# Patient Record
Sex: MALE | Race: BLACK OR AFRICAN AMERICAN | NOT HISPANIC OR LATINO | Employment: UNEMPLOYED | URBAN - METROPOLITAN AREA
[De-identification: names, ages, dates, MRNs, and addresses within clinical notes are randomized per-mention and may not be internally consistent; named-entity substitution may affect disease eponyms.]

---

## 2021-04-16 ENCOUNTER — HOSPITAL ENCOUNTER (OUTPATIENT)
Facility: HOSPITAL | Age: 56
Setting detail: OBSERVATION
Discharge: HOME | End: 2021-04-17
Attending: EMERGENCY MEDICINE | Admitting: INTERNAL MEDICINE
Payer: COMMERCIAL

## 2021-04-16 ENCOUNTER — APPOINTMENT (EMERGENCY)
Dept: RADIOLOGY | Facility: HOSPITAL | Age: 56
End: 2021-04-16
Payer: COMMERCIAL

## 2021-04-16 DIAGNOSIS — R07.9 CHEST PAIN, UNSPECIFIED TYPE: ICD-10-CM

## 2021-04-16 DIAGNOSIS — R07.9 CHEST PAIN: Primary | ICD-10-CM

## 2021-04-16 DIAGNOSIS — I10 HYPERTENSION: ICD-10-CM

## 2021-04-16 DIAGNOSIS — K21.9 GASTROESOPHAGEAL REFLUX DISEASE, UNSPECIFIED WHETHER ESOPHAGITIS PRESENT: ICD-10-CM

## 2021-04-16 PROBLEM — R03.0 ELEVATED BLOOD PRESSURE READING: Status: ACTIVE | Noted: 2021-04-16

## 2021-04-16 LAB
ALBUMIN SERPL BCP-MCNC: 4.4 G/DL (ref 3.4–4.8)
ALP SERPL-CCNC: 61.8 U/L (ref 10–129)
ALT SERPL W P-5'-P-CCNC: 23 U/L (ref 5–63)
ANION GAP SERPL CALCULATED.3IONS-SCNC: 5 MMOL/L (ref 4–13)
APTT PPP: 30 SECONDS (ref 23–31)
AST SERPL W P-5'-P-CCNC: 21 U/L (ref 15–41)
BASOPHILS # BLD AUTO: 0.02 THOUSANDS/ΜL (ref 0–0.1)
BASOPHILS NFR BLD AUTO: 0 % (ref 0–1)
BILIRUB SERPL-MCNC: 0.28 MG/DL (ref 0.3–1.2)
BUN SERPL-MCNC: 23 MG/DL (ref 6–20)
CALCIUM SERPL-MCNC: 9.3 MG/DL (ref 8.4–10.2)
CHLORIDE SERPL-SCNC: 103 MMOL/L (ref 96–108)
CHOLEST SERPL-MCNC: 202 MG/DL
CO2 SERPL-SCNC: 31 MMOL/L (ref 22–33)
CREAT SERPL-MCNC: 1.02 MG/DL (ref 0.5–1.2)
EOSINOPHIL # BLD AUTO: 0.17 THOUSAND/ΜL (ref 0–0.61)
EOSINOPHIL NFR BLD AUTO: 3 % (ref 0–6)
ERYTHROCYTE [DISTWIDTH] IN BLOOD BY AUTOMATED COUNT: 12.5 % (ref 11.6–15.1)
GFR SERPL CREATININE-BSD FRML MDRD: 95 ML/MIN/1.73SQ M
GLUCOSE SERPL-MCNC: 112 MG/DL (ref 65–140)
HCT VFR BLD AUTO: 40.4 % (ref 36.5–49.3)
HDLC SERPL-MCNC: 38 MG/DL
HGB BLD-MCNC: 13.3 G/DL (ref 12–17)
IMM GRANULOCYTES # BLD AUTO: 0.01 THOUSAND/UL (ref 0–0.2)
IMM GRANULOCYTES NFR BLD AUTO: 0 % (ref 0–2)
INR PPP: 0.94 (ref 0.9–1.1)
LDLC SERPL CALC-MCNC: 132 MG/DL (ref 0–100)
LYMPHOCYTES # BLD AUTO: 2.45 THOUSANDS/ΜL (ref 0.6–4.47)
LYMPHOCYTES NFR BLD AUTO: 36 % (ref 14–44)
MCH RBC QN AUTO: 27.3 PG (ref 26.8–34.3)
MCHC RBC AUTO-ENTMCNC: 32.9 G/DL (ref 31.4–37.4)
MCV RBC AUTO: 83 FL (ref 82–98)
MONOCYTES # BLD AUTO: 0.59 THOUSAND/ΜL (ref 0.17–1.22)
MONOCYTES NFR BLD AUTO: 9 % (ref 4–12)
NEUTROPHILS # BLD AUTO: 3.64 THOUSANDS/ΜL (ref 1.85–7.62)
NEUTS SEG NFR BLD AUTO: 52 % (ref 43–75)
PLATELET # BLD AUTO: 178 THOUSANDS/UL (ref 149–390)
PMV BLD AUTO: 9.7 FL (ref 8.9–12.7)
POTASSIUM SERPL-SCNC: 3.8 MMOL/L (ref 3.5–5)
PROT SERPL-MCNC: 7.2 G/DL (ref 6.4–8.3)
PROTHROMBIN TIME: 10.7 SECONDS (ref 9.5–12.1)
RBC # BLD AUTO: 4.87 MILLION/UL (ref 3.88–5.62)
SODIUM SERPL-SCNC: 139 MMOL/L (ref 133–145)
TRIGL SERPL-MCNC: 160.6 MG/DL
TROPONIN I SERPL-MCNC: <0.03 NG/ML (ref 0–0.07)
WBC # BLD AUTO: 6.88 THOUSAND/UL (ref 4.31–10.16)

## 2021-04-16 PROCEDURE — 99220 PR INITIAL OBSERVATION CARE/DAY 70 MINUTES: CPT | Performed by: INTERNAL MEDICINE

## 2021-04-16 PROCEDURE — 85025 COMPLETE CBC W/AUTO DIFF WBC: CPT | Performed by: EMERGENCY MEDICINE

## 2021-04-16 PROCEDURE — 85610 PROTHROMBIN TIME: CPT | Performed by: EMERGENCY MEDICINE

## 2021-04-16 PROCEDURE — 83036 HEMOGLOBIN GLYCOSYLATED A1C: CPT | Performed by: INTERNAL MEDICINE

## 2021-04-16 PROCEDURE — 80053 COMPREHEN METABOLIC PANEL: CPT | Performed by: EMERGENCY MEDICINE

## 2021-04-16 PROCEDURE — 84484 ASSAY OF TROPONIN QUANT: CPT | Performed by: INTERNAL MEDICINE

## 2021-04-16 PROCEDURE — 85730 THROMBOPLASTIN TIME PARTIAL: CPT | Performed by: EMERGENCY MEDICINE

## 2021-04-16 PROCEDURE — 93005 ELECTROCARDIOGRAM TRACING: CPT

## 2021-04-16 PROCEDURE — 99285 EMERGENCY DEPT VISIT HI MDM: CPT | Performed by: EMERGENCY MEDICINE

## 2021-04-16 PROCEDURE — 99285 EMERGENCY DEPT VISIT HI MDM: CPT

## 2021-04-16 PROCEDURE — 80061 LIPID PANEL: CPT | Performed by: INTERNAL MEDICINE

## 2021-04-16 PROCEDURE — 84484 ASSAY OF TROPONIN QUANT: CPT | Performed by: EMERGENCY MEDICINE

## 2021-04-16 PROCEDURE — 71045 X-RAY EXAM CHEST 1 VIEW: CPT

## 2021-04-16 PROCEDURE — 36415 COLL VENOUS BLD VENIPUNCTURE: CPT | Performed by: EMERGENCY MEDICINE

## 2021-04-16 RX ORDER — LOSARTAN POTASSIUM 25 MG/1
25 TABLET ORAL DAILY
Status: DISCONTINUED | OUTPATIENT
Start: 2021-04-16 | End: 2021-04-17

## 2021-04-16 RX ORDER — ASPIRIN 81 MG/1
81 TABLET ORAL ONCE
Status: COMPLETED | OUTPATIENT
Start: 2021-04-16 | End: 2021-04-16

## 2021-04-16 RX ORDER — PANTOPRAZOLE SODIUM 40 MG/1
40 TABLET, DELAYED RELEASE ORAL
Status: DISCONTINUED | OUTPATIENT
Start: 2021-04-16 | End: 2021-04-17 | Stop reason: HOSPADM

## 2021-04-16 RX ORDER — ASPIRIN 81 MG/1
81 TABLET, CHEWABLE ORAL AS NEEDED
COMMUNITY
End: 2021-07-19 | Stop reason: ALTCHOICE

## 2021-04-16 RX ORDER — ASPIRIN 81 MG/1
81 TABLET, CHEWABLE ORAL DAILY
Status: DISCONTINUED | OUTPATIENT
Start: 2021-04-17 | End: 2021-04-17 | Stop reason: HOSPADM

## 2021-04-16 RX ORDER — HYDRALAZINE HYDROCHLORIDE 20 MG/ML
5 INJECTION INTRAMUSCULAR; INTRAVENOUS EVERY 6 HOURS SCHEDULED
Status: DISCONTINUED | OUTPATIENT
Start: 2021-04-16 | End: 2021-04-16

## 2021-04-16 RX ORDER — HYDRALAZINE HYDROCHLORIDE 20 MG/ML
5 INJECTION INTRAMUSCULAR; INTRAVENOUS EVERY 6 HOURS PRN
Status: DISCONTINUED | OUTPATIENT
Start: 2021-04-16 | End: 2021-04-16

## 2021-04-16 RX ORDER — ONDANSETRON 2 MG/ML
4 INJECTION INTRAMUSCULAR; INTRAVENOUS ONCE
Status: DISCONTINUED | OUTPATIENT
Start: 2021-04-16 | End: 2021-04-17 | Stop reason: HOSPADM

## 2021-04-16 RX ADMIN — HYDRALAZINE HYDROCHLORIDE 5 MG: 20 INJECTION INTRAMUSCULAR; INTRAVENOUS at 22:56

## 2021-04-16 RX ADMIN — ASPIRIN 81 MG: 81 TABLET, COATED ORAL at 23:00

## 2021-04-16 RX ADMIN — PANTOPRAZOLE SODIUM 40 MG: 40 TABLET, DELAYED RELEASE ORAL at 23:00

## 2021-04-16 NOTE — ED PROVIDER NOTES
History  Chief Complaint   Patient presents with    Chest Pain     Pt reports on and off left midsternal CP for one week  Pt denies other symptoms  Pain does not worsen with movement  Patient is a 48-year-old male with no documented medical history who presents for chest pain  Patient says he has been having substernal chest discomfort off and on over the past week  He describes it as a pinching like pain    He says it does not radiate anywhere else  He says that is not exertional and will happen randomly, usually at night  He denies associated lightheadedness, dizziness, nausea, vomiting or shortness of breath  He denies any chest pain at this time  The patient is hypertensive here in department  He is not take anything for blood pressure as an outpatient  Rest of his vitals are within normal limits          Prior to Admission Medications   Prescriptions Last Dose Informant Patient Reported? Taking?   aspirin 81 mg chewable tablet  Self Yes Yes   Sig: Chew 81 mg as needed for mild pain      Facility-Administered Medications: None       History reviewed  No pertinent past medical history  History reviewed  No pertinent surgical history  History reviewed  No pertinent family history  I have reviewed and agree with the history as documented  E-Cigarette/Vaping    E-Cigarette Use Never User      E-Cigarette/Vaping Substances     Social History     Tobacco Use    Smoking status: Never Smoker    Smokeless tobacco: Never Used   Substance Use Topics    Alcohol use: Yes     Comment: socially    Drug use: Never       Review of Systems   Constitutional: Negative for chills, fever and unexpected weight change  HENT: Negative for congestion, sore throat and trouble swallowing  Eyes: Negative for pain, discharge and itching  Respiratory: Negative for cough, chest tightness, shortness of breath and wheezing  Cardiovascular: Positive for chest pain   Negative for palpitations and leg swelling  Gastrointestinal: Negative for abdominal pain, blood in stool, diarrhea, nausea and vomiting  Endocrine: Negative for polyuria  Genitourinary: Negative for difficulty urinating, dysuria, frequency and hematuria  Musculoskeletal: Negative for arthralgias and back pain  Skin: Negative for color change and rash  Neurological: Negative for dizziness, syncope, weakness, light-headedness and headaches  Physical Exam  Physical Exam  Vitals signs and nursing note reviewed  Constitutional:       General: He is not in acute distress  Appearance: He is well-developed  HENT:      Head: Normocephalic and atraumatic  Right Ear: External ear normal       Left Ear: External ear normal    Eyes:      Conjunctiva/sclera: Conjunctivae normal       Pupils: Pupils are equal, round, and reactive to light  Neck:      Musculoskeletal: Normal range of motion  Cardiovascular:      Rate and Rhythm: Normal rate and regular rhythm  Heart sounds: Normal heart sounds  No murmur  No friction rub  No gallop  Pulmonary:      Effort: Pulmonary effort is normal  No respiratory distress  Breath sounds: Normal breath sounds  No wheezing or rales  Abdominal:      General: Bowel sounds are normal  There is no distension  Palpations: Abdomen is soft  Tenderness: There is no abdominal tenderness  There is no guarding  Musculoskeletal: Normal range of motion  General: No tenderness or deformity  Lymphadenopathy:      Cervical: No cervical adenopathy  Skin:     General: Skin is warm and dry  Neurological:      General: No focal deficit present  Mental Status: He is alert and oriented to person, place, and time  Mental status is at baseline  Cranial Nerves: No cranial nerve deficit  Sensory: No sensory deficit  Motor: No weakness or abnormal muscle tone     Psychiatric:         Behavior: Behavior normal          Vital Signs  ED Triage Vitals [04/16/21 1929] Temperature Pulse Respirations Blood Pressure SpO2   98 3 °F (36 8 °C) 94 18 (!) 200/112 100 %      Temp Source Heart Rate Source Patient Position - Orthostatic VS BP Location FiO2 (%)   Oral Monitor Sitting Right arm --      Pain Score       5           Vitals:    04/16/21 1929 04/16/21 2033   BP: (!) 200/112 162/91   Pulse: 94 85   Patient Position - Orthostatic VS: Sitting Lying         Visual Acuity      ED Medications  Medications - No data to display    Diagnostic Studies  Results Reviewed     Procedure Component Value Units Date/Time    Troponin I [[id number]]  (Normal) Collected: 04/16/21 1935    Lab Status: Final result Specimen: Blood from Arm, Left Updated: 04/16/21 2001     Troponin I <0 03 ng/mL     Comprehensive metabolic panel [[id number]]  (Abnormal) Collected: 04/16/21 1935    Lab Status: Final result Specimen: Blood from Arm, Left Updated: 04/16/21 1959     Sodium 139 mmol/L      Potassium 3 8 mmol/L      Chloride 103 mmol/L      CO2 31 mmol/L      ANION GAP 5 mmol/L      BUN 23 mg/dL      Creatinine 1 02 mg/dL      Glucose 112 mg/dL      Calcium 9 3 mg/dL      AST 21 U/L      ALT 23 U/L      Alkaline Phosphatase 61 8 U/L      Total Protein 7 2 g/dL      Albumin 4 4 g/dL      Total Bilirubin 0 28 mg/dL      eGFR 95 ml/min/1 73sq m     Narrative:      [facility] guidelines for Chronic Kidney Disease (CKD):     Stage 1 with normal or high GFR (GFR > 90 mL/min/1 73 square meters)    Stage 2 Mild CKD (GFR = 60-89 mL/min/1 73 square meters)    Stage 3A Moderate CKD (GFR = 45-59 mL/min/1 73 square meters)    Stage 3B Moderate CKD (GFR = 30-44 mL/min/1 73 square meters)    Stage 4 Severe CKD (GFR = 15-29 mL/min/1 73 square meters)    Stage 5 End Stage CKD (GFR <15 mL/min/1 73 square meters)  Note: GFR calculation is accurate only with a steady state creatinine    APTT [[id number]]  (Normal) Collected: 04/16/21 1935    Lab Status: Final result Specimen: Blood from Arm, Left Updated: 04/16/21 1958     PTT 30 seconds     Protime-INR [[id number]]  (Normal) Collected: 04/16/21 1935    Lab Status: Final result Specimen: Blood from Arm, Left Updated: 04/16/21 1958     Protime 10 7 seconds      INR 0 94    Narrative:      INR Reference Ranges:  No Anticoagulant, Normal:           0 9-1 1  Standard Dose, Oral Anticoagulant:  2 0-3 0  High Dose, Oral Anticoagulant:      2 5-3 5    CBC and differential [[id number]]  (Normal) Collected: 04/16/21 1935    Lab Status: Final result Specimen: Blood from Arm, Left Updated: 04/16/21 1941     WBC 6 88 Thousand/uL      RBC 4 87 Million/uL      Hemoglobin 13 3 g/dL      Hematocrit 40 4 %      MCV 83 fL      MCH 27 3 pg      MCHC 32 9 g/dL      RDW 12 5 %      MPV 9 7 fL      Platelets 848 Thousands/uL      Neutrophils Relative 52 %      Immat GRANS % 0 %      Lymphocytes Relative 36 %      Monocytes Relative 9 %      Eosinophils Relative 3 %      Basophils Relative 0 %      Neutrophils Absolute 3 64 Thousands/µL      Immature Grans Absolute 0 01 Thousand/uL      Lymphocytes Absolute 2 45 Thousands/µL      Monocytes Absolute 0 59 Thousand/µL      Eosinophils Absolute 0 17 Thousand/µL      Basophils Absolute 0 02 Thousands/µL                  XR chest 1 view portable    (Results Pending)              Procedures  ECG 12 Lead Documentation Only    Date/Time: 4/16/2021 7:53 PM  Performed by: Malcom Metzger DO  Authorized by: Malcom Metzger DO     Indications / Diagnosis:  Chest pain   ECG reviewed by me, the ED Provider: yes    Patient location:  ED  Previous ECG:     Previous ECG:  Unavailable    Comparison to cardiac monitor: Yes    Interpretation:     Interpretation: abnormal    Rate:     ECG rate:  84    ECG rate assessment: normal    Rhythm:     Rhythm: sinus rhythm    Ectopy:     Ectopy: none    QRS:     QRS axis:  Normal  Conduction:     Conduction: normal    ST segments:     ST segments:  Abnormal    Depression:  V2 and V3             ED Course  ED Course as of Apr 16 2114 Fri Apr 16, 2021 1954 Spoke with Dr Julia Norris of Cards who looked at EKG  Agrees that patient is not having STEMI at this time given lack of chest pain  He recommended trending troponins at this time                HEART Risk Score      Most Recent Value   Heart Score Risk Calculator   History  1 Filed at: 04/16/2021 2004   ECG  1 Filed at: 04/16/2021 2004   Age  1 Filed at: 04/16/2021 2004   Risk Factors  1 Filed at: 04/16/2021 2004   Troponin  0 Filed at: 04/16/2021 2004   HEART Score  4 Filed at: 04/16/2021 2004                                    MDM  Number of Diagnoses or Management Options  Chest pain:   Diagnosis management comments: 79-year-old male presenting for substernal chest discomfort off and on for the past week  No associated symptoms  Describes it as a pinching like sensation  Patient initially hypertensive on arrival   Rest of vitals within normal limits  Denies chest pain at this time  Will obtain cardiac workup  EKG shows some mild ST elevation in V2 V3  Spoke with cardiology who believes it is likely secondary to benign early report  Labs within normal limits  Cardiology agreed with observation overnight  Patient admitted to Medicine  Disposition  Final diagnoses:   Chest pain     Time reflects when diagnosis was documented in both MDM as applicable and the Disposition within this note     Time User Action Codes Description Comment    4/16/2021  8:49 PM Ivette Trejo Add [R07 9] Chest pain       ED Disposition     ED Disposition Condition Date/Time Comment    Admit Stable Fri Apr 16, 2021  8:49 PM Case was discussed with GORGE and the patient's admission status was agreed to be Admission Status: observation status to the service of Dr Fina Bermudez   Follow-up Information    None         Patient's Medications   Discharge Prescriptions    No medications on file     No discharge procedures on file      PDMP Review     None          ED Provider  Electronically Signed by           Escobar Leggett DO  04/16/21 9870

## 2021-04-17 VITALS
TEMPERATURE: 97.9 F | HEART RATE: 78 BPM | DIASTOLIC BLOOD PRESSURE: 85 MMHG | WEIGHT: 188.27 LBS | BODY MASS INDEX: 29.55 KG/M2 | SYSTOLIC BLOOD PRESSURE: 143 MMHG | OXYGEN SATURATION: 99 % | HEIGHT: 67 IN | RESPIRATION RATE: 18 BRPM

## 2021-04-17 PROBLEM — I10 HYPERTENSION: Status: ACTIVE | Noted: 2021-04-17

## 2021-04-17 LAB
ATRIAL RATE: 85 BPM
EST. AVERAGE GLUCOSE BLD GHB EST-MCNC: 140 MG/DL
HBA1C MFR BLD: 6.5 %
HCV AB SER QL: NORMAL
P AXIS: 47 DEGREES
PR INTERVAL: 175 MS
QRS AXIS: 32 DEGREES
QRSD INTERVAL: 78 MS
QT INTERVAL: 286 MS
QTC INTERVAL: 338 MS
T WAVE AXIS: 3 DEGREES
TROPONIN I SERPL-MCNC: <0.03 NG/ML (ref 0–0.07)
TROPONIN I SERPL-MCNC: <0.03 NG/ML (ref 0–0.07)
VENTRICULAR RATE: 84 BPM

## 2021-04-17 PROCEDURE — 86803 HEPATITIS C AB TEST: CPT | Performed by: INTERNAL MEDICINE

## 2021-04-17 PROCEDURE — 84484 ASSAY OF TROPONIN QUANT: CPT | Performed by: INTERNAL MEDICINE

## 2021-04-17 PROCEDURE — 93010 ELECTROCARDIOGRAM REPORT: CPT | Performed by: INTERNAL MEDICINE

## 2021-04-17 RX ORDER — ACETAMINOPHEN 325 MG/1
650 TABLET ORAL EVERY 6 HOURS PRN
Status: DISCONTINUED | OUTPATIENT
Start: 2021-04-17 | End: 2021-04-17 | Stop reason: HOSPADM

## 2021-04-17 RX ORDER — LOSARTAN POTASSIUM 50 MG/1
50 TABLET ORAL DAILY
Qty: 30 TABLET | Refills: 0 | Status: SHIPPED | OUTPATIENT
Start: 2021-04-18 | End: 2021-05-27 | Stop reason: SDUPTHER

## 2021-04-17 RX ORDER — ATORVASTATIN CALCIUM 40 MG/1
40 TABLET, FILM COATED ORAL
Qty: 30 TABLET | Refills: 0 | Status: SHIPPED | OUTPATIENT
Start: 2021-04-17 | End: 2021-05-27 | Stop reason: SDUPTHER

## 2021-04-17 RX ORDER — PANTOPRAZOLE SODIUM 40 MG/1
40 TABLET, DELAYED RELEASE ORAL
Qty: 30 TABLET | Refills: 0 | Status: SHIPPED | OUTPATIENT
Start: 2021-04-18 | End: 2021-09-27

## 2021-04-17 RX ORDER — LOSARTAN POTASSIUM 50 MG/1
50 TABLET ORAL DAILY
Status: DISCONTINUED | OUTPATIENT
Start: 2021-04-18 | End: 2021-04-17 | Stop reason: HOSPADM

## 2021-04-17 RX ORDER — ATORVASTATIN CALCIUM 40 MG/1
40 TABLET, FILM COATED ORAL
Status: DISCONTINUED | OUTPATIENT
Start: 2021-04-17 | End: 2021-04-17 | Stop reason: HOSPADM

## 2021-04-17 RX ADMIN — PANTOPRAZOLE SODIUM 40 MG: 40 TABLET, DELAYED RELEASE ORAL at 08:47

## 2021-04-17 RX ADMIN — ACETAMINOPHEN 650 MG: 325 TABLET, FILM COATED ORAL at 04:56

## 2021-04-17 RX ADMIN — ASPIRIN 81 MG: 81 TABLET, CHEWABLE ORAL at 08:47

## 2021-04-17 RX ADMIN — ACETAMINOPHEN 650 MG: 325 TABLET, FILM COATED ORAL at 12:36

## 2021-04-17 RX ADMIN — LOSARTAN POTASSIUM 25 MG: 25 TABLET, FILM COATED ORAL at 04:24

## 2021-04-17 RX ADMIN — ENOXAPARIN SODIUM 40 MG: 40 INJECTION SUBCUTANEOUS at 08:47

## 2021-04-17 NOTE — ASSESSMENT & PLAN NOTE
Chest pain for 8-10 days  Sharp in character, substernal, 6/10, nonradiating, comes and goes, no associated diaphoresis, nausea vomiting, shortness of breath  Not associated with exertion  Patient has history of high blood pressure on occasions, he is not taking any antihypertensive, he does not follow cardiologist or primary care doctor  EKG showed normal sinus rhythm LV hypertrophy  MICHELLE score 1   Troponin 3x negative  Lipid panel showed cholesterol 202, triglyceride 160, HDL 38,   Plan:  Check hemoglobin A1c   Follow results  Follow-up with cardiology outpatient for nuclear stress test and echocardiogram  Continue aspirin 81 mg and atorvastatin 40 mg daily  Continue pantoprazole p o  40 mg daily as chest pain could be related to his GERD as it usually worsens with meals  Patient was instructed to follow cardiac diet  Please schedule stress test within the next 2-3 days  Patient was previously counseled follow-up cardiologist and primary care physician within 2-3 days

## 2021-04-17 NOTE — H&P
Mild[address]conor 30 1965, 63 y o  male MRN: [id number]  Unit/Bed#: -01 Encounter: [id number]  Primary Care Provider: No primary care provider on file  Date and time admitted to hospital: 4/16/2021  7:25 PM    * Chest pain-rule out ACS  Assessment & Plan  70-year-old male, nonsmoker with no significant past medical history presented to emergency department complaining of chest pain  Chest pain for 8-10 days  Sharp in character, substernal, 6/10, nonradiating, comes and goes, no associated diaphoresis, nausea vomiting, shortness of breath  Not associated with exertion  Patient has history of high blood pressure on occasions, he is not taking any antihypertensive, he does not follow cardiologist or primary care doctor  EKG showed normal sinus rhythm , normal axis, normal [place] and QTC, borderline ST depression V2 and V3  MICHELLE score 1   First set of Troponin negative  Trend troponin  Check hemoglobin A1c, lipid panel  Continue aspirin daily  Consult cardiology  Monitor on tele    GERD (gastroesophageal reflux disease)  Assessment & Plan  Patient has symptoms of heartburn, he drinks alcohol on regular basis but has not touched alcohol since 10 days because of symptoms of heartburn  Patient takes baby aspirin daily as he heard from people that aspirin is help full in preventing heart disease and stroke  Patient mentioned that he eats a lot of spicy food and tomatoes  For the symptoms of heartburn ,I will start patient on pantoprazole and see if it can help  Elevated blood pressure reading  Assessment & Plan  In emergency department patient had elevated blood pressure to 200/112, later improved to 162/91  Continue to monitor blood pressure  If blood pressure remains consistently elevated, consider starting antihypertensives      VTE Prophylaxis: Enoxaparin (Lovenox)  / sequential compression device   Code Status: FC    Anticipated Length of Stay:  Patient will be admitted on an Observation basis with an anticipated length of stay of  1 midnights  Justification for Hospital Stay:  Chest pain-rule out ACS    Total Time for Visit, including Counseling / Coordination of Care: 1 hour  Greater than 50% of this total time spent on direct patient counseling and coordination of care  Chief Complaint:   Chest pain    History of Present Illness:    Noah Taylor is a 63 y o  male nonsmoker with no significant past medical history presented to emergency department complaining of chest pain  Patient is experiencing chest pain for past 8-10 days  Pain is sharp in character, substernal, 6/10, comes and goes, no associated diaphoresis, nausea or vomiting, not associated with exertion, nonradiating  Patient denied headache, dizziness, vision changes, palpitations, shortness of breath, nausea, vomiting, abdominal pain, diarrhea, urinary complaints  On arrival to emergency department patient is afebrile, blood pressure 200/112, later improved to 162/91  Heart rate 85, breathing on room air and saturating at 97%  EKG show normal sinus rhythm with a rate of 84, , normal axis, normal [place] and QTC, borderlineST depression in V2 and V3  Lab stable, troponin negative  Review of Systems:    Review of Systems    Past Medical and Surgical History:     History reviewed  No pertinent past medical history  History reviewed  No pertinent surgical history  Meds/Allergies:    Prior to Admission medications    Medication Sig Start Date End Date Taking? Authorizing Provider   aspirin 81 mg chewable tablet Chew 81 mg as needed for mild pain   Yes Historical Provider, MD     I have reviewed home medications with patient personally      Allergies: No Known Allergies    Social History:     Marital Status: /Civil Union     Substance Use History:   Social History     Substance and Sexual Activity   Alcohol Use Yes    Comment: socially     Social History     Tobacco Use   Smoking Status Never Smoker   Smokeless Tobacco Never Used     Social History     Substance and Sexual Activity   Drug Use Never       Family History:    non-contributory    Physical Exam:     Vitals:   Blood Pressure: 162/91 (04/16/21 2033)  Pulse: 85 (04/16/21 2033)  Temperature: 98 3 °F (36 8 °C) (04/16/21 1929)  Temp Source: Oral (04/16/21 1929)  Respirations: 18 (04/16/21 2033)  SpO2: 97 % (04/16/21 2033)    Physical Exam  Constitutional:       Appearance: Normal appearance  HENT:      Head: Normocephalic and atraumatic  Nose: Nose normal       Mouth/Throat:      Mouth: Mucous membranes are moist    Neck:      Musculoskeletal: Normal range of motion and neck supple  Cardiovascular:      Rate and Rhythm: Normal rate and regular rhythm  Pulmonary:      Effort: Pulmonary effort is normal       Breath sounds: Normal breath sounds  Abdominal:      General: Bowel sounds are normal       Palpations: Abdomen is soft  Tenderness: There is no abdominal tenderness  Musculoskeletal: Normal range of motion  Right lower leg: No edema  Left lower leg: No edema  Skin:     General: Skin is warm  Capillary Refill: Capillary refill takes less than 2 seconds  Neurological:      General: No focal deficit present  Mental Status: He is alert and oriented to person, place, and time  Mental status is at baseline  Psychiatric:         Mood and Affect: Mood normal          Behavior: Behavior normal            Additional Data:     Lab Results: I have personally reviewed pertinent reports        Results from last 7 days   Lab Units 04/16/21 1935   WBC Thousand/uL 6 88   HEMOGLOBIN g/dL 13 3   HEMATOCRIT % 40 4   PLATELETS Thousands/uL 178   NEUTROS PCT % 52   LYMPHS PCT % 36   MONOS PCT % 9   EOS PCT % 3     Results from last 7 days   Lab Units 04/16/21 1935   SODIUM mmol/L 139   POTASSIUM mmol/L 3 8   CHLORIDE mmol/L 103   CO2 mmol/L 31   BUN mg/dL 23*   CREATININE mg/dL 1 02   ANION GAP mmol/L 5   CALCIUM mg/dL 9  3   ALBUMIN g/dL 4 4   TOTAL BILIRUBIN mg/dL 0 28*   ALK PHOS U/L 61 8   ALT U/L 23   AST U/L 21   GLUCOSE RANDOM mg/dL 112     Results from last 7 days   Lab Units 04/16/21  1935   INR  0 94                   Imaging: I have personally reviewed pertinent reports  XR chest 1 view portable    (Results Pending)       EKG, Pathology, and Other Studies Reviewed on Admission:   · EKG: Normal sinus rhythm with a rate of 84, , normal axis, normal [place] and QTC, borderlineST depression in V2 and V3    Allscripts / Epic Records Reviewed: Yes     ** Please Note: This note has been constructed using a voice recognition system   **

## 2021-04-17 NOTE — UTILIZATION REVIEW
Initial Clinical Review    Admission: Date/Time/Statement:   Admission Orders (From admission, onward)     Ordered        04/16/21 2049  Place in Observation  Once                   Orders Placed This Encounter   Procedures    Place in Observation     Standing Status:   Standing     Number of Occurrences:   1     Order Specific Question:   Level of Care     Answer:   Med Surg [16]     ED Arrival Information     Expected Arrival Acuity Means of Arrival Escorted By Service Admission Type    - 4/16/2021 19:13 Emergent Walk-In Self General Medicine Emergency    Arrival Complaint    Medical Problem        Chief Complaint   Patient presents with    Chest Pain     Pt reports on and off left midsternal CP for one week  Pt denies other symptoms  Pain does not worsen with movement  Initial Presentation: 63year old male, presented to the ED @ [address] from home via walk in  Admitted as Observation due to Chest pain  (What happened-time started)   (Brief-abnormal physical findings & tests)   (Brief-what was done)   Date:   04/16/2021   Reports pain for the past 8-10 days  Intermittent chest pain  MICHELLE 1  Initial trop WNL, tr3end trops  ECG: NSR,  Monitor on telemetry  Continue ASA  Consult Cardio as outpatient          ED Triage Vitals [04/16/21 1929]   Temperature Pulse Respirations Blood Pressure SpO2   98 3 °F (36 8 °C) 94 18 (!) 200/112 100 %      Temp Source Heart Rate Source Patient Position - Orthostatic VS BP Location FiO2 (%)   Oral Monitor Sitting Right arm --      Pain Score       5          Wt Readings from Last 1 Encounters:   04/17/21 85 4 kg (188 lb 4 4 oz)     Additional Vital Signs:   Date/Time  Temp  Pulse  Resp  BP  MAP (mmHg)  SpO2  O2 Device  Patient Position - Orthostatic VS   04/17/21 0845  --  89  18  156/85  --  --  --  Lying   04/17/21 0713  97 9 °F (36 6 °C)  75  20  163/90  115  97 %  None (Room air)  Lying   04/17/21 0300  --  81  --  167/94  --  98 %  None (Room air)  -- 21  --  --  --  164/82   --  --  --  --   BP: pt refusing losartan at 21  --  98  --  142/119Abnormal   98  --  --  --   21  --  110Abnormal   --  169/104Abnormal   --  --  --  --   21  --  85  --  175/102Abnormal   --  --  --  --   21  98 2 °F (36 8 °C)  70  20  190/109Abnormal    --  99 %  --  --   BP: dr Aria Valdez aware at 21  --  85  18  162/91  --  97 %  None (Room air)  Lying     2021 @ 2304  Chest X:  No acute cardiopulmonary disease  2021 @ 1932  EC, Sinus rhythm    Pertinent Labs/Diagnostic Test Results:     Results from last 7 days   Lab Units 21   WBC Thousand/uL 6 88   HEMOGLOBIN g/dL 13 3   HEMATOCRIT % 40 4   PLATELETS Thousands/uL 178   NEUTROS ABS Thousands/µL 3 64     Results from last 7 days   Lab Units 21   SODIUM mmol/L 139   POTASSIUM mmol/L 3 8   CHLORIDE mmol/L 103   CO2 mmol/L 31   ANION GAP mmol/L 5   BUN mg/dL 23*   CREATININE mg/dL 1 02   EGFR ml/min/1 73sq m 95   CALCIUM mg/dL 9 3     Results from last 7 days   Lab Units 21   AST U/L 21   ALT U/L 23   ALK PHOS U/L 61 8   TOTAL PROTEIN g/dL 7 2   ALBUMIN g/dL 4 4   TOTAL BILIRUBIN mg/dL 0 28*     Results from last 7 days   Lab Units 21   GLUCOSE RANDOM mg/dL 112       Results from last 7 days   Lab Units 21  0419 216 21   TROPONIN I ng/mL <0 03 <0 03 <0 03     Results from last 7 days   Lab Units 21   PROTIME seconds 10 7   INR  0 94   PTT seconds 30     ED Treatment:   Medication Administration from 2021 1913 to 2021     None        History reviewed  No pertinent past medical history      Admitting Diagnosis: Chest pain [R07 9]  Age/Sex: 63 y o  male  Admission Orders:  Scheduled Medications:  aspirin, 81 mg, Oral, Daily  enoxaparin, 40 mg, Subcutaneous, Daily  losartan, 25 mg, Oral, Daily  ondansetron, 4 mg, Intravenous, Once  pantoprazole, 40 mg, Oral, Early Morning      Continuous IV Infusions:     PRN Meds:  acetaminophen, 650 mg, Oral, Q6H PRN      Consult PT  Telemetry  [facility]  IP CONSULT TO CARDIOLOGY    Network Utilization Review Department  ATTENTION: Please call with any questions or concerns to [phone] and carefully listen to the prompts so that you are directed to the right person  All voicemails are confidential   Shahida Tolbert all requests for admission clinical reviews, approved or denied determinations and any other requests to dedicated fax number below belonging to the campus where the patient is receiving treatment   List of dedicated fax numbers for the Facilities:  [address] DENIALS (Administrative/Medical Necessity) [phone]   [address] (Maternity/NICU/Pediatrics) [phone]   [address] [address] [place] [address] [address] [place] [address] [phone]

## 2021-04-17 NOTE — NURSING NOTE
At 2256 pt given hydralazine  After 5 minutes, Pt stated he felt like a wave of dizziness come over him  He thought he was going to become sick  Pt does not like to take medications and is weary of taking more of them  Pt offered zofran  Pt declined  BP still elevated, pt decline losartan at this time  Will recheck BP manually

## 2021-04-17 NOTE — NURSING NOTE
AVS reviewed with pt  Pt verbalizes understanding of all instructions  All belongings accounted for  Pt has no further questions at this time

## 2021-04-17 NOTE — DISCHARGE INSTRUCTIONS
DISCHARGE INSTRUCTIONS  Follow-up with her primary care physician  Follow up with cardiologist  He will need outpatient nuclear stress test and echocardiogram  Adhere to cardiac diet instructions are provided  Please schedule cardiologist appointment and stress test within 2-3 days as this is urgent  Start taking losartan 50 mg oral tablet daily  Continue taking aspirin 81 mg oral daily  Start taking atorvastatin 40 mg daily with dinner  Start taking oral pantoprazole 40 mg daily  Chest Pain, Ambulatory Care   GENERAL INFORMATION:   Chest pain  can be caused by a range of conditions, from not serious to life-threatening  It may be caused by a heart attack or a blood clot in your lungs  Sometimes chest pain or pressure is caused by poor blood flow to your heart (angina)  Infection, inflammation, or a fracture in the bones or cartilage in your chest can cause pain or discomfort  Chest pain can also be a symptom of a digestive problem, such as acid reflux or a stomach ulcer     Common symptoms include the following:   · Fever or sweating     · Nausea or vomiting     · Shortness of breath     · Discomfort or pressure that spreads from your chest to your back, jaw, or arm     · A racing or slow heartbeat     · Feeling weak, tired, or faint  Seek immediate care for the following symptoms:   · Any of the following signs of a heart attack:      ¨ Squeezing, pressure, or pain in your chest that lasts longer than 5 minutes or returns    ¨ Discomfort or pain in your back, neck, jaw, stomach, or arm     ¨ Trouble breathing     ¨ Nausea or vomiting    ¨ Lightheadedness or a sudden cold sweat, especially with trouble breathing         · Chest discomfort that gets worse, even with medicine    · Coughing or vomiting blood    · Black or bloody bowel movements     · Vomiting that does not stop, or pain when you swallow  Treatment for chest pain  may include medicine to treat your symptoms while he determines the cause of your chest pain  You may also need any of the following:  · Antiplatelets , such as aspirin, help prevent blood clots  Take your antiplatelet medicine exactly as directed  These medicines make it more likely for you to bleed or bruise  If you are told to take aspirin, do not take acetaminophen or ibuprofen instead  · Prescription pain medicine  may be given  Ask how to take this medicine safely  Do not smoke: If you smoke, it is never too late to quit  Smoking increases your risk for a heart attack and other heart and lung conditions  Ask your healthcare provider for information about how to stop smoking if you need help  Follow up with your healthcare provider as directed: You may need more tests  You may be referred to a specialist, such as a cardiologist or gastroenterologist  Write down your questions so you remember to ask them during your visits  CARE AGREEMENT:   You have the right to help plan your care  Learn about your health condition and how it may be treated  Discuss treatment options with your caregivers to decide what care you want to receive  You always have the right to refuse treatment  The above information is an  only  It is not intended as medical advice for individual conditions or treatments  Talk to your doctor, nurse or pharmacist before following any medical regimen to see if it is safe and effective for you  © 2014 [address] is for End User's use only and may not be sold, redistributed or otherwise used for commercial purposes  All illustrations and images included in CareNotes® are the copyrighted property of [employer]  or [employer]  Low-Sodium Diet   WHAT YOU NEED TO KNOW:   A low-sodium diet limits foods that are high in sodium (salt)  You will need to follow a low-sodium diet if you have high blood pressure, kidney disease, or heart failure   You may also need to follow this diet if you have a condition that is causing your body to retain (hold) extra fluid  You may need to limit the amount of sodium you eat in a day to 1,500 to 2,000 mg  Ask your healthcare provider how much sodium you can have each day  DISCHARGE INSTRUCTIONS:   How to use food labels to choose foods that are low in sodium: Read food labels to find the amount of sodium they contain  The amount of sodium is listed in milligrams (mg)  The % Daily Value (DV) column tells you how much of your daily needs are met by 1 serving of the food for each nutrient listed  Choose foods that have less than 5% of the DV of sodium  These foods are considered low in sodium  Foods that have 20% or more of the DV of sodium are considered high in sodium  Some food labels may also list any of the following terms that tell you about the sodium content in the food:  · Sodium-free: Less than 5 mg in each serving     · Very low sodium: 35 mg of sodium or less in each serving     · Low sodium: 140 mg of sodium or less in each serving     · Reduced sodium: At least 25% less sodium in each serving than the regular type     · Light in sodium: 50% less sodium in each serving     · Unsalted or no added salt: No extra salt is added during processing (the food may still contain sodium)     Foods to avoid: Salty foods are high in sodium  You should avoid the following:  · Processed foods:      ? Mixes for cornbread, biscuits, cake, and pudding      ?  Instant foods, such as potatoes, cereals, noodles, and rice      ? Packaged foods, such as bread stuffing, rice and pasta mixes, snack dip mixes, and macaroni and cheese      ? Canned foods, such as canned vegetables, soups, broths, sauces, and vegetable or tomato juice     ? Snack foods, such as salted chips, popcorn, pretzels, pork rinds, salted crackers, and salted nuts     ? Frozen foods, such as dinners, entrees, vegetables with sauces, and breaded meats     ? Sauerkraut, pickled vegetables, and other foods prepared in brine     · Meats and cheeses:      ? Smoked or cured meat, such as corned beef, luong, ham, hot dogs, and sausage     ? Canned meats or spreads, such as potted meats, sardines, anchovies, and imitation seafood     ? Deli or lunch meats, such as bologna, ham, turkey, and roast beef     ? Processed cheese, such as American cheese and cheese spreads     · Condiments, sauces, and seasonings:      ? Salt (¼ teaspoon of salt contains 575 mg of sodium)     ? Seasonings made with salt, such as garlic salt, celery salt, onion salt, and seasoned salt     ? Regular soy sauce, barbecue sauce, teriyaki sauce, steak sauce, Worcestershire sauce, and most flavored vinegars     ? Canned gravy and mixes      ? Regular condiments, such as mustard, ketchup, and salad dressings     ? Pickles and olives     ? Meat tenderizers and monosodium glutamate (MSG)     Foods to include: Read the food label to find the amount of sodium in each serving  · Bread and cereal: Try to choose breads with less than 80 mg of sodium per serving       ? Bread, roll, madi, tortilla, or unsalted crackers      ? Ready-to-eat cereals with less than 5% DV of sodium (examples include shredded wheat and puffed rice)     ? Pasta     · Vegetables and fruits:      ? Unsalted fresh, frozen, or canned vegetables     ? Fresh, frozen, or canned fruits     ? Fruit juice     · Dairy: One serving has about 150 mg of sodium      ? Milk, all types     ? Yogurt     ? Hard cheese, such as cheddar, Swiss, [employer], or mozzarella     · Meat and other protein foods: Some raw meats may have added sodium       ? Plain meats, fish, and poultry      ? Egg     · Other foods:      ? Homemade pudding     ? Unsalted nuts, popcorn, or pretzels     ? Unsalted butter or margarine     Ways to decrease sodium:   · Add spices and herbs to foods instead of salt during cooking  Use salt-free seasonings to add flavor to foods   Examples include onion powder, garlic powder, basil, bender powder, paprika, and parsley  Try lemon or lime juice or vinegar to give foods a tart flavor  Use hot peppers, pepper, or cayenne pepper to add a spicy flavor      · Do not keep a salt shaker at your kitchen table  This may help keep you from adding salt to food at the table  A teaspoon of salt has 2,300 mg of sodium  It may take time to get used to enjoying the natural flavor of food instead of adding salt  Talk to your healthcare provider before you use salt substitutes  Some salt substitutes have a high amount of potassium and need to be avoided if you have kidney disease      · Choose low-sodium foods at restaurants  Meals from restaurants are often high in sodium  Some restaurants have nutrition information on the menu that tells you the amount of sodium in their foods  If possible, ask for your food to be prepared with less, or no salt      · Shop for unsalted or low-sodium foods and snacks at the grocery store  Examples include unsalted or low-sodium broths, soups, and canned vegetables  Choose fresh or frozen vegetables instead  Choose unsalted nuts or seeds or fresh fruits or vegetables as snacks  Read food labels and choose salt-free, very low-sodium, or low-sodium foods      © Copyright [employer] 2020 Information is for End User's use only and may not be sold, redistributed or otherwise used for commercial purposes  All illustrations and images included in CareNotes® are the copyrighted property of [employer]  or Katie Davis   The above information is an  only  It is not intended as medical advice for individual conditions or treatments   Talk to your doctor, nurse or pharmacist before following any medical regimen to see if it is safe and effective for you

## 2021-04-17 NOTE — ASSESSMENT & PLAN NOTE
23-year-old male, nonsmoker with no significant past medical history presented to emergency department complaining of chest pain  Chest pain for 8-10 days  Sharp in character, substernal, 6/10, nonradiating, comes and goes, no associated diaphoresis, nausea vomiting, shortness of breath  Not associated with exertion  Patient has history of high blood pressure on occasions, he is not taking any antihypertensive, he does not follow cardiologist or primary care doctor    EKG showed normal sinus rhythm , normal axis, normal DC and QTC, borderline ST depression V2 and V3  MICHELLE score 1   First set of Troponin negative  Trend troponin  Check hemoglobin A1c, lipid panel  Continue aspirin daily  Consult cardiology  Monitor on tele

## 2021-04-17 NOTE — ASSESSMENT & PLAN NOTE
Patient has symptoms of heartburn  Drinks alcohol on regular basis but has not touched alcohol for 1 month because of symptoms of heartburn  Patient takes baby aspirin daily as he heard from people that aspirin is help full in preventing heart disease and stroke  Patient mentioned that he eats a lot of spicy food and tomatoes  Continue pantoprazole 40 mg p o   Daily and follow-up with primary care physician

## 2021-04-17 NOTE — DISCHARGE SUMMARY
Barbra U  65   Discharge- Flory Mace 1965, 63 y o  male MRN: [id number]  Unit/Bed#: -01 Encounter: [id number]  Primary Care Provider: No primary care provider on file  Date and time admitted to hospital: 4/16/2021  7:25 PM    * Chest pain-rule out ACS  Assessment & Plan  Chest pain for 8-10 days  Sharp in character, substernal, 6/10, nonradiating, comes and goes, no associated diaphoresis, nausea vomiting, shortness of breath  Not associated with exertion  Patient has history of high blood pressure on occasions, he is not taking any antihypertensive, he does not follow cardiologist or primary care doctor  EKG showed normal sinus rhythm LV hypertrophy  MICHELLE score 1   Troponin 3x negative  Lipid panel showed cholesterol 202, triglyceride 160, HDL 38,   Plan:  Check hemoglobin A1c  Follow results  Follow-up with cardiology outpatient for nuclear stress test and echocardiogram  Continue aspirin 81 mg and atorvastatin 40 mg daily  Continue pantoprazole p o  40 mg daily as chest pain could be related to his GERD as it usually worsens with meals  Patient was instructed to follow cardiac diet  Please schedule stress test within the next 2-3 days  Patient was previously counseled follow-up cardiologist and primary care physician within 2-3 days    Hypertension  Assessment & Plan  Patient has been noncompliant with medications in the past  Started on losartan 25 mg, but blood pressure was still uncontrolled  Patient is reluctant initially to take medications for his hypertension but after counseling and explaining the risks and benefits he concedes  Continue losartan 50 mg daily upon discharge  Continue cardiac diet  Follow-up with cardiology      GERD (gastroesophageal reflux disease)  Assessment & Plan  Patient has symptoms of heartburn  Drinks alcohol on regular basis but has not touched alcohol for 1 month because of symptoms of heartburn      Patient takes baby aspirin daily as he heard from people that aspirin is help full in preventing heart disease and stroke  Patient mentioned that he eats a lot of spicy food and tomatoes  Continue pantoprazole 40 mg p o  Daily and follow-up with primary care physician          Discharging Physician / Practitioner: Doc Wilkins MD  PCP: No primary care provider on file  Admission Date:   Admission Orders (From admission, onward)     Ordered        04/16/21 2049  Place in Observation  Once                   Discharge Date: 04/17/21    Resolved Problems  Date Reviewed: 4/17/2021    None          Consultations During Hospital Stay:  · Cardiology     Procedures Performed:   · None    Significant Findings / Test Results:   · None    Incidental Findings:   · None     Test Results Pending at Discharge (will require follow up): · [id number]     Outpatient Tests Requested:  · Nuclear stress test and echocardiogram    Complications:  None     Reason for Admission:  Chest pain rule out ACS    Hospital Course:     Noelle Jasmine is a 63 y o  male patient who originally presented to the hospital on 4/16/2021 due to chest pain  Patient was experiencing chest pain for past 1 week  Pain is sharp in character, substernal, crushing, 6/10, comes and goes, no associated diaphoresis, nausea or vomiting, not associated with exertion, nonradiating  Patient states that is worse after meals and usually occurs in the evening after dinner  On arrival to the ED patient's blood pressure is 200/112, later improved to systolic blood pressure in the 160s  He was saturating well on room air  EKG shows normal sinus rhythm with left ventricular hypertrophy  Troponin was negative 3 times  Patient denied headache, dizziness, vision changes, palpitations, shortness of breath, nausea, vomiting, abdominal pain, diarrhea, urinary complaints    Cardiology was consulted and they recommended outpatient follow-up with nuclear stress test and echocardiogram   Blood pressure was still elevated and patient was started on losartan 50 mg daily  He was started on atorvastatin 40 mg daily  Aspirin was continued  P o  Pantoprazole 40 mg was started  Patient was told to follow-up with his cardiologist within the next 2-3 days and follow-up with his primary care physician  Hemoglobin A1c lab is still pending upon discharge and should be followed by his primary care physician  Please see above list of diagnoses and related plan for additional information  Condition at Discharge: stable     Discharge Day Visit / Exam:     Subjective:  Patient states that his chest pain has resolved  He was having some abdominal pain earlier in the morning but that has since resolved  Patient states he is feeling much better  Patient was initially reluctant to take antihypertensives and refuse medications the previous night  After being counseled patient agrees to take blood pressure medications in the morning  Vitals: Blood Pressure: 143/85 (04/17/21 1146)  Pulse: 78 (04/17/21 1146)  Temperature: 97 9 °F (36 6 °C) (04/17/21 1146)  Temp Source: Tympanic (04/17/21 1146)  Respirations: 18 (04/17/21 1146)  Height: 5' 7" (170 2 cm) (04/16/21 2200)  Weight - Scale: 85 4 kg (188 lb 4 4 oz) (04/17/21 0536)  SpO2: 99 % (04/17/21 1146)  Exam:   Physical Exam  Vitals signs reviewed  HENT:      Head: Normocephalic and atraumatic  Mouth/Throat:      Mouth: Mucous membranes are moist    Eyes:      Extraocular Movements: Extraocular movements intact  Pupils: Pupils are equal, round, and reactive to light  Neck:      Musculoskeletal: Normal range of motion  Cardiovascular:      Rate and Rhythm: Normal rate and regular rhythm  Pulses: Normal pulses  Heart sounds: Normal heart sounds  Pulmonary:      Effort: Pulmonary effort is normal  No respiratory distress  Breath sounds: Normal breath sounds  No stridor  No wheezing, rhonchi or rales  Chest:      Chest wall: No tenderness  Abdominal:      General: Abdomen is flat  Bowel sounds are normal    Musculoskeletal: Normal range of motion  Skin:     General: Skin is warm and dry  Neurological:      Mental Status: He is alert and oriented to person, place, and time  Mental status is at baseline  Psychiatric:         Mood and Affect: Mood normal              Discharge instructions/Information to patient and family:   See after visit summary for information provided to patient and family  Provisions for Follow-Up Care:  See after visit summary for information related to follow-up care and any pertinent home health orders  Disposition:     Home    For Discharges to [facility]:   · Not Applicable to this Patient - Not Applicable to this Patient    Planned Readmission: None     Discharge Statement:  I spent 60 minutes discharging the patient  This time was spent on the day of discharge  I had direct contact with the patient on the day of discharge  Greater than 50% of the total time was spent examining patient, answering all patient questions, arranging and discussing plan of care with patient as well as directly providing post-discharge instructions  Additional time then spent on discharge activities  Discharge Medications:  See after visit summary for reconciled discharge medications provided to patient and family        ** Please Note: This note has been constructed using a voice recognition system **

## 2021-04-17 NOTE — ASSESSMENT & PLAN NOTE
In emergency department patient had elevated blood pressure to 200/112, later improved to 162/91  Continue to monitor blood pressure  If blood pressure remains consistently elevated, consider starting antihypertensives

## 2021-04-17 NOTE — ASSESSMENT & PLAN NOTE
Patient has been noncompliant with medications in the past  Started on losartan 25 mg, but blood pressure was still uncontrolled  Patient is reluctant initially to take medications for his hypertension but after counseling and explaining the risks and benefits he concedes  Continue losartan 50 mg daily upon discharge  Continue cardiac diet  Follow-up with cardiology

## 2021-04-17 NOTE — ASSESSMENT & PLAN NOTE
Patient has symptoms of heartburn, he drinks alcohol on regular basis but has not touched alcohol since 10 days because of symptoms of heartburn  Patient takes baby aspirin daily as he heard from people that aspirin is help full in preventing heart disease and stroke  Patient mentioned that he eats a lot of spicy food and tomatoes  For the symptoms of heartburn ,I will start patient on pantoprazole and see if it can help

## 2021-04-17 NOTE — QUICK NOTE
I was notified by the nurse that after giving hydralazine for elevated blood pressure patient felt dizzy and nauseous  Event in patient's room to evaluate the patient  Patient was alert awake oriented x3, denied pain in his chest, feeling dizzy and nauseous  Zofran ordered  Will continue to follow-up

## 2021-05-14 ENCOUNTER — CONSULT (OUTPATIENT)
Dept: CARDIOLOGY CLINIC | Facility: CLINIC | Age: 56
End: 2021-05-14
Payer: COMMERCIAL

## 2021-05-14 VITALS
SYSTOLIC BLOOD PRESSURE: 168 MMHG | WEIGHT: 189 LBS | OXYGEN SATURATION: 98 % | HEART RATE: 101 BPM | HEIGHT: 67 IN | DIASTOLIC BLOOD PRESSURE: 102 MMHG | BODY MASS INDEX: 29.66 KG/M2

## 2021-05-14 DIAGNOSIS — R07.9 CHEST PAIN, UNSPECIFIED TYPE: ICD-10-CM

## 2021-05-14 DIAGNOSIS — E78.00 HYPERCHOLESTEROLEMIA: ICD-10-CM

## 2021-05-14 DIAGNOSIS — I10 ESSENTIAL HYPERTENSION: Primary | ICD-10-CM

## 2021-05-14 PROCEDURE — 99204 OFFICE O/P NEW MOD 45 MIN: CPT | Performed by: INTERNAL MEDICINE

## 2021-05-14 PROCEDURE — 93000 ELECTROCARDIOGRAM COMPLETE: CPT | Performed by: INTERNAL MEDICINE

## 2021-05-14 RX ORDER — HYDROCHLOROTHIAZIDE 25 MG/1
25 TABLET ORAL DAILY
Qty: 90 TABLET | Refills: 3 | Status: SHIPPED | OUTPATIENT
Start: 2021-05-14 | End: 2021-07-19 | Stop reason: ALTCHOICE

## 2021-05-14 NOTE — PROGRESS NOTES
[facility] Cardiology  Office Consultation  Catarino Guerra 61 y o  male MRN: [id number]        Problems    1  Essential hypertension     2   Chest pain, unspecified type  Ambulatory referral to Cardiology    POCT ECG       Impression:       Chest pain  o Atypical  o EKG more suggestive of LVH with repolarization change  o Troponin normal during hospital visit in April  o Suspect this might be due to his hypertension but he has intermediate cardiovascular risk and therefore ischemic testing is indicated   Hypercholesterolemia  The 10-year ASCVD risk score (Manjinder Lees et al , 2013) is: 15 7%    Values used to calculate the score:      Age: 61 years      Sex: Male      Is Non- : Yes      Diabetic: No      Tobacco smoker: No      Systolic Blood Pressure: 687 mmHg      Is BP treated: Yes      HDL Cholesterol: 38 mg/dL  o     Total Cholesterol: 202 mg/dL  o He has been placed on atorvastatin, he will need eventual lipid reassessment   Hypertension  o Severe range hypertension, 164/102 today  o On losartan 50 mg daily and compliant  o Diet likely high in sodium  o EKG notable for LVH  o Exam notable for JVD suggesting some element of volume overload    Plan     Importance of sodium restriction, exercise, weight loss was discussed and recommendations provided   Add HCTZ 25 mg daily   BMP in 2 weeks   Continue losartan   Continue atorvastatin   Continue aspirin 81 mg daily   Echocardiogram as soon as possible   Stress Myoview recommended, should not be scheduled with the echo if this delays the echo, it would be more beneficial to have S stress test delayed to optimize his blood pressure control prior to going for it   3 week follow-up with nurse practitioner for blood pressure control assessment   If blood pressure still greater than 140/90, add amlodipine   Blood pressure not much changed on losartan, if additive drugs are more effective, may consider discontinuation of losartan in the future      Reason for Consult / Principal Problem:  Chest pain and high blood    HPI: Catarino Guerra is a 61y o  year old male from [place], , 2 sons, 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, has probably had blood pressure longstanding, but recently developed chest pressure in the setting of severe blood pressure elevation for which he was hospitalized overnight for 24 hours in April, sent home on losartan 50 mg daily, atorvastatin 40 mg daily, aspirin 81 mg daily  Chest pain has improved, EKG does not show any severe ischemic changes, but rather LVH with repolarization abnormality  He has tried to make some dietary adjustments, but he is overweight, and has had some modest alcohol intake which she has not really changed  He also does not get any significant physical exercise  He has intermediate cardiovascular risk by ASCVD score, LDL was about 130, but he does not have any family history of CAD or premature CAD  Review of Systems   Constitutional: Negative  HENT: Negative  Eyes: Negative  Respiratory: Negative  Cardiovascular: Negative  Gastrointestinal: Negative  Endocrine: Negative  Genitourinary: Negative  Musculoskeletal: Negative  Skin: Negative  Neurological: Negative  Hematological: Negative  Psychiatric/Behavioral: Negative  Past Medical History:   Diagnosis Date    Chest pain     Diverticulitis     GERD (gastroesophageal reflux disease)     HTN (hypertension)     Pain in left wrist      No past surgical history on file  Social History     Substance and Sexual Activity   Alcohol Use Yes    Comment: socially     Social History     Substance and Sexual Activity   Drug Use Never     Social History     Tobacco Use   Smoking Status Never Smoker   Smokeless Tobacco Never Used     No family history on file      Allergies:  No Known Allergies    Medications:     Current Outpatient Medications:     aspirin 81 mg chewable tablet, Chew 81 mg as needed for mild pain, Disp: , Rfl:     atorvastatin (LIPITOR) 40 mg tablet, Take 1 tablet (40 mg total) by mouth daily with dinner, Disp: 30 tablet, Rfl: 0    losartan (COZAAR) 50 mg tablet, Take 1 tablet (50 mg total) by mouth daily, Disp: 30 tablet, Rfl: 0    pantoprazole (PROTONIX) 40 mg tablet, Take 1 tablet (40 mg total) by mouth daily in the early morning, Disp: 30 tablet, Rfl: 0      There were no vitals filed for this visit  Weight (last 2 days)     None        Physical Exam  Constitutional:       General: He is not in acute distress  Appearance: Normal appearance  He is well-developed  He is not diaphoretic  HENT:      Head: Normocephalic and atraumatic  Eyes:      General: No scleral icterus  Conjunctiva/sclera: Conjunctivae normal       Pupils: Pupils are equal, round, and reactive to light  Neck:      Musculoskeletal: Normal range of motion and neck supple  Thyroid: No thyromegaly  Vascular: JVD present  Cardiovascular:      Rate and Rhythm: Normal rate and regular rhythm  Heart sounds: Normal heart sounds  No murmur  No friction rub  No gallop  Pulmonary:      Effort: Pulmonary effort is normal  No respiratory distress  Breath sounds: Normal breath sounds  No wheezing or rales  Abdominal:      General: Bowel sounds are normal  There is no distension  Palpations: Abdomen is soft  Tenderness: There is no abdominal tenderness  Musculoskeletal: Normal range of motion  General: No deformity  Right lower leg: No edema  Left lower leg: No edema  Skin:     General: Skin is warm and dry  Findings: No erythema or rash  Neurological:      General: No focal deficit present  Mental Status: He is alert and oriented to person, place, and time  Cranial Nerves: No cranial nerve deficit  Motor: No weakness             Laboratory Studies:    Laboratory studies personally reviewed    Cardiac testing:     EKG reviewed personally:   Results for orders placed or performed in visit on 05/14/21   POCT ECG    Impression    Sinus rhythm  LVH with repolarization abnormality   '      Echocardiogram:      Stress test:      Holter:      Cardiac catheterization:        Saurabh Ramirez MD    Portions of the record may have been created with voice recognition software  Occasional wrong word or "sound a like" substitutions may have occurred due to the inherent limitations of voice recognition software  Read the chart carefully and recognize, using context, where substitutions have occurred

## 2021-05-14 NOTE — PATIENT INSTRUCTIONS
Hypertension recommendations    Limit alcohol to less than 1 per day on average  Limit sodium to less than 2000 mg per day, or focus on less than 150 mg per serving on the nutrition label  Aerobic exercise 30 min 3-5 days a week  Weight loss of 5-10% is recommended  Check blood pressure once to twice a week if able  Goal BP over next few weeks is 140-150/80-90  Eventual longterm BP goal is <135/85

## 2021-05-22 NOTE — PROGRESS NOTES
Cardiology  Hospital Follow Up   Office Visit Note  Dayami Gutierrez   61 y o    male   MRN: [id number]  [address]  [address] [place] [id number] [phone]    PCP: No primary care provider on file  Cardiologist: Dr Jakob Carter            Summary of recommendations  Heart healthy diet  Educational information provided  BMP, troponin x 1 now  Await the echocardiogram and stress Myoview findings  Follow up will be scheduled with me after testing ( after 7/9),Dr Jakob Caretr  In a few months      Assessment/plan  Chest pain, atypical  Check BMP, stat troponin  Hypertension, essential   BP  156/78 on losartan 50 mg daily, HCTZ 25 mg daily  HCTZ recently added  Addition of amlodipine was recommended if blood pressure was still uncontrolled,ie> 140/90  Hyperlipidemia, on atorvastatin 40 mg daily  4/16/21: , total cholesterol 202, triglyceride 160, HDL 38  Cardiac testing   TTE  : Pending            HPI  Dayami Gutierrez I s a 60 yo male with hypertension  Recently evaluated for atypical chest pain, by Dr Jakob Carter, following hospital admission for chest pain where he had negative troponins and was treated for accelerated hypertension and HL  [place] His EKG was felt to be suggestive of LVH with repolarization change  Suspect this may be related to hypertension  However, given his intermediate cardiovascular risk factors, a stress test and echocardiogram were ordered to further evaluate  He was recently placed on atorvastatin and recommended an eventual reassessment  HCTZ was added to optimize his blood pressure  He was encouraged to adhere to a heart healthy, low-sodium diet  Recommend follow-up in a couple of weeks primarily for reassessment of his blood pressure  5/27/21  Close follow-up, primarily for hypertension  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, was very hot ;he does laborous work    His EKG shows sinus tachycardia 105 beats per minute, LVH with repolarization changes, ST segment coving, in leads 1, aVL, nonspecific changes inferiorly and laterally  He had some pinching type chest pain earlier today, none at time of the visit  He does have some difficulty communicating, as he is from [place]  His blood pressure is elevated 156/78  He tells me he had plenty of medicine that Dr Anuj Lang gave him at the last visit however the pills that were [facility] to him at [facility] he ran out of, about 10 days ago  These included losartan 5o mg and atorvastatin 40 mg  He had no intolerance has felt good, and would like a refill  Today, he is amenable to blood work  Will get a BMP, stat troponin as his EKG showed coving of the ST segments in lead 1 aVL         I have spent 25 minutes with Patient  today in which greater than 50% of this time was spent in counseling/coordination of care regarding Intructions for management, Patient and family education, Importance of tx compliance and Risk factor reductions  Assessment  Diagnoses and all orders for this visit:    Essential hypertension  -     POCT ECG  -     Basic metabolic panel; Future    Hypercholesterolemia    Hypertension  -     losartan (COZAAR) 50 mg tablet; Take 1 tablet (50 mg total) by mouth daily    Chest pain, unspecified type  -     atorvastatin (LIPITOR) 40 mg tablet; Take 1 tablet (40 mg total) by mouth daily with dinner  -     Troponin I          Past Medical History:   Diagnosis Date    Chest pain     Diverticulitis     GERD (gastroesophageal reflux disease)     HTN (hypertension)     Pain in left wrist        Review of Systems   Constitution: Negative for chills  Cardiovascular: Negative for chest pain, claudication, cyanosis, dyspnea on exertion, irregular heartbeat, leg swelling, near-syncope, orthopnea, palpitations, paroxysmal nocturnal dyspnea and syncope  Pinching type chest pain   Respiratory: Negative for cough and shortness of breath      Gastrointestinal: Negative for heartburn and nausea  Neurological: Negative for dizziness, focal weakness, headaches, light-headedness and weakness  All other systems reviewed and are negative  No Known Allergies        Current Outpatient Medications:     aspirin 81 mg chewable tablet, Chew 81 mg as needed for mild pain, Disp: , Rfl:     hydrochlorothiazide (HYDRODIURIL) 25 mg tablet, Take 1 tablet (25 mg total) by mouth daily, Disp: 90 tablet, Rfl: 3    pantoprazole (PROTONIX) 40 mg tablet, Take 1 tablet (40 mg total) by mouth daily in the early morning, Disp: 30 tablet, Rfl: 0    atorvastatin (LIPITOR) 40 mg tablet, Take 1 tablet (40 mg total) by mouth daily with dinner, Disp: 30 tablet, Rfl: 3    losartan (COZAAR) 50 mg tablet, Take 1 tablet (50 mg total) by mouth daily, Disp: 30 tablet, Rfl: 3        Social History     Socioeconomic History    Marital status: /Civil Union     Spouse name: Not on file    Number of children: Not on file    Years of education: Not on file    Highest education level: Not on file   Occupational History    Not on file   Social Needs    Financial resource strain: Not on file    Food insecurity     Worry: Not on file     Inability: Not on file   [employer] needs     Medical: Not on file     Non-medical: Not on file   Tobacco Use    Smoking status: Never Smoker    Smokeless tobacco: Never Used   Substance and Sexual Activity    Alcohol use: Yes     Comment: socially    Drug use: Never    Sexual activity: Yes   Lifestyle    Physical activity     Days per week: Not on file     Minutes per session: Not on file    Stress: Not on file   Relationships    Social connections     Talks on phone: Not on file     Gets together: Not on file     Attends [employer] service: Not on file     Active member of club or organization: Not on file     Attends meetings of clubs or organizations: Not on file     Relationship status: Not on file    Intimate partner violence     Fear of current or ex partner: Not on file     Emotionally abused: Not on file     Physically abused: Not on file     Forced sexual activity: Not on file   Other Topics Concern    Not on file   Social History Narrative    Not on file       Family History   Problem Relation Age of Onset    No Known Problems Mother     No Known Problems Father     No Known Problems Brother        Physical Exam   Constitutional: He is oriented to person, place, and time  No distress  HENT:   Head: Normocephalic and atraumatic  Eyes: Conjunctivae and EOM are normal    Neck: Normal range of motion  Neck supple  Cardiovascular: Normal rate, regular rhythm, normal heart sounds and intact distal pulses  Pulmonary/Chest: Effort normal and breath sounds normal    Abdominal: Soft  Bowel sounds are normal    Musculoskeletal: Normal range of motion  Neurological: He is alert and oriented to person, place, and time  Skin: Skin is warm and dry  Psychiatric: He has a normal mood and affect  Nursing note and vitals reviewed  Vitals: Blood pressure 156/78, pulse 105, height 5' 7" (1 702 m), weight 84 4 kg (186 lb), SpO2 97 %  Wt Readings from Last 3 Encounters:   05/27/21 84 4 kg (186 lb)   05/14/21 85 7 kg (189 lb)   04/17/21 85 4 kg (188 lb 4 4 oz)         Labs & Results:  Lab Results   Component Value Date    WBC 6 88 04/16/2021    HGB 13 3 04/16/2021    HCT 40 4 04/16/2021    MCV 83 04/16/2021     04/16/2021     No results found for: BNP  No components found for: CHEM  Troponin I   Date Value Ref Range Status   04/17/2021 <0 03 0 00 - 0 07 ng/mL Final     Comment:     [employer]'s Chemistry analyzer 99% cutoff is > 0 03ng/mL    o cTnl 99% cutoff is useful only when applied to patients in the clinical setting of myocardial ischemia  o cTnl 99% cutoff should be interpreted in the context of clinical history, ECG findings and possibly cardiac imaging to establish correct diagnosis    o cTnl 99% cutoff may be suggestive but clearly not indicative of a coronary event without the clinical setting of myocardial ischemia    04/16/2021 <0 03 0 00 - 0 07 ng/mL Final     Comment:     [employer] Chemistry analyzer 99% cutoff is > 0 03ng/mL    o cTnl 99% cutoff is useful only when applied to patients in the clinical setting of myocardial ischemia  o cTnl 99% cutoff should be interpreted in the context of clinical history, ECG findings and possibly cardiac imaging to establish correct diagnosis  o cTnl 99% cutoff may be suggestive but clearly not indicative of a coronary event without the clinical setting of myocardial ischemia    04/16/2021 <0 03 0 00 - 0 07 ng/mL Final     Comment:     [employer] Chemistry analyzer 99% cutoff is > 0 03ng/mL    o cTnl 99% cutoff is useful only when applied to patients in the clinical setting of myocardial ischemia  o cTnl 99% cutoff should be interpreted in the context of clinical history, ECG findings and possibly cardiac imaging to establish correct diagnosis  o cTnl 99% cutoff may be suggestive but clearly not indicative of a coronary event without the clinical setting of myocardial ischemia  No results found for this or any previous visit  No results found for this or any previous visit  This note was completed in part utilizing [employer] direct voice recognition software  Grammatical errors, random word insertion, spelling mistakes, and incomplete sentences may be an occasional consequence of the system secondary to software limitations, ambient noise and hardware issues  At the time of dictation, efforts were made to edit, clarify and /or correct errors  Please read the chart carefully and recognize, using context, where substitutions have occurred    If you have any questions or concerns about the context, text or information contained within the body of this dictation, please contact myself, the provider, for further clarification

## 2021-05-27 ENCOUNTER — OFFICE VISIT (OUTPATIENT)
Dept: CARDIOLOGY CLINIC | Facility: CLINIC | Age: 56
End: 2021-05-27
Payer: COMMERCIAL

## 2021-05-27 ENCOUNTER — APPOINTMENT (OUTPATIENT)
Dept: LAB | Facility: CLINIC | Age: 56
End: 2021-05-27
Payer: COMMERCIAL

## 2021-05-27 VITALS
OXYGEN SATURATION: 97 % | HEIGHT: 67 IN | HEART RATE: 105 BPM | WEIGHT: 186 LBS | DIASTOLIC BLOOD PRESSURE: 78 MMHG | BODY MASS INDEX: 29.19 KG/M2 | SYSTOLIC BLOOD PRESSURE: 156 MMHG

## 2021-05-27 DIAGNOSIS — I10 ESSENTIAL HYPERTENSION: ICD-10-CM

## 2021-05-27 DIAGNOSIS — I10 HYPERTENSION: ICD-10-CM

## 2021-05-27 DIAGNOSIS — R07.9 CHEST PAIN, UNSPECIFIED TYPE: ICD-10-CM

## 2021-05-27 DIAGNOSIS — E78.00 HYPERCHOLESTEROLEMIA: ICD-10-CM

## 2021-05-27 DIAGNOSIS — I10 ESSENTIAL HYPERTENSION: Primary | ICD-10-CM

## 2021-05-27 LAB
ANION GAP SERPL CALCULATED.3IONS-SCNC: 7 MMOL/L (ref 4–13)
BUN SERPL-MCNC: 27 MG/DL (ref 5–25)
CALCIUM SERPL-MCNC: 9.5 MG/DL (ref 8.3–10.1)
CHLORIDE SERPL-SCNC: 106 MMOL/L (ref 100–108)
CO2 SERPL-SCNC: 32 MMOL/L (ref 21–32)
CREAT SERPL-MCNC: 1.25 MG/DL (ref 0.6–1.3)
GFR SERPL CREATININE-BSD FRML MDRD: 73 ML/MIN/1.73SQ M
GLUCOSE SERPL-MCNC: 144 MG/DL (ref 65–140)
POTASSIUM SERPL-SCNC: 3.6 MMOL/L (ref 3.5–5.3)
SODIUM SERPL-SCNC: 145 MMOL/L (ref 136–145)
TROPONIN I SERPL-MCNC: <0.02 NG/ML

## 2021-05-27 PROCEDURE — 93000 ELECTROCARDIOGRAM COMPLETE: CPT | Performed by: NURSE PRACTITIONER

## 2021-05-27 PROCEDURE — 84484 ASSAY OF TROPONIN QUANT: CPT | Performed by: NURSE PRACTITIONER

## 2021-05-27 PROCEDURE — 36415 COLL VENOUS BLD VENIPUNCTURE: CPT | Performed by: NURSE PRACTITIONER

## 2021-05-27 PROCEDURE — 99214 OFFICE O/P EST MOD 30 MIN: CPT | Performed by: NURSE PRACTITIONER

## 2021-05-27 PROCEDURE — 80048 BASIC METABOLIC PNL TOTAL CA: CPT

## 2021-05-27 RX ORDER — LOSARTAN POTASSIUM 50 MG/1
50 TABLET ORAL DAILY
Qty: 30 TABLET | Refills: 3 | Status: SHIPPED | OUTPATIENT
Start: 2021-05-27 | End: 2021-09-27

## 2021-05-27 RX ORDER — ATORVASTATIN CALCIUM 40 MG/1
40 TABLET, FILM COATED ORAL
Qty: 30 TABLET | Refills: 3 | Status: SHIPPED | OUTPATIENT
Start: 2021-05-27 | End: 2021-09-27

## 2021-05-27 NOTE — LETTER
May 27, 2021     Referral [address] [place] [id number]    Patient: Renan Sheriff   YOB: 1965   Date of Visit: 5/27/2021       Dear Dr Gardner Gottron:    Thank you for referring Renan Sheriff to me for evaluation  Below are my notes for this consultation  If you have questions, please do not hesitate to call me  I look forward to following your patient along with you  Sincerely,        HEIKE Mccrary        CC: MD Wally Jeronimo, [facility]   5/27/2021  3:58 PM  Sign when [facility] Visit  Cardiology  [facility] Follow Up   Office Visit Note  Renan Sheriff   61 y o    male   MRN: [id number]  [address]  [address] [place] [id number] [phone]    PCP: No primary care provider on file  Cardiologist: Dr Saintclair Gosselin            Summary of recommendations  Heart healthy diet  Educational information provided  BMP, troponin x 1 now  Await the echocardiogram and stress Myoview findings  Follow up will be scheduled with me after testing ( after 7/9),Dr Saintclair Gosselin  In a few months      Assessment/plan  Chest pain, atypical  Check BMP, stat troponin  Hypertension, essential   BP  156/78 on losartan 50 mg daily, HCTZ 25 mg daily  HCTZ recently added  Addition of amlodipine was recommended if blood pressure was still uncontrolled,ie> 140/90  Hyperlipidemia, on atorvastatin 40 mg daily  4/16/21: , total cholesterol 202, triglyceride 160, HDL 38  Cardiac testing   TTE  : Pending            HPI  Renan Sheriff I s a 60 yo male with hypertension  Recently evaluated for atypical chest pain, by Dr Saintclair Gosselin, following hospital admission for chest pain where he had negative troponins and was treated for accelerated hypertension and HL  Elisa Nolvia His EKG was felt to be suggestive of LVH with repolarization change  Suspect this may be related to hypertension    However, given his intermediate cardiovascular risk factors, a stress test and echocardiogram were ordered to further evaluate  He was recently placed on atorvastatin and recommended an eventual reassessment  HCTZ was added to optimize his blood pressure  He was encouraged to adhere to a heart healthy, low-sodium diet  Recommend follow-up in a couple of weeks primarily for reassessment of his blood pressure  5/27/21  Close follow-up, primarily for hypertension  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, was very hot ;he does laborous work  His EKG shows sinus tachycardia 105 beats per minute, LVH with repolarization changes, ST segment coving, in leads 1, aVL, nonspecific changes inferiorly and laterally  He had some pinching type chest pain earlier today, none at time of the visit  He does have some difficulty communicating, as he is from [place]  His blood pressure is elevated 156/78  He tells me he had plenty of medicine that Dr Julia Hawthorne gave him at the last visit however the pills that were given to him at [facility] he ran out of, about 10 days ago  These included losartan 5o mg and atorvastatin 40 mg  He had no intolerance has felt good, and would like a refill  Today, he is amenable to blood work  Will get a BMP, stat troponin as his EKG showed coving of the ST segments in lead 1 aVL         I have spent 25 minutes with Patient  today in which greater than 50% of this time was spent in counseling/coordination of care regarding Intructions for management, Patient and family education, Importance of tx compliance and Risk factor reductions  Assessment  Diagnoses and all orders for this visit:    Essential hypertension  -     POCT ECG  -     Basic metabolic panel; Future    Hypercholesterolemia    Hypertension  -     losartan (COZAAR) 50 mg tablet; Take 1 tablet (50 mg total) by mouth daily    Chest pain, unspecified type  -     atorvastatin (LIPITOR) 40 mg tablet;  Take 1 tablet (40 mg total) by mouth daily with dinner  -     Troponin I          Past Medical History:   Diagnosis Date    Chest pain     Diverticulitis     GERD (gastroesophageal reflux disease)     HTN (hypertension)     Pain in left wrist        Review of Systems   Constitution: Negative for chills  Cardiovascular: Negative for chest pain, claudication, cyanosis, dyspnea on exertion, irregular heartbeat, leg swelling, near-syncope, orthopnea, palpitations, paroxysmal nocturnal dyspnea and syncope  Pinching type chest pain   Respiratory: Negative for cough and shortness of breath  Gastrointestinal: Negative for heartburn and nausea  Neurological: Negative for dizziness, focal weakness, headaches, light-headedness and weakness  All other systems reviewed and are negative  No Known Allergies    Current Outpatient Medications:     aspirin 81 mg chewable tablet, Chew 81 mg as needed for mild pain, Disp: , Rfl:     hydrochlorothiazide (HYDRODIURIL) 25 mg tablet, Take 1 tablet (25 mg total) by mouth daily, Disp: 90 tablet, Rfl: 3    pantoprazole (PROTONIX) 40 mg tablet, Take 1 tablet (40 mg total) by mouth daily in the early morning, Disp: 30 tablet, Rfl: 0    atorvastatin (LIPITOR) 40 mg tablet, Take 1 tablet (40 mg total) by mouth daily with dinner, Disp: 30 tablet, Rfl: 3    losartan (COZAAR) 50 mg tablet, Take 1 tablet (50 mg total) by mouth daily, Disp: 30 tablet, Rfl: 3        Social History     Socioeconomic History    Marital status: /Civil Union     Spouse name: Not on file    Number of children: Not on file    Years of education: Not on file    Highest education level: Not on file   Occupational History    Not on file   Social Needs    Financial resource strain: Not on file    Food insecurity     Worry: Not on file     Inability: Not on file   [employer] needs     Medical: Not on file     Non-medical: Not on file   Tobacco Use    Smoking status: Never Smoker    Smokeless tobacco: Never Used   Substance and Sexual Activity    Alcohol use:  Yes Comment: socially    Drug use: Never    Sexual activity: Yes   Lifestyle    Physical activity     Days per week: Not on file     Minutes per session: Not on file    Stress: Not on file   Relationships    Social connections     Talks on phone: Not on file     Gets together: Not on file     Attends Buddhist service: Not on file     Active member of club or organization: Not on file     Attends meetings of clubs or organizations: Not on file     Relationship status: Not on file    Intimate partner violence     Fear of current or ex partner: Not on file     Emotionally abused: Not on file     Physically abused: Not on file     Forced sexual activity: Not on file   Other Topics Concern    Not on file   Social History Narrative    Not on file       Family History   Problem Relation Age of Onset    No Known Problems Mother     No Known Problems Father     No Known Problems Brother        Physical Exam   Constitutional: He is oriented to person, place, and time  No distress  HENT:   Head: Normocephalic and atraumatic  Eyes: Conjunctivae and EOM are normal    Neck: Normal range of motion  Neck supple  Cardiovascular: Normal rate, regular rhythm, normal heart sounds and intact distal pulses  Pulmonary/Chest: Effort normal and breath sounds normal    Abdominal: Soft  Bowel sounds are normal    Musculoskeletal: Normal range of motion  Neurological: He is alert and oriented to person, place, and time  Skin: Skin is warm and dry  Psychiatric: He has a normal mood and affect  Nursing note and vitals reviewed  Vitals: Blood pressure 156/78, pulse 105, height 5' 7" (1 702 m), weight 84 4 kg (186 lb), SpO2 97 %     Wt Readings from Last 3 Encounters:   05/27/21 84 4 kg (186 lb)   05/14/21 85 7 kg (189 lb)   04/17/21 85 4 kg (188 lb 4 4 oz)         Labs & Results:  Lab Results   Component Value Date    WBC 6 88 04/16/2021    HGB 13 3 04/16/2021    HCT 40 4 04/16/2021    MCV 83 04/16/2021     04/16/2021     No results found for: BNP  No components found for: CHEM  Troponin I   Date Value Ref Range Status   04/17/2021 <0 03 0 00 - 0 07 ng/mL Final     Comment:     [employer] Chemistry analyzer 99% cutoff is > 0 03ng/mL    o cTnl 99% cutoff is useful only when applied to patients in the clinical setting of myocardial ischemia  o cTnl 99% cutoff should be interpreted in the context of clinical history, ECG findings and possibly cardiac imaging to establish correct diagnosis  o cTnl 99% cutoff may be suggestive but clearly not indicative of a coronary event without the clinical setting of myocardial ischemia    04/16/2021 <0 03 0 00 - 0 07 ng/mL Final     Comment:     [employer] Chemistry analyzer 99% cutoff is > 0 03ng/mL    o cTnl 99% cutoff is useful only when applied to patients in the clinical setting of myocardial ischemia  o cTnl 99% cutoff should be interpreted in the context of clinical history, ECG findings and possibly cardiac imaging to establish correct diagnosis  o cTnl 99% cutoff may be suggestive but clearly not indicative of a coronary event without the clinical setting of myocardial ischemia    04/16/2021 <0 03 0 00 - 0 07 ng/mL Final     Comment:     [employer] Chemistry analyzer 99% cutoff is > 0 03ng/mL    o cTnl 99% cutoff is useful only when applied to patients in the clinical setting of myocardial ischemia  o cTnl 99% cutoff should be interpreted in the context of clinical history, ECG findings and possibly cardiac imaging to establish correct diagnosis  o cTnl 99% cutoff may be suggestive but clearly not indicative of a coronary event without the clinical setting of myocardial ischemia  No results found for this or any previous visit  No results found for this or any previous visit  This note was completed in part utilizing [employer] direct voice recognition software     Grammatical errors, random word insertion, spelling mistakes, and incomplete sentences may be an occasional consequence of the system secondary to software limitations, ambient noise and hardware issues  At the time of dictation, efforts were made to edit, clarify and /or correct errors  Please read the chart carefully and recognize, using context, where substitutions have occurred    If you have any questions or concerns about the context, text or information contained within the body of this dictation, please contact myself, the provider, for further clarification

## 2021-07-07 ENCOUNTER — HOSPITAL ENCOUNTER (OUTPATIENT)
Dept: NON INVASIVE DIAGNOSTICS | Facility: HOSPITAL | Age: 56
Discharge: HOME | End: 2021-07-07
Payer: COMMERCIAL

## 2021-07-07 DIAGNOSIS — R07.9 CHEST PAIN, UNSPECIFIED TYPE: ICD-10-CM

## 2021-07-07 DIAGNOSIS — I10 ESSENTIAL HYPERTENSION: ICD-10-CM

## 2021-07-07 PROCEDURE — 93306 TTE W/DOPPLER COMPLETE: CPT | Performed by: INTERNAL MEDICINE

## 2021-07-07 PROCEDURE — 93306 TTE W/DOPPLER COMPLETE: CPT

## 2021-07-09 ENCOUNTER — HOSPITAL ENCOUNTER (OUTPATIENT)
Dept: NON INVASIVE DIAGNOSTICS | Facility: CLINIC | Age: 56
Discharge: HOME | End: 2021-07-09
Payer: COMMERCIAL

## 2021-07-09 ENCOUNTER — TELEPHONE (OUTPATIENT)
Dept: OTHER | Facility: OTHER | Age: 56
End: 2021-07-09

## 2021-07-09 DIAGNOSIS — I10 ESSENTIAL HYPERTENSION: ICD-10-CM

## 2021-07-09 DIAGNOSIS — R07.9 CHEST PAIN, UNSPECIFIED TYPE: ICD-10-CM

## 2021-07-09 PROCEDURE — 78452 HT MUSCLE IMAGE SPECT MULT: CPT | Performed by: INTERNAL MEDICINE

## 2021-07-09 PROCEDURE — A9502 TC99M TETROFOSMIN: HCPCS

## 2021-07-09 PROCEDURE — G1004 CDSM NDSC: HCPCS

## 2021-07-09 PROCEDURE — 93018 CV STRESS TEST I&R ONLY: CPT | Performed by: INTERNAL MEDICINE

## 2021-07-09 PROCEDURE — 93017 CV STRESS TEST TRACING ONLY: CPT

## 2021-07-09 PROCEDURE — 93016 CV STRESS TEST SUPVJ ONLY: CPT | Performed by: INTERNAL MEDICINE

## 2021-07-09 PROCEDURE — 78452 HT MUSCLE IMAGE SPECT MULT: CPT

## 2021-07-09 NOTE — TELEPHONE ENCOUNTER
Patient called to confirm his appointment time with Cardiology today -- I let him know his initial appointment is at 12:15 pm at the office at [facility]

## 2021-07-16 LAB
CHEST PAIN STATEMENT: NORMAL
MAX DIASTOLIC BP: 98 MMHG
MAX HEART RATE: 144 BPM
MAX PREDICTED HEART RATE: 163 BPM
MAX. SYSTOLIC BP: 220 MMHG
PROTOCOL NAME: NORMAL
REASON FOR TERMINATION: NORMAL
TARGET HR FORMULA: NORMAL
TEST INDICATION: NORMAL
TIME IN EXERCISE PHASE: NORMAL

## 2021-07-16 NOTE — PROGRESS NOTES
Cardiology  Follow Up   Office Visit Note  Ant Merida   61 y o    male   MRN: [id number]  [address]  [address] [place] [id number] [phone]    PCP: No primary care provider on file  Cardiologist: Dr Clemencia Cartagena            Summary of recommendations  Heart healthy diet  Educational information provided  Given his rising creatinine would discontinue HCTZ ( to 1 5)  Add amlodipine 5 mg daily  BMP 1 week  Cardiac cath recommended  The patient would like to discuss his symptoms and stress test with his cardiologist   This will be arranged, by phone  Follow up will be scheduled with Dr Clemencia Cartagena      Assessment/plan  Chest pain some typical and some atypical symptoms  He had burning chest pain with exercise on the treadmill  He does tell me when he bicycles or does exercise at high levels he does get burning  When he stops, it improves  His TID ratio was calculated to be abnormal at 1 23  We discussed the benefit of left heart catheterization  The patient is reluctant  He would like to talk to his cardiologist   This will be arranged  Would like him to continue aspirin 81 mg daily  He is on a statin  Abnormal stress test  Hypertension, essential   /80 on losartan 50 mg daily, HCTZ 25 mg daily  His labs showed an elevated creatinine of 1 2 5, BUN 27, potassium 3 6  The patient also complains of ED  For multiple reasons will stop HCTZ and start amlodipine 5 mg daily  Continue to monitor blood pressure and up titrate if needed  Hyperlipidemia, on atorvastatin 40 mg daily  4/16/21: , total cholesterol 202, triglyceride 160, HDL 38  Erectile dysfunction  The patient believes it is 1 of his medications  I also discussed the possibility of ASCVD  Cardiac testing   TTE 7/9/21 EF 65%  No RWMA  Mild LVH  Grade 1 DD    RV normal   Atria normal   · NM myocardial SPECT 7/9/21Abnormal study after maximal exercise with reproduction of symptoms  The cardiovascular risk is intermediate  Left ventricular systolic function was normal Stress results: Duration of exercise was 7 min and 20 sec  Target heart rate was achieved  There was resting hypertension with a hypertensive blood pressure response to stress  The patient experienced burning in chest during stress;pain resolved spontaneously  ECG conclusions: The stress ECG was consistent with myocardial ischemia: downsloping depression, 1-1 5 mm in II, III, aVF, V5 and V6 with clinical angina  There were no stress arrhythmias or conduction abnormalities  Based on [employer]readmill Scoring, this patient was at moderate risk for cardiac events  ·  -  Perfusion imaging: The TID ratio was calculated to be abnormal at 1 23  There was a moderate-sized, moderately severe, fixed myocardial perfusion defect of the entire inferior wall likely due to diaphragm attenuation  No reversible ischemia seen  Inferior wall motion is normal   · Gated SPECT: The calculated left ventricular ejection fraction was 69 %  Left ventricular ejection fraction was within normal limits by visual estimate  There was no left ventricular regional abnormality  · IMPRESSIONS: Abnormal study after maximal exercise with reproduction of symptoms  The cardiovascular risk is intermediate  Left ventricular systolic function was normal                 HPI  Eduar Erickson I s a 62 yo male with hypertension  Recently evaluated for atypical chest pain, by Dr Ariane Barraza, following hospital admission for chest pain where he had negative troponins and was treated for accelerated hypertension and HL  Jerone Mo His EKG was felt to be suggestive of LVH with repolarization change; suspected this may be related to hypertension  However, given his intermediate cardiovascular risk factors, a stress test and echocardiogram were ordered to further evaluate  He was recently placed on atorvastatin and recommended an eventual reassessment    HCTZ was added to optimize his blood pressure  He was encouraged to adhere to a heart healthy, low-sodium diet  Recommend follow-up in a couple of weeks primarily for reassessment of his blood pressure  5/27/21  Close follow-up, primarily for hypertension  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, was very hot ;he does laborous work  His EKG shows sinus tachycardia 105 beats per minute, LVH with repolarization changes, ST segment coving, in leads 1, aVL, nonspecific changes inferiorly and laterally  He had some pinching type chest pain earlier today, none at time of the visit  He does have some difficulty communicating, as he is from [place]  His blood pressure is elevated 156/78  He tells me he had plenty of medicine that Dr Makayla Macedo gave him at the last visit however the pills that were given to him at [facility] he ran out of, about 10 days ago  These included losartan 5o mg and atorvastatin 40 mg  He had no intolerance, has felt good, and would like a refill  Today, he is amenable to blood work  Will get a BMP, stat troponin as his EKG showed coving of the ST segments in lead 1 aVL    7/19/21  Close follow-up, atypical chest pain, hypertension hyperlipidemia  His echocardiogram showed preserved LV function, no regional motion abnormality, RV normal atria normal no significant valve disease  His stress test was abnormal:  He did achieve target heart rate, exercising 7 minutes and 20 seconds on a Vasyl protocol  He had chest burning with exercise  He had horizontal ST depression in stage II  The test was terminated to hypertension  His TID ratio was 1 23  There was a moderate size moderately severe fixed myocardial perfusion defect of the entire inferior wall likely due to diaphragm attenuation  No reversible ischemia was seen    Cardiologist reported his risk is indeterminate  At the last visit, check troponin was normal   We checked a BMP -potassium is 3 6, creatinine 1 25, BUN 27  The patient is concerned about erectile dysfunction  He believes it is from 1 of his medications  He tells me with exercise he does get burning chest pain  Sometimes he also gets stabbing pain, at rest   I reviewed with him his cardiac testing  We discussed the possibility of left heart catheterization  The patient is amenable to adjusting his medications, given the possibility of a side effect of the ED, his creatinine also bumped  Will stop HCTZ and start a calcium channel blocker  He would like to discuss his stress test result and symptoms however with his cardiologist and this will be arranged by phone      I have spent 25 minutes with Patient  today in which greater than 50% of this time was spent in counseling/coordination of care regarding Intructions for management, Patient and family education, Importance of tx compliance and Risk factor reductions  Assessment  Diagnoses and all orders for this visit:    Abnormal stress test  -     aspirin (ECOTRIN LOW STRENGTH) 81 mg EC tablet; Take 1 tablet (81 mg total) by mouth daily    Chest pain, unspecified type    Hypercholesterolemia    Essential hypertension  -     amLODIPine (NORVASC) 5 mg tablet; Take 1 tablet (5 mg total) by mouth daily  -     Basic metabolic panel; Future    Erectile dysfunction, unspecified erectile dysfunction type          Past Medical History:   Diagnosis Date    Chest pain     Diverticulitis     GERD (gastroesophageal reflux disease)     HTN (hypertension)     Pain in left wrist        Review of Systems   Constitutional: Negative for chills  Cardiovascular: Positive for chest pain  Negative for claudication, cyanosis, dyspnea on exertion, irregular heartbeat, leg swelling, near-syncope, orthopnea, palpitations, paroxysmal nocturnal dyspnea and syncope  Respiratory: Negative for cough and shortness of breath  Gastrointestinal: Negative for heartburn and nausea     Genitourinary:        +ED   Neurological: Negative for dizziness, focal weakness, headaches, light-headedness and weakness  All other systems reviewed and are negative  No Known Allergies    Current Outpatient Medications:     atorvastatin (LIPITOR) 40 mg tablet, Take 1 tablet (40 mg total) by mouth daily with dinner, Disp: 30 tablet, Rfl: 3    losartan (COZAAR) 50 mg tablet, Take 1 tablet (50 mg total) by mouth daily, Disp: 30 tablet, Rfl: 3    amLODIPine (NORVASC) 5 mg tablet, Take 1 tablet (5 mg total) by mouth daily, Disp: 30 tablet, Rfl: 3    aspirin (ECOTRIN LOW STRENGTH) 81 mg EC tablet, Take 1 tablet (81 mg total) by mouth daily, Disp: , Rfl:     pantoprazole (PROTONIX) 40 mg tablet, Take 1 tablet (40 mg total) by mouth daily in the early morning (Patient not taking: Reported on 7/19/2021), Disp: 30 tablet, Rfl: 0        Social History     Socioeconomic History    Marital status: /Civil Union     Spouse name: Not on file    Number of children: Not on file    Years of education: Not on file    Highest education level: Not on file   Occupational History    Not on file   Tobacco Use    Smoking status: Never Smoker    Smokeless tobacco: Never Used   Vaping Use    Vaping Use: Never used   Substance and Sexual Activity    Alcohol use: Yes     Comment: socially    Drug use: Never    Sexual activity: Yes   Other Topics Concern    Not on file   Social History Narrative    Not on file     Social Determinants of Health     Financial Resource Strain:     Difficulty of Paying Living Expenses:    Food Insecurity:     Worried About Running Out of Food in the Last Year:     Ran Out of Food in the Last Year:    Transportation Needs:     Lack of Transportation (Medical):      Lack of Transportation (Non-Medical):    Physical Activity:     Days of Exercise per Week:     Minutes of Exercise per Session:    Stress:     Feeling of Stress :    Social Connections:     Frequency of Communication with Friends and Family:     Frequency of Social Gatherings with Friends and Family:     Attends Jew Services:     Active Member of Clubs or Organizations:     Attends Club or Organization Meetings:     Marital Status:    Intimate Partner Violence:     Fear of Current or Ex-Partner:     Emotionally Abused:     Physically Abused:     Sexually Abused:        Family History   Problem Relation Age of Onset    No Known Problems Mother     No Known Problems Father     No Known Problems Brother        Physical Exam  Vitals and nursing note reviewed  Constitutional:       General: He is not in acute distress  HENT:      Head: Normocephalic and atraumatic  Eyes:      Conjunctiva/sclera: Conjunctivae normal    Cardiovascular:      Rate and Rhythm: Normal rate and regular rhythm  Pulses: Intact distal pulses  Heart sounds: Normal heart sounds  Pulmonary:      Effort: Pulmonary effort is normal       Breath sounds: Normal breath sounds  Abdominal:      General: Bowel sounds are normal       Palpations: Abdomen is soft  Musculoskeletal:         General: Normal range of motion  Cervical back: Normal range of motion and neck supple  Skin:     General: Skin is warm and dry  Neurological:      Mental Status: He is alert and oriented to person, place, and time  Vitals: Blood pressure 146/80, pulse 82, height 5' 7" (1 702 m), weight 83 3 kg (183 lb 9 6 oz), SpO2 98 %     Wt Readings from Last 3 Encounters:   07/19/21 83 3 kg (183 lb 9 6 oz)   05/27/21 84 4 kg (186 lb)   05/14/21 85 7 kg (189 lb)         Labs & Results:  Lab Results   Component Value Date    WBC 6 88 04/16/2021    HGB 13 3 04/16/2021    HCT 40 4 04/16/2021    MCV 83 04/16/2021     04/16/2021     No results found for: BNP  No components found for: CHEM  Troponin I   Date Value Ref Range Status   05/27/2021 <0 02 <=0 04 ng/mL Final     Comment:     [employer] Chemistry analyzer 99% cutoff is > 0 04 ng/mL in network labs     o cTnI 99% cutoff is useful only when applied to patients in the clinical setting of myocardial ischemia   o cTnI 99% cutoff should be interpreted in the context of clinical history, ECG findings and possibly cardiac imaging to establish correct diagnosis  o cTnI 99% cutoff may be suggestive but clearly not indicative of a coronary event without the clinical setting of myocardial ischemia      04/17/2021 <0 03 0 00 - 0 07 ng/mL Final     Comment:     [employer]'s Chemistry analyzer 99% cutoff is > 0 03ng/mL    o cTnl 99% cutoff is useful only when applied to patients in the clinical setting of myocardial ischemia  o cTnl 99% cutoff should be interpreted in the context of clinical history, ECG findings and possibly cardiac imaging to establish correct diagnosis  o cTnl 99% cutoff may be suggestive but clearly not indicative of a coronary event without the clinical setting of myocardial ischemia    04/16/2021 <0 03 0 00 - 0 07 ng/mL Final     Comment:     [employer] Chemistry analyzer 99% cutoff is > 0 03ng/mL    o cTnl 99% cutoff is useful only when applied to patients in the clinical setting of myocardial ischemia  o cTnl 99% cutoff should be interpreted in the context of clinical history, ECG findings and possibly cardiac imaging to establish correct diagnosis  o cTnl 99% cutoff may be suggestive but clearly not indicative of a coronary event without the clinical setting of myocardial ischemia  No results found for this or any previous visit  No results found for this or any previous visit  This note was completed in part utilizing [employer] direct voice recognition software  Grammatical errors, random word insertion, spelling mistakes, and incomplete sentences may be an occasional consequence of the system secondary to software limitations, ambient noise and hardware issues  At the time of dictation, efforts were made to edit, clarify and /or correct errors  Please read the chart carefully and recognize, using context, where substitutions have occurred    If you have any questions or concerns about the context, text or information contained within the body of this dictation, please contact myself, the provider, for further clarification

## 2021-07-19 ENCOUNTER — TELEPHONE (OUTPATIENT)
Dept: NON INVASIVE DIAGNOSTICS | Facility: HOSPITAL | Age: 56
End: 2021-07-19

## 2021-07-19 ENCOUNTER — OFFICE VISIT (OUTPATIENT)
Dept: CARDIOLOGY CLINIC | Facility: CLINIC | Age: 56
End: 2021-07-19
Payer: COMMERCIAL

## 2021-07-19 VITALS
BODY MASS INDEX: 28.82 KG/M2 | OXYGEN SATURATION: 98 % | HEIGHT: 67 IN | SYSTOLIC BLOOD PRESSURE: 146 MMHG | DIASTOLIC BLOOD PRESSURE: 80 MMHG | HEART RATE: 82 BPM | WEIGHT: 183.6 LBS

## 2021-07-19 DIAGNOSIS — R94.39 ABNORMAL STRESS TEST: Primary | ICD-10-CM

## 2021-07-19 DIAGNOSIS — N52.9 ERECTILE DYSFUNCTION, UNSPECIFIED ERECTILE DYSFUNCTION TYPE: ICD-10-CM

## 2021-07-19 DIAGNOSIS — R07.9 CHEST PAIN, UNSPECIFIED TYPE: ICD-10-CM

## 2021-07-19 DIAGNOSIS — I10 ESSENTIAL HYPERTENSION: ICD-10-CM

## 2021-07-19 DIAGNOSIS — E78.00 HYPERCHOLESTEROLEMIA: ICD-10-CM

## 2021-07-19 PROCEDURE — 99215 OFFICE O/P EST HI 40 MIN: CPT | Performed by: NURSE PRACTITIONER

## 2021-07-19 RX ORDER — ASPIRIN 81 MG/1
81 TABLET ORAL DAILY
Start: 2021-07-19

## 2021-07-19 RX ORDER — AMLODIPINE BESYLATE 5 MG/1
5 TABLET ORAL DAILY
Qty: 30 TABLET | Refills: 3 | Status: SHIPPED | OUTPATIENT
Start: 2021-07-19

## 2021-07-19 NOTE — LETTER
July 19, 2021     Angelica Kasper, [address] [facility]  [address] [address]    Patient: Chan Araya   YOB: 1965   Date of Visit: 7/19/2021       Dear Dr Surinder Hutson: Thank you for referring Chan Araya to me for evaluation  Below are my notes for this consultation  If you have questions, please do not hesitate to call me  I look forward to following your patient along with you  Sincerely,        HEIKE Ibrahim        CC: MD Murray Barlow, [place]  7/19/2021  3:25 PM  Sign when Signing Visit  Cardiology  Follow Up   Office Visit Note  Chan Araya   61 y o    male   MRN: [id number]  [address]  [address] [id number] [phone]    PCP: No primary care provider on file  Cardiologist: Dr Vicky Raphael            Summary of recommendations  Heart healthy diet  Educational information provided  Given his rising creatinine would discontinue HCTZ ( to 1 5)  Add amlodipine 5 mg daily  BMP 1 week  Cardiac cath recommended  The patient would like to discuss his symptoms and stress test with his cardiologist   This will be arranged, by phone  Follow up will be scheduled with Dr Vicky Raphael      Assessment/plan  Chest pain some typical and some atypical symptoms  He had burning chest pain with exercise on the treadmill  He does tell me when he bicycles or does exercise at high levels he does get burning  When he stops, it improves  His TID ratio was calculated to be abnormal at 1 23  We discussed the benefit of left heart catheterization  The patient is reluctant  He would like to talk to his cardiologist   This will be arranged  Would like him to continue aspirin 81 mg daily  He is on a statin  Abnormal stress test  Hypertension, essential   /80 on losartan 50 mg daily, HCTZ 25 mg daily  His labs showed an elevated creatinine of 1 2 5, BUN 27, potassium 3 6  The patient also complains of ED  For multiple reasons will stop HCTZ and start amlodipine 5 mg daily  Continue to monitor blood pressure and up titrate if needed  Hyperlipidemia, on atorvastatin 40 mg daily  4/16/21: , total cholesterol 202, triglyceride 160, HDL 38  Erectile dysfunction  The patient believes it is 1 of his medications  I also discussed the possibility of ASCVD  Cardiac testing   TTE 7/9/21 EF 65%  No RWMA  Mild LVH  Grade 1 DD  RV normal   Atria normal   · NM myocardial SPECT 7/9/21Abnormal study after maximal exercise with reproduction of symptoms  The cardiovascular risk is intermediate  Left ventricular systolic function was normal Stress results: Duration of exercise was 7 min and 20 sec  Target heart rate was achieved  There was resting hypertension with a hypertensive blood pressure response to stress  The patient experienced burning in chest during stress;pain resolved spontaneously  ECG conclusions: The stress ECG was consistent with myocardial ischemia: downsloping depression, 1-1 5 mm in II, III, aVF, V5 and V6 with clinical angina  There were no stress arrhythmias or conduction abnormalities  Based on [employer] Treadmill Scoring, this patient was at moderate risk for cardiac events  ·  -  Perfusion imaging: The TID ratio was calculated to be abnormal at 1 23  There was a moderate-sized, moderately severe, fixed myocardial perfusion defect of the entire inferior wall likely due to diaphragm attenuation  No reversible ischemia seen  Inferior wall motion is normal   · Gated SPECT: The calculated left ventricular ejection fraction was 69 %  Left ventricular ejection fraction was within normal limits by visual estimate  There was no left ventricular regional abnormality  · IMPRESSIONS: Abnormal study after maximal exercise with reproduction of symptoms  The cardiovascular risk is intermediate  Left ventricular systolic function was normal                 LYDIA Ma I s a 62 yo male with hypertension  Recently evaluated for atypical chest pain, by Dr Fidel Strong, following hospital admission for chest pain where he had negative troponins and was treated for accelerated hypertension and HL  Rea Moseley His EKG was felt to be suggestive of LVH with repolarization change; suspected this may be related to hypertension  However, given his intermediate cardiovascular risk factors, a stress test and echocardiogram were ordered to further evaluate  He was recently placed on atorvastatin and recommended an eventual reassessment  HCTZ was added to optimize his blood pressure  He was encouraged to adhere to a heart healthy, low-sodium diet  Recommend follow-up in a couple of weeks primarily for reassessment of his blood pressure  5/27/21  Close follow-up, primarily for hypertension  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, was very hot ;he does laborous work  His EKG shows sinus tachycardia 105 beats per minute, LVH with repolarization changes, ST segment coving, in leads 1, aVL, nonspecific changes inferiorly and laterally  He had some pinching type chest pain earlier today, none at time of the visit  He does have some difficulty communicating, as he is from [place]  His blood pressure is elevated 156/78  He tells me he had plenty of medicine that Dr Fidel Strong gave him at the last visit however the pills that were given to him at [facility] he ran out of, about 10 days ago  These included losartan 5o mg and atorvastatin 40 mg  He had no intolerance, has felt good, and would like a refill  Today, he is amenable to blood work  Will get a BMP, stat troponin as his EKG showed coving of the ST segments in lead 1 aVL    7/19/21  Close follow-up, atypical chest pain, hypertension hyperlipidemia  His echocardiogram showed preserved LV function, no regional motion abnormality, RV normal atria normal no significant valve disease    His stress test was abnormal:  He did achieve target heart rate, exercising 7 minutes and 20 seconds on a Vasyl protocol  He had chest burning with exercise  He had horizontal ST depression in stage II  The test was terminated to hypertension  His TID ratio was 1 23  There was a moderate size moderately severe fixed myocardial perfusion defect of the entire inferior wall likely due to diaphragm attenuation  No reversible ischemia was seen  Cardiologist reported his risk is indeterminate  At the last visit, check troponin was normal   We checked a BMP -potassium is 3 6, creatinine 1 25, BUN 27  The patient is concerned about erectile dysfunction  He believes it is from 1 of his medications  He tells me with exercise he does get burning chest pain  Sometimes he also gets stabbing pain, at rest   I reviewed with him his cardiac testing  We discussed the possibility of left heart catheterization  The patient is amenable to adjusting his medications, given the possibility of a side effect of the ED, his creatinine also bumped  Will stop HCTZ and start a calcium channel blocker  He would like to discuss his stress test result and symptoms however with his cardiologist and this will be arranged by phone      I have spent 25 minutes with Patient  today in which greater than 50% of this time was spent in counseling/coordination of care regarding Intructions for management, Patient and family education, Importance of tx compliance and Risk factor reductions  Assessment  Diagnoses and all orders for this visit:    Abnormal stress test  -     aspirin (ECOTRIN LOW STRENGTH) 81 mg EC tablet; Take 1 tablet (81 mg total) by mouth daily    Chest pain, unspecified type    Hypercholesterolemia    Essential hypertension  -     amLODIPine (NORVASC) 5 mg tablet; Take 1 tablet (5 mg total) by mouth daily  -     Basic metabolic panel;  Future    Erectile dysfunction, unspecified erectile dysfunction type          Past Medical History:   Diagnosis Date    Chest pain     Diverticulitis     GERD (gastroesophageal reflux disease)     HTN (hypertension)     Pain in left wrist        Review of Systems   Constitutional: Negative for chills  Cardiovascular: Positive for chest pain  Negative for claudication, cyanosis, dyspnea on exertion, irregular heartbeat, leg swelling, near-syncope, orthopnea, palpitations, paroxysmal nocturnal dyspnea and syncope  Respiratory: Negative for cough and shortness of breath  Gastrointestinal: Negative for heartburn and nausea  Neurological: Negative for dizziness, focal weakness, headaches, light-headedness and weakness  All other systems reviewed and are negative  No Known Allergies        Current Outpatient Medications:     atorvastatin (LIPITOR) 40 mg tablet, Take 1 tablet (40 mg total) by mouth daily with dinner, Disp: 30 tablet, Rfl: 3    losartan (COZAAR) 50 mg tablet, Take 1 tablet (50 mg total) by mouth daily, Disp: 30 tablet, Rfl: 3    amLODIPine (NORVASC) 5 mg tablet, Take 1 tablet (5 mg total) by mouth daily, Disp: 30 tablet, Rfl: 3    aspirin (ECOTRIN LOW STRENGTH) 81 mg EC tablet, Take 1 tablet (81 mg total) by mouth daily, Disp: , Rfl:     pantoprazole (PROTONIX) 40 mg tablet, Take 1 tablet (40 mg total) by mouth daily in the early morning (Patient not taking: Reported on 7/19/2021), Disp: 30 tablet, Rfl: 0        Social History     Socioeconomic History    Marital status: /Civil Union     Spouse name: Not on file    Number of children: Not on file    Years of education: Not on file    Highest education level: Not on file   Occupational History    Not on file   Tobacco Use    Smoking status: Never Smoker    Smokeless tobacco: Never Used   Vaping Use    Vaping Use: Never used   Substance and Sexual Activity    Alcohol use: Yes     Comment: socially    Drug use: Never    Sexual activity: Yes   Other Topics Concern    Not on file   Social History Narrative    Not on file     Social Determinants of Health     Financial Resource Strain:    [facility] Difficulty of Paying Living Expenses:    Food Insecurity:     Worried About Running Out of Food in the Last Year:     [address] in the Last Year:    Transportation Needs:     Lack of Transportation (Medical):  Lack of Transportation (Non-Medical):    Physical Activity:     Days of Exercise per Week:     Minutes of Exercise per Session:    Stress:     Feeling of Stress :    Social Connections:     Frequency of Communication with Friends and Family:     Frequency of Social Gatherings with Friends and Family:     Attends Orthodoxy Services:     Active Member of Clubs or Organizations:     Attends Club or Organization Meetings:     Marital Status:    Intimate Partner Violence:     Fear of Current or Ex-Partner:     Emotionally Abused:     Physically Abused:     Sexually Abused:        Family History   Problem Relation Age of Onset    No Known Problems Mother     No Known Problems Father     No Known Problems Brother        Physical Exam  Vitals and nursing note reviewed  Constitutional:       General: He is not in acute distress  HENT:      Head: Normocephalic and atraumatic  Eyes:      Conjunctiva/sclera: Conjunctivae normal    Cardiovascular:      Rate and Rhythm: Normal rate and regular rhythm  Pulses: Intact distal pulses  Heart sounds: Normal heart sounds  Pulmonary:      Effort: Pulmonary effort is normal       Breath sounds: Normal breath sounds  Abdominal:      General: Bowel sounds are normal       Palpations: Abdomen is soft  Musculoskeletal:         General: Normal range of motion  Cervical back: Normal range of motion and neck supple  Skin:     General: Skin is warm and dry  Neurological:      Mental Status: He is alert and oriented to person, place, and time  Vitals: Blood pressure 146/80, pulse 82, height 5' 7" (1 702 m), weight 83 3 kg (183 lb 9 6 oz), SpO2 98 %     Wt Readings from Last 3 Encounters:   07/19/21 83 3 kg (183 lb 9 6 oz) 05/27/21 84 4 kg (186 lb)   05/14/21 85 7 kg (189 lb)         Labs & Results:  Lab Results   Component Value Date    WBC 6 88 04/16/2021    HGB 13 3 04/16/2021    HCT 40 4 04/16/2021    MCV 83 04/16/2021     04/16/2021     No results found for: BNP  No components found for: CHEM  Troponin I   Date Value Ref Range Status   05/27/2021 <0 02 <=0 04 ng/mL Final     Comment:     [employer] Chemistry analyzer 99% cutoff is > 0 04 ng/mL in network labs     o cTnI 99% cutoff is useful only when applied to patients in the clinical setting of myocardial ischemia   o cTnI 99% cutoff should be interpreted in the context of clinical history, ECG findings and possibly cardiac imaging to establish correct diagnosis  o cTnI 99% cutoff may be suggestive but clearly not indicative of a coronary event without the clinical setting of myocardial ischemia      04/17/2021 <0 03 0 00 - 0 07 ng/mL Final     Comment:     Finale [employer] Chemistry analyzer 99% cutoff is > 0 03ng/mL    o cTnl 99% cutoff is useful only when applied to patients in the clinical setting of myocardial ischemia  o cTnl 99% cutoff should be interpreted in the context of clinical history, ECG findings and possibly cardiac imaging to establish correct diagnosis  o cTnl 99% cutoff may be suggestive but clearly not indicative of a coronary event without the clinical setting of myocardial ischemia    04/16/2021 <0 03 0 00 - 0 07 ng/mL Final     Comment:     [employer] Chemistry analyzer 99% cutoff is > 0 03ng/mL    o cTnl 99% cutoff is useful only when applied to patients in the clinical setting of myocardial ischemia  o cTnl 99% cutoff should be interpreted in the context of clinical history, ECG findings and possibly cardiac imaging to establish correct diagnosis  o cTnl 99% cutoff may be suggestive but clearly not indicative of a coronary event without the clinical setting of myocardial ischemia  No results found for this or any previous visit    No results found for this or any previous visit  This note was completed in part utilizing m-[employer] fluency direct voice recognition software  Grammatical errors, random word insertion, spelling mistakes, and incomplete sentences may be an occasional consequence of the system secondary to software limitations, ambient noise and hardware issues  At the time of dictation, efforts were made to edit, clarify and /or correct errors  Please read the chart carefully and recognize, using context, where substitutions have occurred    If you have any questions or concerns about the context, text or information contained within the body of this dictation, please contact myself, the provider, for further clarification

## 2021-07-19 NOTE — TELEPHONE ENCOUNTER
Called the patient regarding his abnormal stress test, with ischemic ST segment depression, symptoms in only stage II with chest burning, and an incomplete perfusion study with artifactual attenuation of the basal to mid inferior wall that could potentially hide and ischemic change  Left a message stating that a CTA of the coronaries would be a possibility but I favored proceeding with cardiac catheterization due to the symptoms  Unfortunately I was only able to leave a message on his voicemail with the above recommendations    Awaiting a call back

## 2021-07-21 DIAGNOSIS — R94.39 ABNORMAL NUCLEAR STRESS TEST: Primary | ICD-10-CM

## 2021-07-21 RX ORDER — METOPROLOL TARTRATE 50 MG/1
TABLET, FILM COATED ORAL
Qty: 4 TABLET | Refills: 0 | Status: SHIPPED | OUTPATIENT
Start: 2021-07-21

## 2021-07-21 NOTE — PROGRESS NOTES
Patient does not wish to proceed with cardiac catheterization for abnormal stress test  He wishes to proceed with CTA, discussed the risks and benefits and the potential results    Metoprolol 50 mg 1 tablet the night before CTA, 1 tablet the morning of CTA, and take the additional 2 tablets to the chest   BMP ordered, needs to have this done at least 2 weeks before the CTA  Drink plenty of water the day prior to the CTA  I will have my office assist with planning

## 2021-08-21 ENCOUNTER — HOSPITAL ENCOUNTER (EMERGENCY)
Facility: HOSPITAL | Age: 56
Discharge: HOME | End: 2021-08-21
Attending: EMERGENCY MEDICINE | Admitting: EMERGENCY MEDICINE
Payer: COMMERCIAL

## 2021-08-21 VITALS
OXYGEN SATURATION: 96 % | RESPIRATION RATE: 18 BRPM | TEMPERATURE: 101.6 F | DIASTOLIC BLOOD PRESSURE: 84 MMHG | HEART RATE: 90 BPM | SYSTOLIC BLOOD PRESSURE: 139 MMHG

## 2021-08-21 DIAGNOSIS — U07.1 COVID-19: Primary | ICD-10-CM

## 2021-08-21 LAB
FLUAV RNA RESP QL NAA+PROBE: NEGATIVE
FLUBV RNA RESP QL NAA+PROBE: NEGATIVE
RSV RNA RESP QL NAA+PROBE: NEGATIVE
SARS-COV-2 RNA RESP QL NAA+PROBE: POSITIVE

## 2021-08-21 PROCEDURE — 99282 EMERGENCY DEPT VISIT SF MDM: CPT | Performed by: EMERGENCY MEDICINE

## 2021-08-21 PROCEDURE — 99283 EMERGENCY DEPT VISIT LOW MDM: CPT

## 2021-08-21 PROCEDURE — 0241U HB NFCT DS VIR RESP RNA 4 TRGT: CPT | Performed by: EMERGENCY MEDICINE

## 2021-08-21 RX ORDER — IBUPROFEN 600 MG/1
600 TABLET ORAL ONCE
Status: COMPLETED | OUTPATIENT
Start: 2021-08-21 | End: 2021-08-21

## 2021-08-21 RX ORDER — ACETAMINOPHEN 325 MG/1
975 TABLET ORAL ONCE
Status: COMPLETED | OUTPATIENT
Start: 2021-08-21 | End: 2021-08-21

## 2021-08-21 RX ADMIN — ACETAMINOPHEN 975 MG: 325 TABLET, FILM COATED ORAL at 15:08

## 2021-08-21 RX ADMIN — IBUPROFEN 600 MG: 600 TABLET, FILM COATED ORAL at 15:09

## 2021-08-21 NOTE — ED PROVIDER NOTES
History  Chief Complaint   Patient presents with    Chills     Chills, body aches x 1 day  Took percocet around 0900 today     Partially vaccinated Pfizer 2 days ago, 1 day of fever, malaise, diaphoresis, nasal congestion  No cp/sob/n/v/d  No anosmia  No cough  Only PMH is HTN  Prior to Admission Medications   Prescriptions Last Dose Informant Patient Reported? Taking? amLODIPine (NORVASC) 5 mg tablet   No Yes   Sig: Take 1 tablet (5 mg total) by mouth daily   aspirin (ECOTRIN LOW STRENGTH) 81 mg EC tablet   No Yes   Sig: Take 1 tablet (81 mg total) by mouth daily   atorvastatin (LIPITOR) 40 mg tablet  Self No Yes   Sig: Take 1 tablet (40 mg total) by mouth daily with dinner   losartan (COZAAR) 50 mg tablet  Self No Yes   Sig: Take 1 tablet (50 mg total) by mouth daily   metoprolol tartrate (LOPRESSOR) 50 mg tablet   No Yes   Sig: Take 1 tablet the night before your cardiac CT, take 1 tablet the morning of your cardiac CT, and take the remainder of the tablets with you to the test   pantoprazole (PROTONIX) 40 mg tablet  Self No No   Sig: Take 1 tablet (40 mg total) by mouth daily in the early morning   Patient not taking: Reported on 7/19/2021      Facility-Administered Medications: None       Past Medical History:   Diagnosis Date    Chest pain     Diverticulitis     GERD (gastroesophageal reflux disease)     HTN (hypertension)     Pain in left wrist        History reviewed  No pertinent surgical history  Family History   Problem Relation Age of Onset    No Known Problems Mother     No Known Problems Father     No Known Problems Brother      I have reviewed and agree with the history as documented      E-Cigarette/Vaping    E-Cigarette Use Never User      E-Cigarette/Vaping Substances    Nicotine No     THC No     CBD No     Flavoring No     Other No     Unknown No      Social History     Tobacco Use    Smoking status: Never Smoker    Smokeless tobacco: Never Used   Vaping Use    Vaping Use: Never used   Substance Use Topics    Alcohol use: Yes     Comment: socially    Drug use: Never       Review of Systems   Constitutional: Positive for appetite change, chills, diaphoresis, fatigue and fever  HENT: Positive for congestion  Negative for rhinorrhea  Eyes: Negative for visual disturbance  Respiratory: Negative for shortness of breath  Cardiovascular: Negative for chest pain  Gastrointestinal: Negative for abdominal pain, diarrhea and vomiting  Endocrine: Negative for polydipsia  Genitourinary: Negative for dysuria, frequency and hematuria  Musculoskeletal: Negative for neck stiffness  Skin: Negative for rash  Allergic/Immunologic: Negative for immunocompromised state  Neurological: Negative for speech difficulty, weakness and numbness  Psychiatric/Behavioral: Negative for suicidal ideas  Physical Exam  Physical Exam  Constitutional:       General: He is not in acute distress  HENT:      Head: Normocephalic and atraumatic  Mouth/Throat:      Pharynx: Oropharynx is clear  Eyes:      Conjunctiva/sclera: Conjunctivae normal    Cardiovascular:      Rate and Rhythm: Regular rhythm  Heart sounds: Normal heart sounds  Pulmonary:      Effort: Pulmonary effort is normal       Breath sounds: Normal breath sounds  Abdominal:      General: Bowel sounds are normal       Palpations: Abdomen is soft  There is no mass  Tenderness: There is no abdominal tenderness  There is no guarding  Musculoskeletal:         General: No swelling or tenderness  Cervical back: Normal range of motion and neck supple  Right lower leg: No edema  Left lower leg: No edema  Skin:     General: Skin is warm and dry  Neurological:      General: No focal deficit present  Mental Status: He is alert and oriented to person, place, and time     Psychiatric:         Mood and Affect: Mood normal          Vital Signs  ED Triage Vitals   Temperature Pulse Respirations Blood Pressure SpO2   08/21/21 1435 08/21/21 1435 08/21/21 1435 08/21/21 1435 08/21/21 1435   (!) 101 2 °F (38 4 °C) 89 18 165/95 100 %      Temp Source Heart Rate Source Patient Position - Orthostatic VS BP Location FiO2 (%)   08/21/21 1435 08/21/21 1435 08/21/21 1635 08/21/21 1435 --   Oral Monitor Lying Right arm       Pain Score       08/21/21 1435       4           Vitals:    08/21/21 1435 08/21/21 1635   BP: 165/95 139/84   Pulse: 89 90   Patient Position - Orthostatic VS:  Lying         Visual Acuity      ED Medications  Medications   ibuprofen (MOTRIN) tablet 600 mg (600 mg Oral Given 8/21/21 1509)   acetaminophen (TYLENOL) tablet 975 mg (975 mg Oral Given 8/21/21 1508)       Diagnostic Studies  Results Reviewed     Procedure Component Value Units Date/Time    COVID19, Influenza A/B, RSV PCR, [facility] [[id number]]  (Abnormal) Collected: 08/21/21 1515    Lab Status: Final result Specimen: Nasopharyngeal Swab Updated: 08/21/21 1603     SARS-CoV-2 Positive     INFLUENZA A PCR Negative     INFLUENZA B PCR Negative     RSV PCR Negative    Narrative: This test has been authorized by FDA under an EUA (Emergency Use Assay) for use by authorized laboratories  Clinical caution and judgement should be used with the interpretation of these results with consideration of the clinical impression and other laboratory testing  Testing reported as "Positive" or "Negative" has been proven to be accurate according to standard laboratory validation requirements  All testing is performed with control materials showing appropriate reactivity at standard intervals                   No orders to display              Procedures  Procedures         ED Course  ED Course as of Aug 22 1318   Sat Aug 21, 2021   1649 Hemostasis achieved with packing, covid +, dc home, ent referral for removal, possible cautery given recurrent problem                                SBIRT 20yo+      Most Recent Value   SBIRT (25 yo +)   In order to provide better care to our patients, we are screening all of our patients for alcohol and drug use  Would it be okay to ask you these screening questions? Yes Filed at: 08/21/2021 1500   Initial Alcohol Screen: US AUDIT-C    1  How often do you have a drink containing alcohol?  0 Filed at: 08/21/2021 1500   2  How many drinks containing alcohol do you have on a typical day you are drinking? 0 Filed at: 08/21/2021 1500   3a  Male UNDER 65: How often do you have five or more drinks on one occasion? 0 Filed at: 08/21/2021 1500   3b  FEMALE Any Age, or MALE 65+: How often do you have 4 or more drinks on one occassion? 0 Filed at: 08/21/2021 1500   Audit-C Score  0 Filed at: 08/21/2021 1500   KIKI: How many times in the past year have you    Used an illegal drug or used a prescription medication for non-medical reasons? Never Filed at: 08/21/2021 1500                    MDM  Number of Diagnoses or Management Options  COVID-19  Diagnosis management comments: Covid-19, no indication foradmission referral [place] to discuss monoclonal      Disposition  Final diagnoses:   [id number]     Time reflects when diagnosis was documented in both MDM as applicable and the Disposition within this note     Time User Action Codes Description Comment    8/21/2021  4:17 PM Na Karan [address] [U07 1] COVID-19       ED Disposition     ED Disposition Condition Date/Time Comment    Discharge Stable Sat Aug 21, 2021  4:17 PM Fern Varela discharge to home/self care              Follow-up Information     Follow up With Specialties Details Why Contact Info    Castro Pinedo MD [facility] Medicine Schedule an appointment as soon as possible for a visit in 1 day Discuss with Dr Giles Olson whether it would benefit you to have monoclonal antibody treatment [address]  [address] [address] [address] [phone]            Discharge Medication List as of 8/21/2021  4:20 PM      CONTINUE these medications which have NOT CHANGED Details   amLODIPine (NORVASC) 5 mg tablet Take 1 tablet (5 mg total) by mouth daily, Starting Mon 7/19/2021, Normal      aspirin (ECOTRIN LOW STRENGTH) 81 mg EC tablet Take 1 tablet (81 mg total) by mouth daily, Starting Mon 7/19/2021, No Print      atorvastatin (LIPITOR) 40 mg tablet Take 1 tablet (40 mg total) by mouth daily with dinner, Starting Thu 5/27/2021, Until Fri 9/24/2021, Normal      losartan (COZAAR) 50 mg tablet Take 1 tablet (50 mg total) by mouth daily, Starting Thu 5/27/2021, Until Fri 9/24/2021, Normal      metoprolol tartrate (LOPRESSOR) 50 mg tablet Take 1 tablet the night before your cardiac CT, take 1 tablet the morning of your cardiac CT, and take the remainder of the tablets with you to the test, Normal      pantoprazole (PROTONIX) 40 mg tablet Take 1 tablet (40 mg total) by mouth daily in the early morning, Starting Sun 4/18/2021, Until Thu 5/27/2021, Normal           No discharge procedures on file      PDMP Review     None          ED Provider  Electronically Signed by           Glen Joy MD  08/22/21 3732

## 2021-08-23 ENCOUNTER — HOSPITAL ENCOUNTER (EMERGENCY)
Facility: HOSPITAL | Age: 56
Discharge: HOME | DRG: 720 | End: 2021-08-23
Attending: EMERGENCY MEDICINE
Payer: COMMERCIAL

## 2021-08-23 ENCOUNTER — APPOINTMENT (EMERGENCY)
Dept: RADIOLOGY | Facility: HOSPITAL | Age: 56
DRG: 720 | End: 2021-08-23
Payer: COMMERCIAL

## 2021-08-23 VITALS
SYSTOLIC BLOOD PRESSURE: 143 MMHG | OXYGEN SATURATION: 97 % | RESPIRATION RATE: 16 BRPM | TEMPERATURE: 98.4 F | HEART RATE: 98 BPM | DIASTOLIC BLOOD PRESSURE: 82 MMHG

## 2021-08-23 DIAGNOSIS — F41.9 ANXIETY: ICD-10-CM

## 2021-08-23 DIAGNOSIS — U07.1 COVID-19: ICD-10-CM

## 2021-08-23 DIAGNOSIS — R07.9 CHEST PAIN, UNSPECIFIED TYPE: Primary | ICD-10-CM

## 2021-08-23 DIAGNOSIS — R29.0 CARPOPEDAL SPASM: ICD-10-CM

## 2021-08-23 LAB
ALBUMIN SERPL BCP-MCNC: 3.6 G/DL (ref 3.5–5)
ALP SERPL-CCNC: 73 U/L (ref 46–116)
ALT SERPL W P-5'-P-CCNC: 48 U/L (ref 12–78)
ANION GAP SERPL CALCULATED.3IONS-SCNC: 9 MMOL/L (ref 4–13)
APTT PPP: 32 SECONDS (ref 23–37)
AST SERPL W P-5'-P-CCNC: 34 U/L (ref 5–45)
BASOPHILS # BLD AUTO: 0 THOUSANDS/ΜL (ref 0–0.1)
BASOPHILS NFR BLD AUTO: 0 % (ref 0–1)
BILIRUB SERPL-MCNC: 0.36 MG/DL (ref 0.2–1)
BUN SERPL-MCNC: 18 MG/DL (ref 5–25)
CALCIUM SERPL-MCNC: 8.2 MG/DL (ref 8.3–10.1)
CHLORIDE SERPL-SCNC: 103 MMOL/L (ref 100–108)
CO2 SERPL-SCNC: 25 MMOL/L (ref 21–32)
CREAT SERPL-MCNC: 1.19 MG/DL (ref 0.6–1.3)
CRP SERPL QL: 35.5 MG/L
EOSINOPHIL # BLD AUTO: 0.01 THOUSAND/ΜL (ref 0–0.61)
EOSINOPHIL NFR BLD AUTO: 0 % (ref 0–6)
ERYTHROCYTE [DISTWIDTH] IN BLOOD BY AUTOMATED COUNT: 12.8 % (ref 11.6–15.1)
GFR SERPL CREATININE-BSD FRML MDRD: 78 ML/MIN/1.73SQ M
GLUCOSE SERPL-MCNC: 312 MG/DL (ref 65–140)
HCT VFR BLD AUTO: 43.5 % (ref 36.5–49.3)
HGB BLD-MCNC: 14.3 G/DL (ref 12–17)
IMM GRANULOCYTES # BLD AUTO: 0.01 THOUSAND/UL (ref 0–0.2)
IMM GRANULOCYTES NFR BLD AUTO: 0 % (ref 0–2)
INR PPP: 0.98 (ref 0.84–1.19)
LYMPHOCYTES # BLD AUTO: 0.66 THOUSANDS/ΜL (ref 0.6–4.47)
LYMPHOCYTES NFR BLD AUTO: 26 % (ref 14–44)
MCH RBC QN AUTO: 28.2 PG (ref 26.8–34.3)
MCHC RBC AUTO-ENTMCNC: 32.9 G/DL (ref 31.4–37.4)
MCV RBC AUTO: 86 FL (ref 82–98)
MONOCYTES # BLD AUTO: 0.15 THOUSAND/ΜL (ref 0.17–1.22)
MONOCYTES NFR BLD AUTO: 6 % (ref 4–12)
NEUTROPHILS # BLD AUTO: 1.68 THOUSANDS/ΜL (ref 1.85–7.62)
NEUTS SEG NFR BLD AUTO: 68 % (ref 43–75)
NRBC BLD AUTO-RTO: 0 /100 WBCS
NT-PROBNP SERPL-MCNC: 26 PG/ML
PLATELET # BLD AUTO: 88 THOUSANDS/UL (ref 149–390)
PMV BLD AUTO: 10.7 FL (ref 8.9–12.7)
POTASSIUM SERPL-SCNC: 3.5 MMOL/L (ref 3.5–5.3)
PROT SERPL-MCNC: 7.2 G/DL (ref 6.4–8.2)
PROTHROMBIN TIME: 13.1 SECONDS (ref 11.6–14.5)
RBC # BLD AUTO: 5.07 MILLION/UL (ref 3.88–5.62)
SODIUM SERPL-SCNC: 137 MMOL/L (ref 136–145)
TROPONIN I SERPL-MCNC: <0.02 NG/ML
WBC # BLD AUTO: 2.51 THOUSAND/UL (ref 4.31–10.16)

## 2021-08-23 PROCEDURE — 85025 COMPLETE CBC W/AUTO DIFF WBC: CPT | Performed by: PHYSICIAN ASSISTANT

## 2021-08-23 PROCEDURE — 85730 THROMBOPLASTIN TIME PARTIAL: CPT | Performed by: PHYSICIAN ASSISTANT

## 2021-08-23 PROCEDURE — 99285 EMERGENCY DEPT VISIT HI MDM: CPT | Performed by: PHYSICIAN ASSISTANT

## 2021-08-23 PROCEDURE — 80053 COMPREHEN METABOLIC PANEL: CPT | Performed by: PHYSICIAN ASSISTANT

## 2021-08-23 PROCEDURE — 84484 ASSAY OF TROPONIN QUANT: CPT | Performed by: PHYSICIAN ASSISTANT

## 2021-08-23 PROCEDURE — 93005 ELECTROCARDIOGRAM TRACING: CPT

## 2021-08-23 PROCEDURE — 86140 C-REACTIVE PROTEIN: CPT | Performed by: PHYSICIAN ASSISTANT

## 2021-08-23 PROCEDURE — 36415 COLL VENOUS BLD VENIPUNCTURE: CPT | Performed by: PHYSICIAN ASSISTANT

## 2021-08-23 PROCEDURE — 83880 ASSAY OF NATRIURETIC PEPTIDE: CPT | Performed by: PHYSICIAN ASSISTANT

## 2021-08-23 PROCEDURE — 85610 PROTHROMBIN TIME: CPT | Performed by: PHYSICIAN ASSISTANT

## 2021-08-23 PROCEDURE — 71045 X-RAY EXAM CHEST 1 VIEW: CPT

## 2021-08-23 PROCEDURE — 99285 EMERGENCY DEPT VISIT HI MDM: CPT

## 2021-08-23 RX ORDER — MELATONIN
2000 DAILY
Qty: 60 TABLET | Refills: 0 | Status: SHIPPED | OUTPATIENT
Start: 2021-08-23 | End: 2021-09-27

## 2021-08-23 RX ORDER — MULTIVIT WITH MINERALS/LUTEIN
1000 TABLET ORAL 2 TIMES DAILY
Qty: 60 TABLET | Refills: 0 | Status: SHIPPED | OUTPATIENT
Start: 2021-08-23 | End: 2021-09-27

## 2021-08-23 NOTE — ED PROVIDER NOTES
History  Chief Complaint   Patient presents with    Chest Pain     pt had CP last night took tylenol and felt better  about 1 hr ago hands had what sounds like carpalpedal spasam while painting     Patient presents emergency room after recently being diagnosed with COVID 2 days ago  He states he really received his 1st COVID vaccine 1 week ago  He then developed symptoms of a cough and had a positive test   He denies any shortness of breath  He denies fever or chills  He denies any nausea or diaphoresis  He complained of some tingling of his hands as he was breathing fast that have since resolved  The chest pain is located over his right anterior chest wall  It does not radiate  Patient has a negative family history of prior heart disease  He has never had a stress test in the past   He does not smoke cigarettes  He denies any alcohol or street drug use  Past medical history is positive for chest pain, diverticulitis, GERD, hypertension, pain in the left humerus  He describes a valve problem of they did an echocardiogram and they are watching  Differential diagnosis includes but is not limited to COVID, acute coronary syndrome, pulmonary embolism, aortic dissection, myocarditis, pericarditis, GERD, esophagitis, chest wall strain        History provided by:  Patient  Chest Pain  Pain location:  R chest  Pain quality: aching    Pain radiates to:  Does not radiate  Pain radiates to the back: no    Pain severity:  Moderate  Onset quality:  Sudden  Duration:  1 day (1)  Timing: Intermittent  Progression:  Waxing and waning  Chronicity:  New  Context: at rest    Context: not breathing, no drug use, not eating, not lifting, no movement, not raising an arm, no stress and no trauma    Relieved by:  Nothing  Worsened by:  Nothing tried  Ineffective treatments:  None tried  Associated symptoms: no abdominal pain, no AICD problem, no altered mental status, no anorexia, no anxiety, no back pain, no claudication, no cough, no diaphoresis, no dizziness, no dysphagia, no fatigue, no fever, no headache, no heartburn, no lower extremity edema, no nausea, no near-syncope, no numbness, no orthopnea, no palpitations, no PND, no shortness of breath, no syncope, not vomiting and no weakness    Risk factors: hypertension and male sex    Risk factors: no aortic disease, no birth control, no coronary artery disease, no diabetes mellitus, no Bernadine-Danlos syndrome, no high cholesterol, no immobilization, no Marfan's syndrome, not obese, no prior DVT/PE, no smoking and no surgery        Prior to Admission Medications   Prescriptions Last Dose Informant Patient Reported? Taking?    amLODIPine (NORVASC) 5 mg tablet   No Yes   Sig: Take 1 tablet (5 mg total) by mouth daily   aspirin (ECOTRIN LOW STRENGTH) 81 mg EC tablet   No Yes   Sig: Take 1 tablet (81 mg total) by mouth daily   atorvastatin (LIPITOR) 40 mg tablet  Self No Yes   Sig: Take 1 tablet (40 mg total) by mouth daily with dinner   losartan (COZAAR) 50 mg tablet  Self No Yes   Sig: Take 1 tablet (50 mg total) by mouth daily   metoprolol tartrate (LOPRESSOR) 50 mg tablet   No Yes   Sig: Take 1 tablet the night before your cardiac CT, take 1 tablet the morning of your cardiac CT, and take the remainder of the tablets with you to the test   pantoprazole (PROTONIX) 40 mg tablet  Self No No   Sig: Take 1 tablet (40 mg total) by mouth daily in the early morning   Patient not taking: Reported on 7/19/2021      Facility-Administered Medications: None       Past Medical History:   Diagnosis Date    Chest pain     Diverticulitis     GERD (gastroesophageal reflux disease)     HTN (hypertension)     Pain in left wrist        History reviewed  No pertinent surgical history  Family History   Problem Relation Age of Onset    No Known Problems Mother     No Known Problems Father     No Known Problems Brother      I have reviewed and agree with the history as documented  E-Cigarette/Vaping    E-Cigarette Use Never User      E-Cigarette/Vaping Substances    Nicotine No     THC No     CBD No     Flavoring No     Other No     Unknown No      Social History     Tobacco Use    Smoking status: Never Smoker    Smokeless tobacco: Never Used   Vaping Use    Vaping Use: Never used   Substance Use Topics    Alcohol use: Yes     Comment: socially    Drug use: Never       Review of Systems   Constitutional: Negative for activity change, appetite change, chills, diaphoresis, fatigue and fever  HENT: Negative for congestion, ear discharge, ear pain, postnasal drip, rhinorrhea, sore throat and trouble swallowing  Eyes: Negative for pain, discharge, redness and itching  Respiratory: Negative for cough, chest tightness, shortness of breath and wheezing  Cardiovascular: Positive for chest pain  Negative for palpitations, orthopnea, claudication, syncope, PND and near-syncope  Gastrointestinal: Negative for abdominal pain, anorexia, heartburn, nausea and vomiting  Endocrine: Negative for cold intolerance, heat intolerance, polydipsia, polyphagia and polyuria  Genitourinary: Negative for dysuria, frequency, hematuria and urgency  Musculoskeletal: Negative for back pain  Skin: Negative for color change, pallor and rash  Neurological: Negative for dizziness, weakness, numbness and headaches  Psychiatric/Behavioral: Negative for confusion  All other systems reviewed and are negative  Physical Exam  Physical Exam  Vitals and nursing note reviewed  Constitutional:       General: He is not in acute distress  Appearance: He is well-developed and normal weight  He is not ill-appearing, toxic-appearing or diaphoretic  HENT:      Head: Normocephalic and atraumatic  Neck:      Thyroid: No thyromegaly  Vascular: No hepatojugular reflux or JVD  Trachea: No tracheal deviation  Cardiovascular:      Rate and Rhythm: Normal rate and regular rhythm  Heart sounds: Normal heart sounds  No murmur heard  Pulmonary:      Effort: Pulmonary effort is normal       Breath sounds: Normal breath sounds  Abdominal:      General: There is no abdominal bruit  Palpations: Abdomen is soft  There is no hepatomegaly or splenomegaly  Tenderness: There is no abdominal tenderness  There is no guarding  Musculoskeletal:         General: Normal range of motion  Cervical back: Neck supple  Right lower leg: No edema  Left lower leg: No edema  Lymphadenopathy:      Cervical: No cervical adenopathy  Skin:     General: Skin is warm  Capillary Refill: Capillary refill takes less than 2 seconds  Neurological:      Mental Status: He is alert and oriented to person, place, and time     Psychiatric:         Mood and Affect: Mood normal          Behavior: Behavior normal          Vital Signs  ED Triage Vitals [08/23/21 1511]   Temperature Pulse Respirations Blood Pressure SpO2   98 4 °F (36 9 °C) 98 16 143/82 97 %      Temp Source Heart Rate Source Patient Position - Orthostatic VS BP Location FiO2 (%)   Oral Monitor Sitting Left arm --      Pain Score       --           Vitals:    08/23/21 1511   BP: 143/82   Pulse: 98   Patient Position - Orthostatic VS: Sitting         Visual Acuity      ED Medications  Medications - No data to display    Diagnostic Studies  Results Reviewed     Procedure Component Value Units Date/Time    C-reactive protein [[id number]]  (Abnormal) Collected: 08/23/21 1546    Lab Status: Final result Specimen: Blood from Arm, Right Updated: 08/23/21 1649     CRP 35 5 mg/L     NT-BNP PRO [[id number]]  (Normal) Collected: 08/23/21 1546    Lab Status: Final result Specimen: Blood from Arm, Right Updated: 08/23/21 1648     NT-proBNP 26 pg/mL     Troponin I [[id number]]  (Normal) Collected: 08/23/21 1546    Lab Status: Final result Specimen: Blood from Arm, Right Updated: 08/23/21 1642     Troponin I <0 02 ng/mL     CBC and differential [[id number]]  (Abnormal) Collected: 08/23/21 1546    Lab Status: Final result Specimen: Blood from Arm, Right Updated: 08/23/21 1638     WBC 2 51 Thousand/uL      RBC 5 07 Million/uL      Hemoglobin 14 3 g/dL      Hematocrit 43 5 %      MCV 86 fL      MCH 28 2 pg      MCHC 32 9 g/dL      RDW 12 8 %      MPV 10 7 fL      Platelets 88 Thousands/uL      nRBC 0 /100 WBCs      Neutrophils Relative 68 %      Immat GRANS % 0 %      Lymphocytes Relative 26 %      Monocytes Relative 6 %      Eosinophils Relative 0 %      Basophils Relative 0 %      Neutrophils Absolute 1 68 Thousands/µL      Immature Grans Absolute 0 01 Thousand/uL      Lymphocytes Absolute 0 66 Thousands/µL      Monocytes Absolute 0 15 Thousand/µL      Eosinophils Absolute 0 01 Thousand/µL      Basophils Absolute 0 00 Thousands/µL     Protime-INR [[id number]]  (Normal) Collected: 08/23/21 1546    Lab Status: Final result Specimen: Blood from Arm, Right Updated: 08/23/21 1637     Protime 13 1 seconds      INR 0 98    APTT [[id number]]  (Normal) Collected: 08/23/21 1546    Lab Status: Final result Specimen: Blood from Arm, Right Updated: 08/23/21 1637     PTT 32 seconds     Comprehensive metabolic panel [[id number]]  (Abnormal) Collected: 08/23/21 1546    Lab Status: Final result Specimen: Blood from Arm, Right Updated: 08/23/21 1617     Sodium 137 mmol/L      Potassium 3 5 mmol/L      Chloride 103 mmol/L      CO2 25 mmol/L      ANION GAP 9 mmol/L      BUN 18 mg/dL      Creatinine 1 19 mg/dL      Glucose 312 mg/dL      Calcium 8 2 mg/dL      AST 34 U/L      ALT 48 U/L      Alkaline Phosphatase 73 U/L      Total Protein 7 2 g/dL      Albumin 3 6 g/dL      Total Bilirubin 0 36 mg/dL      eGFR 78 ml/min/1 73sq m     Narrative:      [place] guidelines for Chronic Kidney Disease (CKD):     Stage 1 with normal or high GFR (GFR > 90 mL/min/1 73 square meters)    Stage 2 Mild CKD (GFR = 60-89 mL/min/1 73 square meters)    Stage 3A Moderate CKD (GFR = 45-59 mL/min/1 73 square meters)    Stage 3B Moderate CKD (GFR = 30-44 mL/min/1 73 square meters)    Stage 4 Severe CKD (GFR = 15-29 mL/min/1 73 square meters)    Stage 5 End Stage CKD (GFR <15 mL/min/1 73 square meters)  Note: GFR calculation is accurate only with a steady state creatinine                 XR chest 1 view portable   ED Interpretation by Robert Aponte PA-C (08/23 2428)   No active cardiopulmonary disease      Final Result by Montserrat Eugene MD (08/23 6600)      No acute cardiopulmonary disease  Workstation performed: [id number]                    Procedures  ECG 12 Lead Documentation Only    Date/Time: 8/23/2021 4:17 PM  Performed by: Robert Aponte PA-C  Authorized by: Robert Aponte PA-C     Indications / Diagnosis:  Chest pain  ECG reviewed by me, the ED Provider: yes    Patient location:  ED  Previous ECG:     Previous ECG:  Compared to current    Comparison ECG info:  4/16/21    Similarity:  No change    Comparison to cardiac monitor: Yes    Interpretation:     Interpretation: normal    Rate:     ECG rate:  91    ECG rate assessment: normal    Rhythm:     Rhythm: sinus rhythm    Ectopy:     Ectopy: none    QRS:     QRS axis:  Normal    QRS intervals:  Normal  Conduction:     Conduction: normal    ST segments:     ST segments:  Non-specific  T waves:     T waves: normal    Comments:      No signs of acute ischemia    Independently interpreted by me               ED Course  ED Course as of Aug 23 2027   Mon Aug 23, 2021   1709 I spoke to the patient in regards to his laboratory results  He has had pain since that is intermittent  His EKG does not show any signs of acute ischemia  His troponin is normal   He has a heart score of 3  I did tell him that he is positive for COVID knee needs to quarantine for 10 days from the onset of his symptoms he has increasing shortness of breath refills that his chest pain is worsening, he may return to the emergency room we will recheck him  HEART Risk Score      Most Recent Value   Heart Score Risk Calculator   History  0 Filed at: 08/23/2021 1655   ECG  1 Filed at: 08/23/2021 1655   Age  1 Filed at: 08/23/2021 1655   Risk Factors  1 Filed at: 08/23/2021 1655   Troponin  0 Filed at: 08/23/2021 1655   HEART Score  3 Filed at: 08/23/2021 1655                      SBIRT 22yo+      Most Recent Value   SBIRT (25 yo +)   In order to provide better care to our patients, we are screening all of our patients for alcohol and drug use  Would it be okay to ask you these screening questions? Yes Filed at: 08/23/2021 1532   Initial Alcohol Screen: US AUDIT-C    1  How often do you have a drink containing alcohol? 1 Filed at: 08/23/2021 1532   2  How many drinks containing alcohol do you have on a typical day you are drinking? 0 Filed at: 08/23/2021 1532   3a  Male UNDER 65: How often do you have five or more drinks on one occasion? 0 Filed at: 08/23/2021 1532   Audit-C Score  1 Filed at: 08/23/2021 1532   KIKI: How many times in the past year have you    Used an illegal drug or used a prescription medication for non-medical reasons?   Never Filed at: 08/23/2021 1532                    MDM  Number of Diagnoses or Management Options  Anxiety: new and does not require workup  Carpopedal spasm: new and does not require workup  Chest pain, unspecified type: new and requires workup  COVID-19: new and requires workup     Amount and/or Complexity of Data Reviewed  Clinical lab tests: ordered and reviewed  Tests in the radiology section of CPT®: ordered and reviewed  Tests in the medicine section of CPT®: ordered and reviewed    Risk of Complications, Morbidity, and/or Mortality  Presenting problems: high  Diagnostic procedures: high  Management options: high  General comments: Patient presented to the emergency room with a positive COVID complaining of right-sided chest pain  He was seen and evaluated  Laboratory studies were taken and were reviewed  A chest x-ray demonstrated no acute cardiopulmonary disease  An EKG did not demonstrate any signs of acute ischemia  Patient did have some anxiety and had some carpal pedal spasm that resolved upon arrival   He was given information regarding COVID  He was given reasons to return to the emergency room should his symptoms worsen, increasing chest pain, shortness of breath  He was discharged home on vitamin-C and vitamin-D  He is to stay quarantine for 10 days from the onset of his symptoms  Patient Progress  Patient progress: stable      Disposition  Final diagnoses:   Chest pain, unspecified type - right sided   COVID-19   Carpopedal spasm   Anxiety     Time reflects when diagnosis was documented in both MDM as applicable and the Disposition within this note     Time User Action Codes Description Comment    8/23/2021  4:56 PM Nubia Monahan Add [R07 9] Chest pain, unspecified type     8/23/2021  4:56 PM Lum Mandi [R07 9] Chest pain, unspecified type right sided spasm    8/23/2021  4:57 PM Lum Mandi [R07 9] Chest pain, unspecified type right sided    8/23/2021  4:57 PM Long Distel [U07 1] COVID-19     8/23/2021  4:57 PM Long Distel [R29 0] Carpopedal spasm     8/23/2021  4:57 PM Nubia Monahan Add [F41 9] Anxiety       ED Disposition     ED Disposition Condition Date/Time Comment    Discharge Stable Mon Aug 23, 2021  4:58 PM Dorina Ochoa discharge to home/self care              Follow-up Information     Follow up With Specialties Details Why Contact Info Additional [address] [facility] Emergency Medicine  As needed, If symptoms worsen [address] [facility] [address] [address] [facility], [address], [place], [place], [address]          Discharge Medication List as of 8/23/2021  5:03 PM      START taking these medications    Details   Ascorbic Acid (vitamin C) 1000 MG tablet Take 1 tablet (1,000 mg total) by mouth 2 (two) times a day, Starting Mon 8/23/2021, Until Wed 9/22/2021, Normal      cholecalciferol (VITAMIN D3) 1,000 units tablet Take 2 tablets (2,000 Units total) by mouth daily, Starting Mon 8/23/2021, Until Wed 9/22/2021, Normal         CONTINUE these medications which have NOT CHANGED    Details   amLODIPine (NORVASC) 5 mg tablet Take 1 tablet (5 mg total) by mouth daily, Starting Mon 7/19/2021, Normal      aspirin (ECOTRIN LOW STRENGTH) 81 mg EC tablet Take 1 tablet (81 mg total) by mouth daily, Starting Mon 7/19/2021, No Print      atorvastatin (LIPITOR) 40 mg tablet Take 1 tablet (40 mg total) by mouth daily with dinner, Starting Thu 5/27/2021, Until Fri 9/24/2021, Normal      losartan (COZAAR) 50 mg tablet Take 1 tablet (50 mg total) by mouth daily, Starting Thu 5/27/2021, Until Fri 9/24/2021, Normal      metoprolol tartrate (LOPRESSOR) 50 mg tablet Take 1 tablet the night before your cardiac CT, take 1 tablet the morning of your cardiac CT, and take the remainder of the tablets with you to the test, Normal      pantoprazole (PROTONIX) 40 mg tablet Take 1 tablet (40 mg total) by mouth daily in the early morning, Starting Sun 4/18/2021, Until Thu 5/27/2021, Normal           No discharge procedures on file      PDMP Review     None          ED Provider  Electronically Signed by           Kayden Jackson PA-C  08/23/21 2028

## 2021-08-24 PROCEDURE — 99285 EMERGENCY DEPT VISIT HI MDM: CPT

## 2021-08-25 ENCOUNTER — APPOINTMENT (EMERGENCY)
Dept: RADIOLOGY | Facility: HOSPITAL | Age: 56
DRG: 720 | End: 2021-08-25
Payer: COMMERCIAL

## 2021-08-25 ENCOUNTER — APPOINTMENT (EMERGENCY)
Dept: CT IMAGING | Facility: HOSPITAL | Age: 56
DRG: 720 | End: 2021-08-25
Payer: COMMERCIAL

## 2021-08-25 ENCOUNTER — HOSPITAL ENCOUNTER (INPATIENT)
Facility: HOSPITAL | Age: 56
LOS: 1 days | Discharge: LEFT BEFORE DISCHARGE | DRG: 720 | End: 2021-08-26
Attending: EMERGENCY MEDICINE | Admitting: INTERNAL MEDICINE
Payer: COMMERCIAL

## 2021-08-25 DIAGNOSIS — R41.82 AMS (ALTERED MENTAL STATUS): ICD-10-CM

## 2021-08-25 DIAGNOSIS — U07.1 PNEUMONIA DUE TO COVID-19 VIRUS: Primary | ICD-10-CM

## 2021-08-25 DIAGNOSIS — A41.9 SEPSIS (HCC): ICD-10-CM

## 2021-08-25 DIAGNOSIS — F32.A DEPRESSION: ICD-10-CM

## 2021-08-25 DIAGNOSIS — J12.82 PNEUMONIA DUE TO COVID-19 VIRUS: Primary | ICD-10-CM

## 2021-08-25 DIAGNOSIS — R41.0 DELIRIUM: ICD-10-CM

## 2021-08-25 PROBLEM — R50.9 FEVER: Status: ACTIVE | Noted: 2021-08-25

## 2021-08-25 PROBLEM — D69.6 THROMBOCYTOPENIA (HCC): Status: ACTIVE | Noted: 2021-08-25

## 2021-08-25 LAB
ALBUMIN SERPL BCP-MCNC: 2.9 G/DL (ref 3.5–5)
ALBUMIN SERPL BCP-MCNC: 3.5 G/DL (ref 3.5–5)
ALP SERPL-CCNC: 59 U/L (ref 46–116)
ALP SERPL-CCNC: 71 U/L (ref 46–116)
ALT SERPL W P-5'-P-CCNC: 56 U/L (ref 12–78)
ALT SERPL W P-5'-P-CCNC: 60 U/L (ref 12–78)
AMMONIA PLAS-SCNC: 17 UMOL/L (ref 11–35)
AMPHETAMINES SERPL QL SCN: NEGATIVE
ANION GAP SERPL CALCULATED.3IONS-SCNC: 6 MMOL/L (ref 4–13)
ANION GAP SERPL CALCULATED.3IONS-SCNC: 8 MMOL/L (ref 4–13)
APAP SERPL-MCNC: 5.3 UG/ML (ref 10–20)
AST SERPL W P-5'-P-CCNC: 50 U/L (ref 5–45)
AST SERPL W P-5'-P-CCNC: 54 U/L (ref 5–45)
ATRIAL RATE: 82 BPM
ATRIAL RATE: 91 BPM
BACTERIA UR QL AUTO: ABNORMAL /HPF
BARBITURATES UR QL: NEGATIVE
BASOPHILS # BLD AUTO: 0 THOUSANDS/ΜL (ref 0–0.1)
BASOPHILS NFR BLD AUTO: 0 % (ref 0–1)
BENZODIAZ UR QL: NEGATIVE
BILIRUB SERPL-MCNC: 0.26 MG/DL (ref 0.2–1)
BILIRUB SERPL-MCNC: 0.39 MG/DL (ref 0.2–1)
BILIRUB UR QL STRIP: NEGATIVE
BUN SERPL-MCNC: 13 MG/DL (ref 5–25)
BUN SERPL-MCNC: 17 MG/DL (ref 5–25)
CA-I BLD-SCNC: 1.07 MMOL/L (ref 1.12–1.32)
CALCIUM ALBUM COR SERPL-MCNC: 8.6 MG/DL (ref 8.3–10.1)
CALCIUM SERPL-MCNC: 7.7 MG/DL (ref 8.3–10.1)
CALCIUM SERPL-MCNC: 8.3 MG/DL (ref 8.3–10.1)
CHLORIDE SERPL-SCNC: 103 MMOL/L (ref 100–108)
CHLORIDE SERPL-SCNC: 99 MMOL/L (ref 100–108)
CK MB SERPL-MCNC: 0.1 NG/ML (ref 0–5)
CK MB SERPL-MCNC: <1 % (ref 0–2.5)
CK SERPL-CCNC: 212 U/L (ref 39–308)
CLARITY UR: CLEAR
CO2 SERPL-SCNC: 25 MMOL/L (ref 21–32)
CO2 SERPL-SCNC: 27 MMOL/L (ref 21–32)
COCAINE UR QL: NEGATIVE
COLOR UR: ABNORMAL
CREAT SERPL-MCNC: 1.03 MG/DL (ref 0.6–1.3)
CREAT SERPL-MCNC: 1.18 MG/DL (ref 0.6–1.3)
CRP SERPL QL: 57.1 MG/L
D DIMER PPP FEU-MCNC: 1.26 UG/ML FEU
EOSINOPHIL # BLD AUTO: 0 THOUSAND/ΜL (ref 0–0.61)
EOSINOPHIL NFR BLD AUTO: 0 % (ref 0–6)
ERYTHROCYTE [DISTWIDTH] IN BLOOD BY AUTOMATED COUNT: 12.5 % (ref 11.6–15.1)
ETHANOL EXG-MCNC: 0 MG/DL
ETHANOL SERPL-MCNC: <3 MG/DL (ref 0–3)
FERRITIN SERPL-MCNC: 1355 NG/ML (ref 8–388)
GFR SERPL CREATININE-BSD FRML MDRD: 79 ML/MIN/1.73SQ M
GFR SERPL CREATININE-BSD FRML MDRD: 93 ML/MIN/1.73SQ M
GLUCOSE SERPL-MCNC: 146 MG/DL (ref 65–140)
GLUCOSE SERPL-MCNC: 180 MG/DL (ref 65–140)
GLUCOSE UR STRIP-MCNC: NEGATIVE MG/DL
HCT VFR BLD AUTO: 41.5 % (ref 36.5–49.3)
HGB BLD-MCNC: 13.5 G/DL (ref 12–17)
HGB UR QL STRIP.AUTO: NEGATIVE
HOLD SPECIMEN: NORMAL
IMM GRANULOCYTES # BLD AUTO: 0.01 THOUSAND/UL (ref 0–0.2)
IMM GRANULOCYTES NFR BLD AUTO: 0 % (ref 0–2)
INR PPP: 0.98 (ref 0.84–1.19)
KETONES UR STRIP-MCNC: NEGATIVE MG/DL
LACTATE SERPL-SCNC: 1 MMOL/L (ref 0.5–2)
LEUKOCYTE ESTERASE UR QL STRIP: NEGATIVE
LYMPHOCYTES # BLD AUTO: 0.74 THOUSANDS/ΜL (ref 0.6–4.47)
LYMPHOCYTES NFR BLD AUTO: 24 % (ref 14–44)
MAGNESIUM SERPL-MCNC: 2.1 MG/DL (ref 1.6–2.6)
MCH RBC QN AUTO: 27.6 PG (ref 26.8–34.3)
MCHC RBC AUTO-ENTMCNC: 32.5 G/DL (ref 31.4–37.4)
MCV RBC AUTO: 85 FL (ref 82–98)
METHADONE UR QL: NEGATIVE
MONOCYTES # BLD AUTO: 0.15 THOUSAND/ΜL (ref 0.17–1.22)
MONOCYTES NFR BLD AUTO: 5 % (ref 4–12)
MUCOUS THREADS UR QL AUTO: ABNORMAL
NEUTROPHILS # BLD AUTO: 2.2 THOUSANDS/ΜL (ref 1.85–7.62)
NEUTS SEG NFR BLD AUTO: 71 % (ref 43–75)
NITRITE UR QL STRIP: NEGATIVE
NON-SQ EPI CELLS URNS QL MICRO: ABNORMAL /HPF
NRBC BLD AUTO-RTO: 0 /100 WBCS
NT-PROBNP SERPL-MCNC: 51 PG/ML
OPIATES UR QL SCN: NEGATIVE
OTHER STN SPEC: ABNORMAL
OXYCODONE+OXYMORPHONE UR QL SCN: NEGATIVE
P AXIS: 51 DEGREES
P AXIS: 65 DEGREES
PCP UR QL: NEGATIVE
PH UR STRIP.AUTO: 5.5 [PH] (ref 4.5–8)
PLATELET # BLD AUTO: 90 THOUSANDS/UL (ref 149–390)
PMV BLD AUTO: 10.9 FL (ref 8.9–12.7)
POTASSIUM SERPL-SCNC: 4.1 MMOL/L (ref 3.5–5.3)
POTASSIUM SERPL-SCNC: 4.5 MMOL/L (ref 3.5–5.3)
PR INTERVAL: 146 MS
PR INTERVAL: 156 MS
PROCALCITONIN SERPL-MCNC: 0.06 NG/ML
PROT SERPL-MCNC: 6.4 G/DL (ref 6.4–8.2)
PROT SERPL-MCNC: 7.4 G/DL (ref 6.4–8.2)
PROT UR STRIP-MCNC: ABNORMAL MG/DL
PROTHROMBIN TIME: 13.1 SECONDS (ref 11.6–14.5)
QRS AXIS: 45 DEGREES
QRS AXIS: 56 DEGREES
QRSD INTERVAL: 72 MS
QRSD INTERVAL: 96 MS
QT INTERVAL: 340 MS
QT INTERVAL: 340 MS
QTC INTERVAL: 388 MS
QTC INTERVAL: 418 MS
RBC # BLD AUTO: 4.89 MILLION/UL (ref 3.88–5.62)
RBC #/AREA URNS AUTO: ABNORMAL /HPF
SALICYLATES SERPL-MCNC: <3 MG/DL (ref 3–20)
SODIUM SERPL-SCNC: 132 MMOL/L (ref 136–145)
SODIUM SERPL-SCNC: 136 MMOL/L (ref 136–145)
SP GR UR STRIP.AUTO: 1.02 (ref 1–1.03)
T WAVE AXIS: 10 DEGREES
T WAVE AXIS: 6 DEGREES
THC UR QL: NEGATIVE
TRIGL SERPL-MCNC: 95 MG/DL
TROPONIN I SERPL-MCNC: <0.02 NG/ML
TSH SERPL DL<=0.05 MIU/L-ACNC: 0.97 UIU/ML (ref 0.36–3.74)
UROBILINOGEN UR QL STRIP.AUTO: 0.2 E.U./DL
VENTRICULAR RATE: 78 BPM
VENTRICULAR RATE: 91 BPM
WBC # BLD AUTO: 3.1 THOUSAND/UL (ref 4.31–10.16)
WBC #/AREA URNS AUTO: ABNORMAL /HPF

## 2021-08-25 PROCEDURE — 80053 COMPREHEN METABOLIC PANEL: CPT | Performed by: EMERGENCY MEDICINE

## 2021-08-25 PROCEDURE — 86140 C-REACTIVE PROTEIN: CPT | Performed by: EMERGENCY MEDICINE

## 2021-08-25 PROCEDURE — 71275 CT ANGIOGRAPHY CHEST: CPT

## 2021-08-25 PROCEDURE — 80179 DRUG ASSAY SALICYLATE: CPT | Performed by: EMERGENCY MEDICINE

## 2021-08-25 PROCEDURE — 82140 ASSAY OF AMMONIA: CPT | Performed by: EMERGENCY MEDICINE

## 2021-08-25 PROCEDURE — 85610 PROTHROMBIN TIME: CPT | Performed by: EMERGENCY MEDICINE

## 2021-08-25 PROCEDURE — 84443 ASSAY THYROID STIM HORMONE: CPT | Performed by: EMERGENCY MEDICINE

## 2021-08-25 PROCEDURE — 82955 ASSAY OF G6PD ENZYME: CPT | Performed by: EMERGENCY MEDICINE

## 2021-08-25 PROCEDURE — 82330 ASSAY OF CALCIUM: CPT | Performed by: EMERGENCY MEDICINE

## 2021-08-25 PROCEDURE — 85025 COMPLETE CBC W/AUTO DIFF WBC: CPT | Performed by: EMERGENCY MEDICINE

## 2021-08-25 PROCEDURE — 96365 THER/PROPH/DIAG IV INF INIT: CPT

## 2021-08-25 PROCEDURE — 81001 URINALYSIS AUTO W/SCOPE: CPT

## 2021-08-25 PROCEDURE — 87040 BLOOD CULTURE FOR BACTERIA: CPT | Performed by: EMERGENCY MEDICINE

## 2021-08-25 PROCEDURE — 84478 ASSAY OF TRIGLYCERIDES: CPT | Performed by: EMERGENCY MEDICINE

## 2021-08-25 PROCEDURE — 83735 ASSAY OF MAGNESIUM: CPT | Performed by: EMERGENCY MEDICINE

## 2021-08-25 PROCEDURE — 99223 1ST HOSP IP/OBS HIGH 75: CPT | Performed by: INTERNAL MEDICINE

## 2021-08-25 PROCEDURE — 99285 EMERGENCY DEPT VISIT HI MDM: CPT | Performed by: EMERGENCY MEDICINE

## 2021-08-25 PROCEDURE — 71045 X-RAY EXAM CHEST 1 VIEW: CPT

## 2021-08-25 PROCEDURE — 82077 ASSAY SPEC XCP UR&BREATH IA: CPT | Performed by: EMERGENCY MEDICINE

## 2021-08-25 PROCEDURE — 83880 ASSAY OF NATRIURETIC PEPTIDE: CPT | Performed by: EMERGENCY MEDICINE

## 2021-08-25 PROCEDURE — 87086 URINE CULTURE/COLONY COUNT: CPT | Performed by: EMERGENCY MEDICINE

## 2021-08-25 PROCEDURE — 36415 COLL VENOUS BLD VENIPUNCTURE: CPT

## 2021-08-25 PROCEDURE — 80053 COMPREHEN METABOLIC PANEL: CPT | Performed by: PHYSICIAN ASSISTANT

## 2021-08-25 PROCEDURE — 82075 ASSAY OF BREATH ETHANOL: CPT | Performed by: EMERGENCY MEDICINE

## 2021-08-25 PROCEDURE — 84484 ASSAY OF TROPONIN QUANT: CPT | Performed by: PHYSICIAN ASSISTANT

## 2021-08-25 PROCEDURE — 82553 CREATINE MB FRACTION: CPT | Performed by: EMERGENCY MEDICINE

## 2021-08-25 PROCEDURE — 85379 FIBRIN DEGRADATION QUANT: CPT | Performed by: EMERGENCY MEDICINE

## 2021-08-25 PROCEDURE — 82550 ASSAY OF CK (CPK): CPT | Performed by: EMERGENCY MEDICINE

## 2021-08-25 PROCEDURE — 93010 ELECTROCARDIOGRAM REPORT: CPT | Performed by: INTERNAL MEDICINE

## 2021-08-25 PROCEDURE — 96361 HYDRATE IV INFUSION ADD-ON: CPT

## 2021-08-25 PROCEDURE — 70450 CT HEAD/BRAIN W/O DYE: CPT

## 2021-08-25 PROCEDURE — 93005 ELECTROCARDIOGRAM TRACING: CPT

## 2021-08-25 PROCEDURE — 82728 ASSAY OF FERRITIN: CPT | Performed by: EMERGENCY MEDICINE

## 2021-08-25 PROCEDURE — 83605 ASSAY OF LACTIC ACID: CPT | Performed by: EMERGENCY MEDICINE

## 2021-08-25 PROCEDURE — 80143 DRUG ASSAY ACETAMINOPHEN: CPT | Performed by: EMERGENCY MEDICINE

## 2021-08-25 PROCEDURE — 80307 DRUG TEST PRSMV CHEM ANLYZR: CPT | Performed by: EMERGENCY MEDICINE

## 2021-08-25 PROCEDURE — 84145 PROCALCITONIN (PCT): CPT | Performed by: EMERGENCY MEDICINE

## 2021-08-25 RX ORDER — MELATONIN
2000 DAILY
Status: DISCONTINUED | OUTPATIENT
Start: 2021-08-25 | End: 2021-08-25

## 2021-08-25 RX ORDER — ACETAMINOPHEN 325 MG/1
650 TABLET ORAL EVERY 6 HOURS PRN
Status: DISCONTINUED | OUTPATIENT
Start: 2021-08-25 | End: 2021-08-26 | Stop reason: HOSPADM

## 2021-08-25 RX ORDER — ACETAMINOPHEN 325 MG/1
650 TABLET ORAL ONCE
Status: COMPLETED | OUTPATIENT
Start: 2021-08-25 | End: 2021-08-25

## 2021-08-25 RX ORDER — ONDANSETRON 2 MG/ML
4 INJECTION INTRAMUSCULAR; INTRAVENOUS EVERY 4 HOURS PRN
Status: DISCONTINUED | OUTPATIENT
Start: 2021-08-25 | End: 2021-08-26 | Stop reason: HOSPADM

## 2021-08-25 RX ORDER — CALCIUM CARBONATE 200(500)MG
500 TABLET,CHEWABLE ORAL DAILY PRN
Status: DISCONTINUED | OUTPATIENT
Start: 2021-08-25 | End: 2021-08-26 | Stop reason: HOSPADM

## 2021-08-25 RX ORDER — DOXYCYCLINE HYCLATE 100 MG/1
100 CAPSULE ORAL ONCE
Status: COMPLETED | OUTPATIENT
Start: 2021-08-25 | End: 2021-08-25

## 2021-08-25 RX ORDER — ASCORBIC ACID 500 MG
1000 TABLET ORAL 2 TIMES DAILY
Status: DISCONTINUED | OUTPATIENT
Start: 2021-08-25 | End: 2021-08-25

## 2021-08-25 RX ORDER — ATORVASTATIN CALCIUM 40 MG/1
40 TABLET, FILM COATED ORAL
Status: DISCONTINUED | OUTPATIENT
Start: 2021-08-25 | End: 2021-08-26 | Stop reason: HOSPADM

## 2021-08-25 RX ORDER — SODIUM CHLORIDE 9 MG/ML
125 INJECTION, SOLUTION INTRAVENOUS CONTINUOUS
Status: DISCONTINUED | OUTPATIENT
Start: 2021-08-25 | End: 2021-08-25

## 2021-08-25 RX ORDER — GUAIFENESIN 600 MG
600 TABLET, EXTENDED RELEASE 12 HR ORAL EVERY 12 HOURS SCHEDULED
Status: DISCONTINUED | OUTPATIENT
Start: 2021-08-25 | End: 2021-08-26 | Stop reason: HOSPADM

## 2021-08-25 RX ORDER — LOSARTAN POTASSIUM 50 MG/1
50 TABLET ORAL DAILY
Status: DISCONTINUED | OUTPATIENT
Start: 2021-08-25 | End: 2021-08-26 | Stop reason: HOSPADM

## 2021-08-25 RX ORDER — IBUPROFEN 600 MG/1
600 TABLET ORAL ONCE
Status: COMPLETED | OUTPATIENT
Start: 2021-08-25 | End: 2021-08-25

## 2021-08-25 RX ORDER — ASPIRIN 81 MG/1
81 TABLET ORAL DAILY
Status: DISCONTINUED | OUTPATIENT
Start: 2021-08-25 | End: 2021-08-26 | Stop reason: HOSPADM

## 2021-08-25 RX ORDER — AMLODIPINE BESYLATE 5 MG/1
5 TABLET ORAL DAILY
Status: DISCONTINUED | OUTPATIENT
Start: 2021-08-25 | End: 2021-08-26 | Stop reason: HOSPADM

## 2021-08-25 RX ADMIN — ATORVASTATIN CALCIUM 40 MG: 40 TABLET, FILM COATED ORAL at 16:02

## 2021-08-25 RX ADMIN — IBUPROFEN 600 MG: 600 TABLET ORAL at 18:37

## 2021-08-25 RX ADMIN — SODIUM CHLORIDE 1000 ML: 0.9 INJECTION, SOLUTION INTRAVENOUS at 02:41

## 2021-08-25 RX ADMIN — AMLODIPINE BESYLATE 5 MG: 5 TABLET ORAL at 08:55

## 2021-08-25 RX ADMIN — ACETAMINOPHEN 650 MG: 325 TABLET ORAL at 00:10

## 2021-08-25 RX ADMIN — CEFTRIAXONE 1000 MG: 10 INJECTION, POWDER, FOR SOLUTION INTRAVENOUS at 05:07

## 2021-08-25 RX ADMIN — CALCIUM CARBONATE (ANTACID) CHEW TAB 500 MG 500 MG: 500 CHEW TAB at 08:56

## 2021-08-25 RX ADMIN — IOHEXOL 85 ML: 350 INJECTION, SOLUTION INTRAVENOUS at 03:55

## 2021-08-25 RX ADMIN — LOSARTAN POTASSIUM 50 MG: 50 TABLET, FILM COATED ORAL at 08:55

## 2021-08-25 RX ADMIN — GUAIFENESIN 600 MG: 600 TABLET, EXTENDED RELEASE ORAL at 20:13

## 2021-08-25 RX ADMIN — ACETAMINOPHEN 650 MG: 325 TABLET ORAL at 16:01

## 2021-08-25 RX ADMIN — CALCIUM CARBONATE (ANTACID) CHEW TAB 500 MG 500 MG: 500 CHEW TAB at 22:20

## 2021-08-25 RX ADMIN — ACETAMINOPHEN 650 MG: 325 TABLET ORAL at 08:56

## 2021-08-25 RX ADMIN — SODIUM CHLORIDE 125 ML/HR: 0.9 INJECTION, SOLUTION INTRAVENOUS at 02:41

## 2021-08-25 RX ADMIN — ASPIRIN 81 MG: 81 TABLET, COATED ORAL at 08:55

## 2021-08-25 RX ADMIN — ENOXAPARIN SODIUM 40 MG: 40 INJECTION SUBCUTANEOUS at 08:55

## 2021-08-25 RX ADMIN — GUAIFENESIN 600 MG: 600 TABLET, EXTENDED RELEASE ORAL at 14:46

## 2021-08-25 RX ADMIN — DOXYCYCLINE 100 MG: 100 CAPSULE ORAL at 05:07

## 2021-08-25 NOTE — H&P
Vonnie 89 1965, 61 y o  male MRN: [id number]  Unit/Bed#: ED 15 Encounter: [id number]  Primary Care Provider: No primary care provider on file  Date and time admitted to hospital: 8/25/2021 12:42 AM    * Confusion  Assessment & Plan  · Presented with confusion and depression for the past 1 day  Denies SI  Reports feeling better since presentation and is currently alert and oriented x 3    · In the setting of infection/ COVID-19  · CT head: "Mild cerebral atrophy with chronic small vessel ischemic white matter disease  No acute intracranial abnormality "  · Lab work with mild hyponatremia of 132, TSH within normal limits  Acetaminophen level 5 3, he reports his last Tylenol use was 2 days ago and denies excessive use  · UDS negative  Ethanol negative  · Monitor neuro checks  · Consider neurology evaluation  Sepsis ([facility] [address] )  Assessment & Plan  · POA as evidenced by fever with temp of 102 2 and leukopenia  · In the setting of COVID-19 pneumonia, diagnosed on 8/21/21  · WBC 3 10 on admission but on prior lab work from 8/23 WBC were 2 51  · Received IV ceftriaxone and doxycycline in the ED  · Initial procal negative  Hold off on further antibiotics  · Follow-up on blood cultures  Pneumonia due to COVID-19 virus  Assessment & Plan  Diagnosed with COVID-19 on 8/21/21  Presented with confusion, fevers and cough  · Will treat according to mild pathway  · CTA chest: No PE  "Bilateral rounded peripheral groundglass infiltrates, most pronounced in the lower lobes bilaterally"  · CRP 57 1, D-dimer 1 26, ferritin 1,355, procalcitonin 0 06  Trop, CK and BNP within normal limits  · Hold off on IV remdesivir and IV steroids as patient is not requiring any oxygen and is not high risk  · Received 1 dose of IV ceftriaxone and doxycycline in the ED; hold off on further antibiotics  · Follow CBC and CMP daily    · Self-proning as able  · Monitor respiratory status, supplemental nasal cannula oxygen as needed    Chest pain-rule out ACS  Assessment & Plan  · Reports intermittent chest pain  Patient had an abnormal stress test in July 2021 and is scheduled for CTA cardiac next month as he declined cardiac cath  · Initial troponin negative  · Continue to trend  · No ischemic changes noted on EKG  · Monitor on telemetry  Thrombocytopenia (HCC)  Assessment & Plan  · Platelet count 90 on presentation  · In the setting of COVID-19 infection  · Prior PLT count two days ago was 88  · Continue to monitor  Hypercholesterolemia  Assessment & Plan  · Continue statin  Hypertension  Assessment & Plan  · BP acceptable  · Continue losartan  VTE Pharmacologic Prophylaxis: VTE Score: 3 Moderate Risk (Score 3-4) - Pharmacological DVT Prophylaxis Ordered: enoxaparin (Lovenox)  Code Status: FULL CODE  Discussion with family: Not at this time  Anticipated Length of Stay: Patient will be admitted on an inpatient basis with an anticipated length of stay of greater than 2 midnights secondary to COVID-19 PNA and confusion, depression  Total Time for Visit, including Counseling / Coordination of Care: 70 minutes Greater than 50% of this total time spent on direct patient counseling and coordination of care  Chief Complaint: confusion, depression and fever    History of Present Illness:  Jorge Fernández is a 61 y o  male with a PMH of HTN and GERD who presents with confusion and depression  Patient was seen in the ED at [address] [place] [address] on 8/21/21 for fevers/ chills, congestion and was diagnosed with COVID-19 at that time  He returned to the ED on 8/23/21 for chest pain and EKG and troponin were normal  He notes some intermittent chest pain since then  He presents now with confusion and depression over the past 1 day as well as intermittent fevers and cough  He describes his confusion further as feeling as though nothing was making sense   He also admits to some depression but denies SI  Review of Systems:  Review of Systems   Constitutional: Positive for fever  HENT: Positive for congestion  Respiratory: Positive for cough  Cardiovascular: Positive for chest pain  Gastrointestinal: Negative for abdominal pain, diarrhea, nausea and vomiting  Genitourinary: Negative for difficulty urinating  Neurological: Negative for dizziness  Psychiatric/Behavioral: Positive for confusion and dysphoric mood  Negative for suicidal ideas  All other systems reviewed and are negative  Past Medical and Surgical History:   Past Medical History:   Diagnosis Date    Chest pain     Diverticulitis     GERD (gastroesophageal reflux disease)     HTN (hypertension)     Pain in left wrist        History reviewed  No pertinent surgical history  Meds/Allergies:  Prior to Admission medications    Medication Sig Start Date End Date Taking?  Authorizing Provider   amLODIPine (NORVASC) 5 mg tablet Take 1 tablet (5 mg total) by mouth daily 7/19/21  Yes FaheemHEIKE Smith   Ascorbic Acid (vitamin C) 1000 MG tablet Take 1 tablet (1,000 mg total) by mouth 2 (two) times a day 8/23/21 9/22/21 Yes Beto Dale PA-C   aspirin (ECOTRIN LOW STRENGTH) 81 mg EC tablet Take 1 tablet (81 mg total) by mouth daily 7/19/21  Yes FaheemHEIKE Rm   atorvastatin (LIPITOR) 40 mg tablet Take 1 tablet (40 mg total) by mouth daily with dinner 5/27/21 9/24/21 Yes FaheemHEIKE Rm   cholecalciferol (VITAMIN D3) 1,000 units tablet Take 2 tablets (2,000 Units total) by mouth daily 8/23/21 9/22/21 Yes Lugene Spies Gutzweiler, PA-C   losartan (COZAAR) 50 mg tablet Take 1 tablet (50 mg total) by mouth daily 5/27/21 9/24/21 Yes FaheemHEIKE Rm   metoprolol tartrate (LOPRESSOR) 50 mg tablet Take 1 tablet the night before your cardiac CT, take 1 tablet the morning of your cardiac CT, and take the remainder of the tablets with you to the test 7/21/21  Yes Mirta Mckeon MD   pantoprazole (PROTONIX) 40 mg tablet Take 1 tablet (40 mg total) by mouth daily in the early morning  Patient not taking: Reported on 7/19/2021 4/18/21 5/27/21  Donya Garcia MD     I have reviewed home medications with a medical source (PCP, Pharmacy, other)  Allergies: No Known Allergies    Social History:  Marital Status: /Civil Union   Patient Pre-hospital Living Situation: Home  Patient Pre-hospital Level of Mobility: walks  Patient Pre-hospital Diet Restrictions: none  Substance Use History:   Social History     Substance and Sexual Activity   Alcohol Use Yes    Comment: socially     Social History     Tobacco Use   Smoking Status Never Smoker   Smokeless Tobacco Never Used     Social History     Substance and Sexual Activity   Drug Use Never       Family History:  Family History   Problem Relation Age of Onset    No Known Problems Mother     No Known Problems Father     No Known Problems Brother        Physical Exam:     Vitals:   Blood Pressure: 153/78 (08/25/21 0500)  Pulse: 76 (08/25/21 0500)  Temperature: 100 2 °F (37 9 °C) (08/25/21 0100)  Temp Source: Oral (08/25/21 0100)  Respirations: 18 (08/25/21 0500)  Height: 5' 7" (170 2 cm) (08/25/21 0200)  Weight - Scale: 83 2 kg (183 lb 6 8 oz) (08/25/21 0200)  SpO2: 95 % (08/25/21 0500)    Physical Exam  Vitals and nursing note reviewed  Constitutional:       Appearance: He is not ill-appearing or diaphoretic  HENT:      Head: Normocephalic and atraumatic  Eyes:      Conjunctiva/sclera: Conjunctivae normal    Cardiovascular:      Rate and Rhythm: Normal rate and regular rhythm  Pulmonary:      Effort: Pulmonary effort is normal  No respiratory distress  Breath sounds: Normal breath sounds  Abdominal:      General: Bowel sounds are normal       Palpations: Abdomen is soft  Tenderness: There is no abdominal tenderness  Musculoskeletal:      Right lower leg: No edema  Left lower leg: No edema  Skin:     General: Skin is warm and dry  Coloration: Skin is not pale  Neurological:      Mental Status: He is alert and oriented to person, place, and time  Psychiatric:         Mood and Affect: Mood normal           Additional Data:     Lab Results:  Results from last 7 days   Lab Units 08/25/21  0008   WBC Thousand/uL 3 10*   HEMOGLOBIN g/dL 13 5   HEMATOCRIT % 41 5   PLATELETS Thousands/uL 90*   NEUTROS PCT % 71   LYMPHS PCT % 24   MONOS PCT % 5   EOS PCT % 0     Results from last 7 days   Lab Units 08/25/21  0008   SODIUM mmol/L 132*   POTASSIUM mmol/L 4 5   CHLORIDE mmol/L 99*   CO2 mmol/L 27   BUN mg/dL 17   CREATININE mg/dL 1 18   ANION GAP mmol/L 6   CALCIUM mg/dL 8 3   ALBUMIN g/dL 3 5   TOTAL BILIRUBIN mg/dL 0 39   ALK PHOS U/L 71   ALT U/L 60   AST U/L 54*   GLUCOSE RANDOM mg/dL 180*     Results from last 7 days   Lab Units 08/25/21  0008   INR  0 98             Results from last 7 days   Lab Units 08/25/21  0212 08/25/21  0210   LACTIC ACID mmol/L 1 0  --    PROCALCITONIN ng/ml  --  0 06       Imaging: Reviewed radiology reports from this admission including: chest CT scan and CT head  XR chest 1 view portable   ED Interpretation by Martir Bhakta MD (08/25 9891)   Diffuse patchy bilateral infiltrates c/w COVID 19 pneumonia read by me  CT head without contrast   ED Interpretation by Martir Bhakta MD (08/25 9459)      FINDINGS:  PARENCHYMA: Decreased attenuation is noted in periventricular and subcortical white matter demonstrating an appearance that is statistically most likely to represent mild microangiopathic change      No CT signs of acute infarction  No intracranial mass, mass effect or midline shift    No acute parenchymal hemorrhage         Well-defined area of decreased attenuation right basal ganglia, likely chronic lacunar infarction versus prominent perivascular space      Bilateral internal carotid artery calcifications            VENTRICLES AND EXTRA-AXIAL SPACES:  Enlargement of ventricles and extra-axial CSF spaces, out of proportion to the patient's age most consistent with cerebral and cerebellar atrophy            VISUALIZED ORBITS AND PARANASAL SINUSES:  No acute abnormality involving the orbits  Mild scattered sinus mucosal thickening is noted  No fluid levels are seen  Mucous retention cyst right maxillary sinus  Bilateral ethan bullosa         CALVARIUM AND EXTRACRAN   IAL SOFT TISSUES:  Normal               IMPRESSION:  Mild cerebral atrophy with chronic small vessel ischemic white matter disease      No acute intracranial abnormality                  Workstation performed: [id number]      Final Result by Crystal Malone DO (08/25 0380)   Mild cerebral atrophy with chronic small vessel ischemic white matter disease  No acute intracranial abnormality  Workstation performed: [id number]         CTA ED chest PE study   ED Interpretation by Hermila Morrell MD (08/25 8198)   FINDINGS:  PULMONARY ARTERIAL TREE:  No pulmonary embolus is seen             LUNGS:  There are numerous, rounded peripheral groundglass infiltrates throughout the lungs bilaterally, most pronounced in the lower lobes  Several of the lesions demonstrate early cavitation         PLEURA:  Unremarkable            HEART/GREAT VESSELS:  The heart is mildly enlarged  Coronary artery calcifications  No evidence of a pericardial effusion         MEDIASTINUM AND MARIA G:  Enlarged lymph nodes are present in the maria g bilaterally, paratracheal region and subcarinal region         CHEST WALL AND LOWER NECK:   Enlarged lymph nodes are present in the axilla bilaterally, right side greater than left           VISUALIZED STRUCTURES IN THE UPPER ABDOMEN:    The liver is enlarged with fatty infiltrative changes      Diverticula are present in the distal transverse colon  Nothing to suggest acute diverticulitis         OSSEOUS STRUCTURES:  Spinal degenerative changes are noted    No acute fracture or de   structive osseous lesion  Scattered Schmorl's nodes throughout the thoracic and upper lumbar spine               IMPRESSION:  1  No CT evidence of pulmonary embolus      2   Bilateral rounded peripheral groundglass infiltrates, most pronounced in the lower lobes bilaterally  Findings are most compatible with multifocal/multi lobar pneumonia, Covid type  In the setting of clinically suspected/proven COVID-19, the above   lung parenchymal findings on CT indicate high confidence level for COVID-19                  I personally discussed this study with Adrienne LIANG on 8/25/2021 at 5:12 AM                     Workstation performed: [id number]      Final Result by Cameron Fletcher DO (08/25 0515)   1  No CT evidence of pulmonary embolus  2   Bilateral rounded peripheral groundglass infiltrates, most pronounced in the lower lobes bilaterally  Findings are most compatible with multifocal/multi lobar pneumonia, Covid type  In the setting of clinically suspected/proven COVID-19, the above    lung parenchymal findings on CT indicate high confidence level for COVID-19  I personally discussed this study with Daily Forbes on 8/25/2021 at 5:12 AM                      Workstation performed: [id number]             EKG and Other Studies Reviewed on Admission:   · EKG: NSR  HR 78     ** Please Note: This note has been constructed using a voice recognition system   **

## 2021-08-25 NOTE — ASSESSMENT & PLAN NOTE
Diagnosed with COVID-19 on 8/21/21  Presented with confusion, fevers and cough  · Will treat according to mild pathway  · CTA chest: No PE  "Bilateral rounded peripheral groundglass infiltrates, most pronounced in the lower lobes bilaterally"  · CRP 57 1, D-dimer 1 26, ferritin 1,355, procalcitonin 0 06  Trop, CK and BNP within normal limits  · Hold off on IV remdesivir and IV steroids as patient is not requiring any oxygen and is not high risk  · Received 1 dose of IV ceftriaxone and doxycycline in the ED; hold off on further antibiotics  · Follow CBC and CMP daily    · Self-proning as able  · Monitor respiratory status, supplemental nasal cannula oxygen as needed

## 2021-08-25 NOTE — ASSESSMENT & PLAN NOTE
· Reports intermittent chest pain  Patient had an abnormal stress test in July 2021 and is scheduled for CTA cardiac next month as he declined cardiac cath  · Initial troponin negative  · Continue to trend  · No ischemic changes noted on EKG  · Monitor on telemetry

## 2021-08-25 NOTE — ED PROVIDER NOTES
History  Chief Complaint   Patient presents with    Psychiatric Evaluation     Pt reports feeling very depressed  He said nothing makes sense to him  Pt reports he needs help trying to feel better  Pt recently dx with covid said it might play a factor   Fever - 9 weeks to 74 years    Altered Mental Status     Patient denies SI  Patient states he does not feel well mentally and states everything feel like a "dream" and c/o feeling light headed, dizziness, and clustered thoughts  Patient thinks it might be related to [employer]  Furthermore, Patient states he feels he is getting worst since last visit to ED 08/23/2021  Patient is a 61year old male with depression today and confusion  (+) cough and intermittent fever  Was diagnosed with COVID 19 on 8/21/21  Had COVID vaccine the day before diagnosis  No travel  (+) ill contact at home with covid 19  No N/V/D  No urinary sx  (+) chest pain  Was last seen in this ED on 8/23/21 for chest pain and covid 19  Denies SI/HI  [facility] website checked on this patient and no Rx found  History provided by:  Patient   used: No    Psychiatric Evaluation  Presenting symptoms: no suicidal thoughts    Associated symptoms: chest pain    Fever - 9 weeks to 74 years  Associated symptoms: chest pain, confusion and cough    Associated symptoms: no diarrhea, no nausea and no vomiting    Altered Mental Status  Presenting symptoms: confusion    Associated symptoms: fever    Associated symptoms: no nausea and no vomiting        Prior to Admission Medications   Prescriptions Last Dose Informant Patient Reported? Taking?    Ascorbic Acid (vitamin C) 1000 MG tablet   No Yes   Sig: Take 1 tablet (1,000 mg total) by mouth 2 (two) times a day   amLODIPine (NORVASC) 5 mg tablet   No Yes   Sig: Take 1 tablet (5 mg total) by mouth daily   aspirin (ECOTRIN LOW STRENGTH) 81 mg EC tablet   No Yes   Sig: Take 1 tablet (81 mg total) by mouth daily   atorvastatin (LIPITOR) 40 mg tablet  Self No Yes   Sig: Take 1 tablet (40 mg total) by mouth daily with dinner   cholecalciferol (VITAMIN D3) 1,000 units tablet   No Yes   Sig: Take 2 tablets (2,000 Units total) by mouth daily   losartan (COZAAR) 50 mg tablet  Self No Yes   Sig: Take 1 tablet (50 mg total) by mouth daily   metoprolol tartrate (LOPRESSOR) 50 mg tablet   No Yes   Sig: Take 1 tablet the night before your cardiac CT, take 1 tablet the morning of your cardiac CT, and take the remainder of the tablets with you to the test   pantoprazole (PROTONIX) 40 mg tablet  Self No No   Sig: Take 1 tablet (40 mg total) by mouth daily in the early morning   Patient not taking: Reported on 7/19/2021      Facility-Administered Medications: None       Past Medical History:   Diagnosis Date    Chest pain     Diverticulitis     GERD (gastroesophageal reflux disease)     HTN (hypertension)     Pain in left wrist        History reviewed  No pertinent surgical history  Family History   Problem Relation Age of Onset    No Known Problems Mother     No Known Problems Father     No Known Problems Brother      I have reviewed and agree with the history as documented  E-Cigarette/Vaping    E-Cigarette Use Never User      E-Cigarette/Vaping Substances    Nicotine No     THC No     CBD No     Flavoring No     Other No     Unknown No      Social History     Tobacco Use    Smoking status: Never Smoker    Smokeless tobacco: Never Used   Vaping Use    Vaping Use: Never used   Substance Use Topics    Alcohol use: Yes     Comment: socially    Drug use: Never       Review of Systems   Constitutional: Positive for fever  Respiratory: Positive for cough  Negative for shortness of breath  Cardiovascular: Positive for chest pain  Gastrointestinal: Negative for diarrhea, nausea and vomiting  Genitourinary: Negative for difficulty urinating  Psychiatric/Behavioral: Positive for confusion and dysphoric mood  Negative for suicidal ideas  All other systems reviewed and are negative  Physical Exam  Physical Exam  Vitals and nursing note reviewed  Constitutional:       General: He is in acute distress (moderate)  Comments: Awake   HENT:      Head: Normocephalic and atraumatic  Mouth/Throat:      Comments: Mask in place  Eyes:      General: No scleral icterus  Extraocular Movements: Extraocular movements intact  Pupils: Pupils are equal, round, and reactive to light  Cardiovascular:      Rate and Rhythm: Normal rate and regular rhythm  Heart sounds: Normal heart sounds  No murmur heard  Pulmonary:      Effort: Pulmonary effort is normal  No respiratory distress  Breath sounds: Normal breath sounds  No stridor  No wheezing, rhonchi or rales  Abdominal:      General: Bowel sounds are normal       Palpations: Abdomen is soft  Tenderness: There is no abdominal tenderness  Musculoskeletal:         General: No deformity  Cervical back: Normal range of motion and neck supple  No rigidity  Right lower leg: No edema  Left lower leg: No edema  Skin:     General: Skin is warm and dry  Findings: No erythema or rash  Neurological:      General: No focal deficit present  Comments: Knows place  Believes it is July 2001  Psychiatric:      Comments: Flat affect            Vital Signs  ED Triage Vitals   Temperature Pulse Respirations Blood Pressure SpO2   08/25/21 0004 08/25/21 0004 08/25/21 0004 08/25/21 0004 08/25/21 0004   (!) 102 2 °F (39 °C) 85 20 144/82 97 %      Temp Source Heart Rate Source Patient Position - Orthostatic VS BP Location FiO2 (%)   08/25/21 0004 08/25/21 0004 08/25/21 0004 08/25/21 0004 --   Oral Monitor Sitting Right arm       Pain Score       08/25/21 0010       Med Not Given for Pain - for MAR use only           Vitals:    08/25/21 0100 08/25/21 0158 08/25/21 0200 08/25/21 0500   BP: 150/80  138/73 153/78   Pulse: 76  74 76   Patient Position - Orthostatic VS: Lying Lying Lying Lying         Visual Acuity  Visual Acuity      Most Recent Value   L Pupil Size (mm)  3   R Pupil Size (mm)  3          ED Medications  Medications   sodium chloride 0 9 % infusion (125 mL/hr Intravenous New Bag 8/25/21 0241)   acetaminophen (TYLENOL) tablet 650 mg (650 mg Oral Given 8/25/21 0010)   sodium chloride 0 9 % bolus 1,000 mL (1,000 mL Intravenous New Bag 8/25/21 0241)   iohexol (OMNIPAQUE) 350 MG/ML injection (SINGLE-DOSE) 85 mL (85 mL Intravenous Given 8/25/21 0355)   ceftriaxone (ROCEPHIN) 1 g/50 mL in dextrose IVPB (1,000 mg Intravenous New Bag 8/25/21 0507)   doxycycline hyclate (VIBRAMYCIN) capsule 100 mg (100 mg Oral Given 8/25/21 0507)       Diagnostic Studies  Results Reviewed     Procedure Component Value Units Date/Time    Ferritin [[id number]]  (Abnormal) Collected: 08/25/21 0008    Lab Status: Final result Specimen: Blood from Arm, Left Updated: 08/25/21 0516     Ferritin 1,355 ng/mL     Urine culture [[id number]] Collected: 08/25/21 0336    Lab Status:  In process Specimen: Urine, Clean Catch Updated: 08/25/21 0336    Urine Microscopic [[id number]]  (Abnormal) Collected: 08/25/21 0217    Lab Status: Final result Specimen: Urine, Clean Catch Updated: 08/25/21 0331     RBC, UA None Seen /hpf      WBC, UA 2-4 /hpf      Epithelial Cells Occasional /hpf      Bacteria, UA None Seen /hpf      OTHER OBSERVATIONS Renal Epithelial Cells Present     MUCUS THREADS Moderate    C-reactive protein [[id number]]  (Abnormal) Collected: 08/25/21 0008    Lab Status: Final result Specimen: Blood from Arm, Left Updated: 08/25/21 0313     CRP 57 1 mg/L     CKMB [[id number]]  (Normal) Collected: 08/25/21 0008    Lab Status: Final result Specimen: Blood from Arm, Left Updated: 08/25/21 0313     CK-MB Index <1 0 %      CK-MB 0 1 ng/mL     Ethanol [[id number]]  (Normal) Collected: 08/25/21 0211    Lab Status: Final result Specimen: Blood from Arm, Left Updated: 08/25/21 0313     Ethanol Lvl <3 mg/dL Lactic acid, plasma [[id number]]  (Normal) Collected: 08/25/21 0212    Lab Status: Final result Specimen: Blood from Arm, Left Updated: 08/25/21 0251     LACTIC ACID 1 0 mmol/L     Narrative:      Result may be elevated if tourniquet was used during collection  Rapid drug screen, urine [[id number]]  (Normal) Collected: 08/25/21 0214    Lab Status: Final result Specimen: Urine, Other Updated: 08/25/21 0246     Amph/Meth UR Negative     Barbiturate Ur Negative     Benzodiazepine Urine Negative     Cocaine Urine Negative     Methadone Urine Negative     Opiate Urine Negative     PCP Ur Negative     THC Urine Negative     Oxycodone Urine Negative    Narrative:      FOR MEDICAL PURPOSES ONLY  IF CONFIRMATION NEEDED PLEASE CONTACT THE LAB WITHIN 5 DAYS  Drug Screen Cutoff Levels:  AMPHETAMINE/METHAMPHETAMINES  1000 ng/mL  BARBITURATES     200 ng/mL  BENZODIAZEPINES     200 ng/mL  COCAINE      300 ng/mL  METHADONE      300 ng/mL  OPIATES      300 ng/mL  PHENCYCLIDINE     25 ng/mL  THC       50 ng/mL  OXYCODONE      969 ng/mL    Salicylate level [[id number]]  (Abnormal) Collected: 08/25/21 0210    Lab Status: Final result Specimen: Blood from Arm, Left Updated: 93/74/93 4255     Salicylate Lvl <3 mg/dL     Acetaminophen level-If concentration is detectable, please discuss with medical  on call   [[id number]]  (Abnormal) Collected: 08/25/21 0210    Lab Status: Final result Specimen: Blood from Arm, Left Updated: 08/25/21 0246     Acetaminophen Level 5 3 ug/mL     NT-BNP PRO [[id number]]  (Normal) Collected: 08/25/21 0008    Lab Status: Final result Specimen: Blood from Arm, Left Updated: 08/25/21 0245     NT-proBNP 51 pg/mL     Triglycerides [[id number]]  (Normal) Collected: 08/25/21 0008    Lab Status: Final result Specimen: Blood from Arm, Left Updated: 08/25/21 0245     Triglycerides 95 mg/dL     Magnesium [[id number]]  (Normal) Collected: 08/25/21 0008    Lab Status: Final result Specimen: Blood from Arm, Left Updated: 08/25/21 0245     Magnesium 2 1 mg/dL     CK (with reflex to MB) [[id number]]  (Normal) Collected: 08/25/21 0008    Lab Status: Final result Specimen: Blood from Arm, Left Updated: 08/25/21 0245     Total  U/L     Ammonia [[id number]]  (Normal) Collected: 08/25/21 0211    Lab Status: Final result Specimen: Blood from Arm, Left Updated: 08/25/21 0244     Ammonia 17 umol/L     Calcium, ionized [[id number]]  (Abnormal) Collected: 08/25/21 0215    Lab Status: Final result Specimen: Blood from Arm, Left Updated: 08/25/21 0230     Calcium, Ionized 1 07 mmol/L     Glucose 6 phosphate dehydrogenase [[id number]] Collected: 08/25/21 0211    Lab Status: In process Specimen: Blood from Arm, Left Updated: 08/25/21 0225    Procalcitonin with AM Reflex [[id number]] Collected: 08/25/21 0210    Lab Status: In process Specimen: Blood from Arm, Left Updated: 08/25/21 0225    Blood culture #1 [[id number]] Collected: 08/25/21 0211    Lab Status: In process Specimen: Blood from Arm, Left Updated: 08/25/21 0224    Blood culture #2 [[id number]] Collected: 08/25/21 0211    Lab Status:  In process Specimen: Blood from Arm, Right Updated: 08/25/21 0224    Urine Macroscopic, POC [[id number]]  (Abnormal) Collected: 08/25/21 0217    Lab Status: Final result Specimen: Urine Updated: 08/25/21 0219     Color, UA Rut     Clarity, UA Clear     pH, UA 5 5     Leukocytes, UA Negative     Nitrite, UA Negative     Protein,  (2+) mg/dl      Glucose, UA Negative mg/dl      Ketones, UA Negative mg/dl      Urobilinogen, UA 0 2 E U /dl      Bilirubin, UA Negative     Blood, UA Negative     Specific Gravity, UA 1 025    Narrative:      CLINITEK RESULT    D-dimer, quantitative [[id number]]  (Abnormal) Collected: 08/25/21 0008    Lab Status: Final result Specimen: Blood from Arm, Left Updated: 08/25/21 0213     D-Dimer, Quant 1 26 ug/ml FEU     Protime-INR [[id number]]  (Normal) Collected: 08/25/21 0008    Lab Status: Final result Specimen: Blood from Arm, Left Updated: 08/25/21 0210     Protime 13 1 seconds      INR 0 98    [place] draw [[id number]] Collected: 08/25/21 0008    Lab Status: Final result Specimen: Blood from Arm, Left Updated: 08/25/21 0201    Narrative: The following orders were created for panel order [place] draw  Procedure                               Abnormality         Status                     ---------                               -----------         ------                     Krystyna Scriver Top on FHVE[[id number]]                           Final result               Gold top on AQYN[[id number]]                                 Final result               Green / Black tube on VCNX[[id number]]                       Final result                 Please view results for these tests on the individual orders  POCT alcohol breath test [[id number]]  (Normal) Resulted: 08/25/21 0157    Lab Status: Final result Updated: 08/25/21 0157     EXTBreath Alcohol 0    TSH [[id number]]  (Normal) Collected: 08/25/21 0008    Lab Status: Final result Specimen: Blood from Arm, Left Updated: 08/25/21 0051     TSH 3RD GENERATON 0 966 uIU/mL     Narrative:      Patients undergoing fluorescein dye angiography may retain small amounts of fluorescein in the body for 48-72 hours post procedure  Samples containing fluorescein can produce falsely depressed TSH values  If the patient had this procedure,a specimen should be resubmitted post fluorescein clearance        CBC and differential [[id number]]  (Abnormal) Collected: 08/25/21 0008    Lab Status: Final result Specimen: Blood from Arm, Left Updated: 08/25/21 0048     WBC 3 10 Thousand/uL      RBC 4 89 Million/uL      Hemoglobin 13 5 g/dL      Hematocrit 41 5 %      MCV 85 fL      MCH 27 6 pg      MCHC 32 5 g/dL      RDW 12 5 %      MPV 10 9 fL      Platelets 90 Thousands/uL      nRBC 0 /100 WBCs      Neutrophils Relative 71 %      Immat GRANS % 0 %      Lymphocytes Relative 24 %      Monocytes Relative 5 % Eosinophils Relative 0 %      Basophils Relative 0 %      Neutrophils Absolute 2 20 Thousands/µL      Immature Grans Absolute 0 01 Thousand/uL      Lymphocytes Absolute 0 74 Thousands/µL      Monocytes Absolute 0 15 Thousand/µL      Eosinophils Absolute 0 00 Thousand/µL      Basophils Absolute 0 00 Thousands/µL     Comprehensive metabolic panel [[id number]]  (Abnormal) Collected: 08/25/21 0008    Lab Status: Final result Specimen: Blood from Arm, Left Updated: 08/25/21 0038     Sodium 132 mmol/L      Potassium 4 5 mmol/L      Chloride 99 mmol/L      CO2 27 mmol/L      ANION GAP 6 mmol/L      BUN 17 mg/dL      Creatinine 1 18 mg/dL      Glucose 180 mg/dL      Calcium 8 3 mg/dL      AST 54 U/L      ALT 60 U/L      Alkaline Phosphatase 71 U/L      Total Protein 7 4 g/dL      Albumin 3 5 g/dL      Total Bilirubin 0 39 mg/dL      eGFR 79 ml/min/1 73sq m     Narrative:      [place] guidelines for Chronic Kidney Disease (CKD):     Stage 1 with normal or high GFR (GFR > 90 mL/min/1 73 square meters)    Stage 2 Mild CKD (GFR = 60-89 mL/min/1 73 square meters)    Stage 3A Moderate CKD (GFR = 45-59 mL/min/1 73 square meters)    Stage 3B Moderate CKD (GFR = 30-44 mL/min/1 73 square meters)    Stage 4 Severe CKD (GFR = 15-29 mL/min/1 73 square meters)    Stage 5 End Stage CKD (GFR <15 mL/min/1 73 square meters)  Note: GFR calculation is accurate only with a steady state creatinine                 XR chest 1 view portable   ED Interpretation by Jaswant Rose MD (08/25 2047)   Diffuse patchy bilateral infiltrates c/w COVID 19 pneumonia read by me  CT head without contrast   ED Interpretation by Jaswant Rose MD (08/25 2970)      FINDINGS:  PARENCHYMA: Decreased attenuation is noted in periventricular and subcortical white matter demonstrating an appearance that is statistically most likely to represent mild microangiopathic change      No CT signs of acute infarction    No intracranial mass, mass effect or midline shift  No acute parenchymal hemorrhage         Well-defined area of decreased attenuation right basal ganglia, likely chronic lacunar infarction versus prominent perivascular space      Bilateral internal carotid artery calcifications            VENTRICLES AND EXTRA-AXIAL SPACES:  Enlargement of ventricles and extra-axial CSF spaces, out of proportion to the patient's age most consistent with cerebral and cerebellar atrophy            VISUALIZED ORBITS AND PARANASAL SINUSES:  No acute abnormality involving the orbits  Mild scattered sinus mucosal thickening is noted  No fluid levels are seen  Mucous retention cyst right maxillary sinus  Bilateral ethan bullosa         CALVARIUM AND EXTRACRAN   IAL SOFT TISSUES:  Normal               IMPRESSION:  Mild cerebral atrophy with chronic small vessel ischemic white matter disease      No acute intracranial abnormality                  Workstation performed: [id number]      Final Result by Jenelle Ferguson DO (08/25 4178)   Mild cerebral atrophy with chronic small vessel ischemic white matter disease  No acute intracranial abnormality  Workstation performed: [id number]         CTA ED chest PE study   ED Interpretation by Nidhi King MD (08/25 8646)   FINDINGS:  PULMONARY ARTERIAL TREE:  No pulmonary embolus is seen             LUNGS:  There are numerous, rounded peripheral groundglass infiltrates throughout the lungs bilaterally, most pronounced in the lower lobes  Several of the lesions demonstrate early cavitation         PLEURA:  Unremarkable            HEART/GREAT VESSELS:  The heart is mildly enlarged  Coronary artery calcifications    No evidence of a pericardial effusion         MEDIASTINUM AND MARIA G:  Enlarged lymph nodes are present in the maria g bilaterally, paratracheal region and subcarinal region         CHEST WALL AND LOWER NECK:   Enlarged lymph nodes are present in the axilla bilaterally, right side greater than left           VISUALIZED STRUCTURES IN THE UPPER ABDOMEN:    The liver is enlarged with fatty infiltrative changes      Diverticula are present in the distal transverse colon  Nothing to suggest acute diverticulitis         OSSEOUS STRUCTURES:  Spinal degenerative changes are noted  No acute fracture or de   structive osseous lesion  Scattered Schmorl's nodes throughout the thoracic and upper lumbar spine               IMPRESSION:  1  No CT evidence of pulmonary embolus      2   Bilateral rounded peripheral groundglass infiltrates, most pronounced in the lower lobes bilaterally  Findings are most compatible with multifocal/multi lobar pneumonia, Covid type  In the setting of clinically suspected/proven COVID-19, the above   lung parenchymal findings on CT indicate high confidence level for COVID-19                  I personally discussed this study with Jeni LIANG on 8/25/2021 at 5:12 AM                     Workstation performed: [id number]      Final Result by Aaliyah Gapsar DO (08/25 0515)   1  No CT evidence of pulmonary embolus  2   Bilateral rounded peripheral groundglass infiltrates, most pronounced in the lower lobes bilaterally  Findings are most compatible with multifocal/multi lobar pneumonia, Covid type  In the setting of clinically suspected/proven COVID-19, the above    lung parenchymal findings on CT indicate high confidence level for COVID-19                     I personally discussed this study with Arianne Seaman on 8/25/2021 at 5:12 AM                      Workstation performed: [id number]                    Procedures  ECG 12 Lead Documentation Only    Date/Time: 8/25/2021 1:55 AM  Performed by: Elle Ash MD  Authorized by: Elle Ash MD     Indications / Diagnosis:  Chest pain  ECG reviewed by me, the ED Provider: yes    Patient location:  ED  Previous ECG:     Previous ECG:  Compared to current    Comparison ECG info:  8/23/21    Similarity:  Changes noted (no nonspecific ST and T wave changes now)  Quality:     Tracing quality:  Limited by artifact  Rate:     ECG rate:  78    ECG rate assessment: normal    Rhythm:     Rhythm: sinus rhythm    Ectopy:     Ectopy: none    QRS:     QRS axis:  Normal    QRS intervals:  Normal  Conduction:     Conduction: normal    ST segments:     ST segments:  Normal  T waves:     T waves: normal               ED Course  ED Course as of Aug 25 0542   Wed Aug 25, 2021   0217 CT PE study ordered  D-Dimer, Quant(!): 1 26   0425 Labs and CXR d/w patient  3455 IV abx ordered  WBC(!): 3 10   0528 CTs d/w patient  PERC Rule for PE      Most Recent Value   PERC Rule for PE   Age >=50  1 Filed at: 08/25/2021 0217   HR >=100  --   O2 Sat on room air < 95%  --   History of PE or DVT  --   Recent trauma or surgery  --   Hemoptysis  --   Exogenous estrogen  --   Unilateral leg swelling  --   PERC Rule for PE Results  1 Filed at: 08/25/2021 2973          Initial Sepsis Screening     Row Name 08/25/21 0411                Is the patient's history suggestive of a new or worsening infection? (!) Yes (Proceed)  -AO        Suspected source of infection  pneumonia  -AO        Are two or more of the following signs & symptoms of infection both present and new to the patient? (!) Yes (Proceed)  -AO        Indicate SIRS criteria  Leukopenia (WBC < 4000 IJL); Hyperthemia > 38 3C (100 9F)  -AO        If the answer is yes to both questions, suspicion of sepsis is present  --        If severe sepsis is present AND tissue hypoperfusion perists in the hour after fluid resuscitation or lactate > 4, the patient meets criteria for SEPTIC SHOCK  --        Are any of the following organ dysfunction criteria present within 6 hours of suspected infection and SIRS criteria that are NOT considered to be chronic conditions?   No  -AO        Organ dysfunction  --        Date of presentation of severe sepsis  --        Time of presentation of severe sepsis  --        Tissue hypoperfusion persists in the hour after crystalloid fluid administration, evidenced, by either:  --        Was hypotension present within one hour of the conclusion of crystalloid fluid administration?  --        Date of presentation of septic shock  --        Time of presentation of septic shock  --          User Key  (r) = Recorded By, (t) = Taken By, (c) = Cosigned By    [address] Name Provider Mike Rogel MD Physician          SBIRT 22yo+      Most Recent Value   SBIRT (25 yo +)   In order to provide better care to our patients, we are screening all of our patients for alcohol and drug use  Would it be okay to ask you these screening questions? Yes Filed at: 08/25/2021 0113   Initial Alcohol Screen: US AUDIT-C    1  How often do you have a drink containing alcohol? 1 Filed at: 08/25/2021 0113   2  How many drinks containing alcohol do you have on a typical day you are drinking? 0 Filed at: 08/25/2021 0113   3a  Male UNDER 65: How often do you have five or more drinks on one occasion? 0 Filed at: 08/25/2021 0113   Audit-C Score  1 Filed at: 08/25/2021 5511   KIKI: How many times in the past year have you    Used an illegal drug or used a prescription medication for non-medical reasons?   Never Filed at: 08/25/2021 1560          Wells' Criteria for PE      Most Recent Value   Wells' Criteria for PE   Clinical signs and symptoms of DVT  0 Filed at: 08/25/2021 4156   PE is primary diagnosis or equally likely  3 Filed at: 08/25/2021 0217   HR >100  0 Filed at: 08/25/2021 0217   Immobilization at least 3 days or Surgery in the previous 4 weeks  0 Filed at: 08/25/2021 0217   Previous, objectively diagnosed PE or DVT  0 Filed at: 08/25/2021 0217   Hemoptysis  0 Filed at: 08/25/2021 0217   Malignancy with treatment within 6 months or palliative  0 Filed at: 08/25/2021 5028   Wells' Criteria Total  3 Filed at: 08/25/2021 0217                MDM  Number of Diagnoses or Management Options  Diagnosis management comments: DDx including but not limited to: metabolic abnormality, intracranial etiology, cardiac etiology, substance abuse, infectious etiology including UTI, thyroid disease, hyperammonemia, delirium, dementia, overmedication, COVID 19, pneumonia, PE  Amount and/or Complexity of Data Reviewed  Clinical lab tests: ordered and reviewed  Tests in the radiology section of CPT®: ordered and reviewed  Decide to obtain previous medical records or to obtain history from someone other than the patient: yes  Review and summarize past medical records: yes  Discuss the patient with other providers: yes  Independent visualization of images, tracings, or specimens: yes        Disposition  Final diagnoses:   Pneumonia due to COVID-19 virus   Sepsis ([facility] [address] )   Delirium   AMS (altered mental status)   Depression     Time reflects when diagnosis was documented in both MDM as applicable and the Disposition within this note     Time User Action Codes Description Comment    8/25/2021  4:12 AM Philly Fontan Add [U07 1,  J12 82] Pneumonia due to COVID-19 virus     8/25/2021  4:12 AM Philly Fontan Add [A41 9] Sepsis ([facility] [address] )     8/25/2021  4:12 AM Philly Fontan Add [R41 0] Delirium     8/25/2021  4:12 AM Philly Fontan Add [R41 82] AMS (altered mental status)     8/25/2021  4:12 AM Philly Fontan Add [F32 9] Depression       ED Disposition     ED Disposition Condition Date/Time Comment    Admit Stable Wed Aug 25, 2021  5:42 AM Case was discussed with GORGE Glass and the patient's admission status was agreed to be Admission Status: inpatient status to the service of Dr Taylor Gutierrez   Follow-up Information    None         Patient's Medications   Discharge Prescriptions    No medications on file     No discharge procedures on file      PDMP Review       Value Time User    PDMP Reviewed  Yes 8/25/2021  1:44 Ena Hogue MD          ED Provider  Electronically Signed by           Vik Luna MD  08/25/21 7585

## 2021-08-25 NOTE — ASSESSMENT & PLAN NOTE
· Platelet count 90 on presentation  · In the setting of COVID-19 infection  · Prior PLT count two days ago was 88  · Continue to monitor

## 2021-08-25 NOTE — ASSESSMENT & PLAN NOTE
· POA as evidenced by fever with temp of 102 2 and leukopenia  · In the setting of COVID-19 pneumonia, diagnosed on 8/21/21  · WBC 3 10 on admission but on prior lab work from 8/23 WBC were 2 51  · Received IV ceftriaxone and doxycycline in the ED  · Initial procal negative  Hold off on further antibiotics  · Follow-up on blood cultures

## 2021-08-25 NOTE — ASSESSMENT & PLAN NOTE
· Presented with confusion and depression for the past 1 day  Denies SI  Reports feeling better since presentation and is currently alert and oriented x 3    · In the setting of infection/ COVID-19  · CT head: "Mild cerebral atrophy with chronic small vessel ischemic white matter disease  No acute intracranial abnormality "  · Lab work with mild hyponatremia of 132, TSH within normal limits  Acetaminophen level 5 3, he reports his last Tylenol use was 2 days ago and denies excessive use  · UDS negative  Ethanol negative  · Monitor neuro checks  · Consider neurology evaluation

## 2021-08-25 NOTE — ED NOTES
Patient c/o acid reflux, requesting medications  SLIM made aware of pain as awell as fever  Orders requested        Marquita Boyd RN  08/25/21 0196

## 2021-08-25 NOTE — SEPSIS NOTE
Sepsis Note   Helena Vasquez 61 y o  male MRN: [id number]  Unit/Bed#: ED 15 Encounter: [id number]      qSOFA     Row Name 08/25/21 0200 08/25/21 0158 08/25/21 0100 08/25/21 0004       Altered mental status GCS < 15  --  0  --  --     Respiratory Rate > / =22  0  --  0  0     Systolic BP < / =613  0  --  0  0     Q Sofa Score  0  0  0  0         Initial Sepsis Screening     Row Name 08/25/21 0411                Is the patient's history suggestive of a new or worsening infection? (!) Yes (Proceed)  -AO        Suspected source of infection  pneumonia  -AO        Are two or more of the following signs & symptoms of infection both present and new to the patient? (!) Yes (Proceed)  -AO        Indicate SIRS criteria  Leukopenia (WBC < 4000 IJL); Hyperthemia > 38 3C (100 9F)  -AO        If the answer is yes to both questions, suspicion of sepsis is present  --        If severe sepsis is present AND tissue hypoperfusion perists in the hour after fluid resuscitation or lactate > 4, the patient meets criteria for SEPTIC SHOCK  --        Are any of the following organ dysfunction criteria present within 6 hours of suspected infection and SIRS criteria that are NOT considered to be chronic conditions?   No  -AO        Organ dysfunction  --        Date of presentation of severe sepsis  --        Time of presentation of severe sepsis  --        Tissue hypoperfusion persists in the hour after crystalloid fluid administration, evidenced, by either:  --        Was hypotension present within one hour of the conclusion of crystalloid fluid administration?  --        Date of presentation of septic shock  --        Time of presentation of septic shock  --          User Key  (r) = Recorded By, (t) = Taken By, (c) = Cosigned By    [address] Name Provider Prerna Bach MD Physician

## 2021-08-25 NOTE — ED NOTES
First set of blood cultures drawn in triage if needed, placed at charge desk      [facility], [address] [place] [address]  08/25/21 0005

## 2021-08-26 VITALS
RESPIRATION RATE: 18 BRPM | OXYGEN SATURATION: 92 % | HEIGHT: 67 IN | DIASTOLIC BLOOD PRESSURE: 79 MMHG | TEMPERATURE: 99 F | WEIGHT: 183.42 LBS | SYSTOLIC BLOOD PRESSURE: 155 MMHG | HEART RATE: 80 BPM | BODY MASS INDEX: 28.79 KG/M2

## 2021-08-26 LAB
ANION GAP SERPL CALCULATED.3IONS-SCNC: 7 MMOL/L (ref 4–13)
BACTERIA UR CULT: NORMAL
BUN SERPL-MCNC: 13 MG/DL (ref 5–25)
CALCIUM SERPL-MCNC: 8 MG/DL (ref 8.3–10.1)
CHLORIDE SERPL-SCNC: 103 MMOL/L (ref 100–108)
CO2 SERPL-SCNC: 28 MMOL/L (ref 21–32)
CREAT SERPL-MCNC: 0.89 MG/DL (ref 0.6–1.3)
ERYTHROCYTE [DISTWIDTH] IN BLOOD BY AUTOMATED COUNT: 12.8 % (ref 11.6–15.1)
EST. AVERAGE GLUCOSE BLD GHB EST-MCNC: 163 MG/DL
G6PD BLD QN: 184 U/10E12 RBC (ref 127–427)
GFR SERPL CREATININE-BSD FRML MDRD: 110 ML/MIN/1.73SQ M
GLUCOSE SERPL-MCNC: 145 MG/DL (ref 65–140)
HBA1C MFR BLD: 7.3 %
HCT VFR BLD AUTO: 42.3 % (ref 36.5–49.3)
HGB BLD-MCNC: 14 G/DL (ref 12–17)
MCH RBC QN AUTO: 27.8 PG (ref 26.8–34.3)
MCHC RBC AUTO-ENTMCNC: 33.1 G/DL (ref 31.4–37.4)
MCV RBC AUTO: 84 FL (ref 82–98)
PLATELET # BLD AUTO: 102 THOUSANDS/UL (ref 149–390)
PMV BLD AUTO: 10.9 FL (ref 8.9–12.7)
POTASSIUM SERPL-SCNC: 3.8 MMOL/L (ref 3.5–5.3)
RBC # BLD AUTO: 4.58 X10E6/UL (ref 4.14–5.8)
RBC # BLD AUTO: 5.04 MILLION/UL (ref 3.88–5.62)
SODIUM SERPL-SCNC: 138 MMOL/L (ref 136–145)
WBC # BLD AUTO: 2.89 THOUSAND/UL (ref 4.31–10.16)

## 2021-08-26 PROCEDURE — 85027 COMPLETE CBC AUTOMATED: CPT | Performed by: PHYSICIAN ASSISTANT

## 2021-08-26 PROCEDURE — 80048 BASIC METABOLIC PNL TOTAL CA: CPT | Performed by: INTERNAL MEDICINE

## 2021-08-26 PROCEDURE — 83036 HEMOGLOBIN GLYCOSYLATED A1C: CPT | Performed by: PHYSICIAN ASSISTANT

## 2021-08-26 RX ADMIN — ASPIRIN 81 MG: 81 TABLET, COATED ORAL at 10:35

## 2021-08-26 RX ADMIN — AMLODIPINE BESYLATE 5 MG: 5 TABLET ORAL at 10:36

## 2021-08-26 RX ADMIN — LOSARTAN POTASSIUM 50 MG: 50 TABLET, FILM COATED ORAL at 10:36

## 2021-08-26 RX ADMIN — ENOXAPARIN SODIUM 40 MG: 40 INJECTION SUBCUTANEOUS at 10:36

## 2021-08-26 RX ADMIN — CALCIUM CARBONATE (ANTACID) CHEW TAB 500 MG 500 MG: 500 CHEW TAB at 09:07

## 2021-08-26 RX ADMIN — GUAIFENESIN 600 MG: 600 TABLET, EXTENDED RELEASE ORAL at 10:35

## 2021-08-26 NOTE — PLAN OF CARE
Problem: PAIN - ADULT  Goal: Verbalizes/displays adequate comfort level or baseline comfort level  Description: Interventions:  - Encourage patient to monitor pain and request assistance  - Assess pain using appropriate pain scale  - Administer analgesics based on type and severity of pain and evaluate response  - Implement non-pharmacological measures as appropriate and evaluate response  - Consider cultural and social influences on pain and pain management  - Notify physician/advanced practitioner if interventions unsuccessful or patient reports new pain  Outcome: Progressing     Problem: INFECTION - ADULT  Goal: Absence or prevention of progression during hospitalization  Description: INTERVENTIONS:  - Assess and monitor for signs and symptoms of infection  - Monitor lab/diagnostic results  - Monitor all insertion sites, i e  indwelling lines, tubes, and drains  - Monitor endotracheal if appropriate and nasal secretions for changes in amount and color  - [place] appropriate cooling/warming therapies per order  - Administer medications as ordered  - Instruct and encourage patient and family to use good hand hygiene technique  - Identify and instruct in appropriate isolation precautions for identified infection/condition  Outcome: Progressing     Problem: DISCHARGE PLANNING  Goal: Discharge to home or other facility with appropriate resources  Description: INTERVENTIONS:  - Identify barriers to discharge w/patient and caregiver  - Arrange for needed discharge resources and transportation as appropriate  - Identify discharge learning needs (meds, wound care, etc )  - Arrange for interpretive services to assist at discharge as needed  - Refer to Case Management Department for coordinating discharge planning if the patient needs post-hospital services based on physician/advanced practitioner order or complex needs related to functional status, cognitive ability, or social support system  Outcome: Progressing   Problem: Knowledge Deficit  Goal: Patient/family/caregiver demonstrates understanding of disease process, treatment plan, medications, and discharge instructions  Description: Complete learning assessment and assess knowledge base  Interventions:  - Provide teaching at level of understanding  - Provide teaching via preferred learning methods  Outcome: Progressing     Problem: Nutrition/Hydration-ADULT  Goal: Nutrient/Hydration intake appropriate for improving, restoring or maintaining nutritional needs  Description: Monitor and assess patient's nutrition/hydration status for malnutrition  Collaborate with interdisciplinary team and initiate plan and interventions as ordered  Monitor patient's weight and dietary intake as ordered or per policy  Utilize nutrition screening tool and intervene as necessary  Determine patient's food preferences and provide high-protein, high-caloric foods as appropriate       INTERVENTIONS:  - Monitor oral intake, urinary output, labs, and treatment plans  - Assess nutrition and hydration status and recommend course of action  - Evaluate amount of meals eaten  - Assist patient with eating if necessary   - Allow adequate time for meals  - Recommend/ encourage appropriate diets, oral nutritional supplements, and vitamin/mineral supplements  - Order, calculate, and assess calorie counts as needed  - Recommend, monitor, and adjust tube feedings and TPN/PPN based on assessed needs  - Assess need for intravenous fluids  - Provide specific nutrition/hydration education as appropriate  - Include patient/family/caregiver in decisions related to nutrition  Outcome: Progressing

## 2021-08-26 NOTE — PROGRESS NOTES
[facility]  [facility] - [place] 1965, 61 y o  male MRN: [id number]  Unit/Bed#: S -01 Encounter: [id number]  Primary Care Provider: No primary care provider on file  Date and time admitted to hospital: 8/25/2021 12:42 AM    * Confusion  Assessment & Plan  Assessment and PLAN  · Presented with confusion and depression, however currently endorses that he knows where he is, does not feel confused, and is reporting that his depression has subsided  · He is AAOX3, denies Homicidal/Suicidal ideation, and is reporting that his mood is normal and he is feeling better  · Says that his previous mental state was due to COVID symptoms and not being able to get his fever under control  · Etiology of patient's confusion still unclear, consider psychological evaluation        Sepsis [facility])  Assessment & Plan  Assessment  Patient had a fever of 101-102 2  This morning patient Temp at 99F without any Acetaminophen since 00:10 today  · Follow-up on blood cultures  Pneumonia due to COVID-19 virus  Assessment & Plan  Diagnosed with COVID-19 on 8/21/21  Presented with confusion, fevers and cough  · Follow CBC and CMP daily  · Self-proning as able  · Monitor respiratory status, supplemental nasal cannula oxygen as needed    Chest pain-rule out ACS  Assessment & Plan  · Reports intermittent chest pain  Patient had an abnormal stress test in July 2021 and is scheduled for CTA cardiac next month as he declined cardiac cath  · Troponin negative  · No ischemic changes noted on EKG  · Continue monitoring        Thrombocytopenia (HCC)  Assessment & Plan  · Platelet count 121 on presentation  · In the setting of COVID-19 infection  · Continue to monitor  Hypertension  Assessment & Plan  · BP acceptable  · Continue losartan  Hypercholesterolemia  Assessment & Plan  · Continue statin              VTE Pharmacologic Prophylaxis:   VTE Score: 3 Moderate Risk (Score 3-4) - Pharmacological DVT Prophylaxis Ordered: Enoxaparin (Lovenox)  Mechanical VTE Prophylaxis in Place: No    Patient Centered Rounds: I have performed bedside rounds with nursing staff today  Discussions with Specialists or Other Care Team Provider: none    Education and Discussions with Family / Patient: Patient declined call to   Current Length of Stay: 1 day(s)    Current Patient Status: Inpatient     Discharge Plan / Estimated Discharge Date: tomorrow    Code Status: Level 1 - Full Code      Subjective:   Patient today reports that he is not confused, and he is not experiencing any pain anywhere  Reports that he is just having a cough every once in a while  Reports that the TUMs helps with with his acid reflux, and is not experiencing any nausea/vomiting, diarrhea, SOB, Chest Pain, fever, chills, or any other symptoms except Cough  He is adamant that he is fine and would like to know when he will  Be discharged  Objective:     Vitals:   Temp (24hrs), Av 6 °F (37 6 °C), Min:97 9 °F (36 6 °C), Max:102 2 °F (39 °C)    Temp:  [97 9 °F (36 6 °C)-102 2 °F (39 °C)] 99 °F (37 2 °C)  HR:  [62-80] 80  Resp:  [18-20] 18  BP: (116-155)/(58-79) 155/79  SpO2:  [92 %-99 %] 92 %  Body mass index is 28 73 kg/m²  Input and Output Summary (last 24 hours): Intake/Output Summary (Last 24 hours) at 2021 0954  Last data filed at 2021 2451  Gross per 24 hour   Intake 200 ml   Output 300 ml   Net -100 ml       Physical Exam:     Physical Exam  Vitals and nursing note reviewed  Constitutional:       General: He is not in acute distress  Appearance: Normal appearance  He is not ill-appearing, toxic-appearing or diaphoretic  HENT:      Head: Normocephalic and atraumatic  Nose: No congestion or rhinorrhea  Eyes:      Conjunctiva/sclera: Conjunctivae normal    Cardiovascular:      Rate and Rhythm: Normal rate and regular rhythm  Pulses: Normal pulses        Heart sounds: Normal heart sounds  No murmur heard  Pulmonary:      Effort: Pulmonary effort is normal  No respiratory distress  Breath sounds: Normal breath sounds  No wheezing  Chest:      Chest wall: No tenderness  Abdominal:      General: Bowel sounds are normal       Palpations: Abdomen is soft  Tenderness: There is no abdominal tenderness  There is no guarding  Musculoskeletal:      Right lower leg: No edema  Left lower leg: No edema  Skin:     General: Skin is warm and dry  Coloration: Skin is not pale  Findings: No erythema or rash  Neurological:      Mental Status: He is alert and oriented to person, place, and time  Sensory: No sensory deficit  Psychiatric:         Mood and Affect: Mood normal          Behavior: Behavior normal          Thought Content: Thought content normal           Additional Data:     Labs:  Results from last 7 days   Lab Units 08/26/21  0714 08/25/21  0008   WBC Thousand/uL 2 89* 3 10*   HEMOGLOBIN g/dL 14 0 13 5   HEMATOCRIT % 42 3 41 5   PLATELETS Thousands/uL 102* 90*   NEUTROS PCT %  --  71   LYMPHS PCT %  --  24   MONOS PCT %  --  5   EOS PCT %  --  0     Results from last 7 days   Lab Units 08/26/21  0715 08/25/21  0825   SODIUM mmol/L 138 136   POTASSIUM mmol/L 3 8 4 1   CHLORIDE mmol/L 103 103   CO2 mmol/L 28 25   BUN mg/dL 13 13   CREATININE mg/dL 0 89 1 03   ANION GAP mmol/L 7 8   CALCIUM mg/dL 8 0* 7 7*   ALBUMIN g/dL  --  2 9*   TOTAL BILIRUBIN mg/dL  --  0 26   ALK PHOS U/L  --  59   ALT U/L  --  56   AST U/L  --  50*   GLUCOSE RANDOM mg/dL 145* 146*     Results from last 7 days   Lab Units 08/25/21  0008   INR  0 98             Results from last 7 days   Lab Units 08/25/21  0212 08/25/21  0210   LACTIC ACID mmol/L 1 0  --    PROCALCITONIN ng/ml  --  0 06       Imaging: No pertinent imaging reviewed      Recent Cultures (last 7 days):     Results from last 7 days   Lab Units 08/25/21  0336 08/25/21  0211   BLOOD CULTURE   --  No Growth at 24 hrs  No Growth at 24 hrs  URINE CULTURE  No Growth <1000 cfu/mL  --        Lines/Drains:  Invasive Devices     Peripheral Intravenous Line            Peripheral IV 08/25/21 Left Antecubital 1 day                Telemetry:   Telemetry Orders (From admission, onward)             48 Hour Telemetry Monitoring  Continuous x 48 hours     Question:  Reason for 48 Hour Telemetry  Answer:  Acute MI, chest pain - R/O MI, or unstable angina                    Last 24 Hours Medication List:   Current Facility-Administered Medications   Medication Dose Route Frequency Provider Last Rate    acetaminophen  650 mg Oral Q6H PRN Tami Gilliland MD      amLODIPine  5 mg Oral Daily Urvashi Cervantes PA-C      aspirin  81 mg Oral Daily Urvashi Cervantes PA-C      atorvastatin  40 mg Oral Daily With [employer], TYRELL      calcium carbonate  500 mg Oral Daily PRN Tami Gilliland MD      enoxaparin  40 mg Subcutaneous Daily Urvashi Cervantes PA-C      guaiFENesin  600 mg Oral Q12H Bella Mcelroy MD      losartan  50 mg Oral Daily Urvashi Cervantes PA-C      ondansetron  4 mg Intravenous Q4H PRN Tami Gilliland MD          Today, Patient Was Seen By: Amada Pantoja DO    ** Please Note: This note has been constructed using a voice recognition system   **

## 2021-08-26 NOTE — ASSESSMENT & PLAN NOTE
· Platelet count 380 on presentation  · In the setting of COVID-19 infection  · Continue to monitor

## 2021-08-26 NOTE — DISCHARGE INSTR - AVS FIRST PAGE

## 2021-08-26 NOTE — ASSESSMENT & PLAN NOTE
Assessment and PLAN  · Presented with confusion and depression, however currently endorses that he knows where he is, does not feel confused, and is reporting that his depression has subsided  · He is AAOX3, denies Homicidal/Suicidal ideation, and is reporting that his mood is normal and he is feeling better     · Says that his previous mental state was due to COVID symptoms and not being able to get his fever under control  · Etiology of patient's confusion still unclear, consider psychological evaluation

## 2021-08-26 NOTE — ASSESSMENT & PLAN NOTE
Diagnosed with COVID-19 on 8/21/21  Presented with confusion, fevers and cough  · Follow CBC and CMP daily    · Self-proning as able  · Monitor respiratory status, supplemental nasal cannula oxygen as needed

## 2021-08-26 NOTE — UTILIZATION REVIEW
Initial Clinical Review    Admission: Date/Time/Statement:   Admission Orders (From admission, onward)     Ordered        08/25/21 0542  Inpatient Admission  Once                   Orders Placed This Encounter   Procedures    Inpatient Admission     covid 19     Standing Status:   Standing     Number of Occurrences:   1     Order Specific Question:   Level of Care     Answer:   Med Surg [16]     Order Specific Question:   Bed request comments     Answer:   covid 23     Order Specific Question:   Estimated length of stay     Answer:   More than 2 Midnights     Order Specific Question:   Certification     Answer:   I certify that inpatient services are medically necessary for this patient for a duration of greater than two midnights  See H&P and MD Progress Notes for additional information about the patient's course of treatment  ED Arrival Information     Expected Arrival Acuity    - 8/24/2021 23:34 Emergent         Means of arrival Escorted by Service Admission type    [facility] Emergency         Arrival complaint    Psych Eval        Chief Complaint   Patient presents with   [facility] Psychiatric Evaluation     Pt reports feeling very depressed  He said nothing makes sense to him  Pt reports he needs help trying to feel better  Pt recently dx with covid said it might play a factor   Fever - 9 weeks to 74 years    Altered Mental Status     Patient denies SI  Patient states he does not feel well mentally and states everything feel like a "dream" and c/o feeling light headed, dizziness, and clustered thoughts  Patient thinks it might be related to [place]  Furthermore, Patient states he feels he is getting worst since last visit to ED 08/23/2021  Initial Presentation: 62 yo male with a PMH of HTN and GERD  to ED from home admitted Inpatient d/t sepsis  Presents with fever 102 2 and confusion with congestion and chest pain  diagnosed with COVID-19 on 08/21  Leukopenia, WBC 3 10  CRP 57 1, D-dimer 1 26, ferritin 1,355, procalcitonin 0 06  CTA chest: No PE  "Bilateral rounded peripheral groundglass infiltrates, most pronounced in the lower lobes bilaterally"  CT head: "Mild cerebral atrophy with chronic small vessel ischemic white matter disease  No acute intracranial abnormality  "UDS negative  Ethanol negative  procal negative  bld cx ordered  Tele  neuro checks  IVF, IV antibiotics  Hold off on further antibiotics  Self-proning  Neuro consult  Date: 8/26   Day 2: Back to his baseline mentation  mood is normal and he is feeling better  Persistent cough  T 99 8/26  d/c IVF  Self-proning      ED Triage Vitals   Temperature Pulse Respirations Blood Pressure SpO2   08/25/21 0004 08/25/21 0004 08/25/21 0004 08/25/21 0004 08/25/21 0004   (!) 102 2 °F (39 °C) 85 20 144/82 97 %      Temp Source Heart Rate Source Patient Position - Orthostatic VS BP Location FiO2 (%)   08/25/21 0004 08/25/21 0004 08/25/21 0004 08/25/21 0004 --   Oral Monitor Sitting Right arm       Pain Score       08/25/21 0010       Med Not Given for Pain - for MAR use only          Wt Readings from Last 1 Encounters:   08/25/21 83 2 kg (183 lb 6 8 oz)     Additional Vital Signs:   08/26/21 0700  99 °F (37 2 °C)  80  18  155/79  108  92 %  None (Room air)  Lying   08/26/21 0254  99 7 °F (37 6 °C)  73  19  144/68  98  --  --  Lying   08/25/21 2217  97 9 °F (36 6 °C)  62  18  146/76  --  93 %  None (Room air)  Lying   08/25/21 2010  98 5 °F (36 9 °C)  65  18  116/58  --  95 %  None (Room air)  Lying   08/25/21 1815  --  78  20  145/79  --  99 %  None (Room air)  Sitting   08/25/21 1809  102 2 °F (39 °C)Abnormal   --  --  --  --  --  --  --   08/25/21 1703  101 1 °F (38 4 °C)Abnormal   --  --  --  --  --  --  --   08/25/21 1545  100 2 °F (37 9 °C)  70  20  134/78  --  93 %  None (Room air)  Sitting   08/25/21 1500  --  80  18  124/77  --  96 %  None (Room air)  Lying   08/25/21 1215  98 5 °F (36 9 °C)  78  20  135/70  --  93 %  None (Room air)  Sitting 08/25/21 0800  101 2 °F (38 4 °C)Abnormal   74  18  138/65  94  94 %  None (Room air)  Lying   08/25/21 0500  --  76  18  153/78  109  95 %  None (Room air)  Lying   08/25/21 0223  --  --  --  --  --  --  None (Room air)  --   08/25/21 0200  --  74  20  138/73  100  94 %  None (Room air)  Lying   08/25/21 0158  --  --  --  --  --  --  --  Lying   08/25/21 0100  100 2 °F (37 9 °C)  76  20  150/80  109  96 %  None (Room air)  Lying   08/25/21 0004  102 2 °F (39 °C)Abnormal   85  20  144/82  --  97 %  None (Room air)  Sitting       Pertinent Labs/Diagnostic Test Results:   Results from last 7 days   Lab Units 08/21/21  1515   SARS-COV-2  Positive*     Results from last 7 days   Lab Units 08/26/21  0714 08/25/21  0008 08/23/21  1546   WBC Thousand/uL 2 89* 3 10* 2 51*   HEMOGLOBIN g/dL 14 0 13 5 14 3   HEMATOCRIT % 42 3 41 5 43 5   PLATELETS Thousands/uL 102* 90* 88*   NEUTROS ABS Thousands/µL  --  2 20 1 68*         Results from last 7 days   Lab Units 08/26/21  0715 08/25/21  0825 08/25/21 0215 08/25/21  0008 08/23/21  1546   SODIUM mmol/L 138 136  --  132* 137   POTASSIUM mmol/L 3 8 4 1  --  4 5 3 5   CHLORIDE mmol/L 103 103  --  99* 103   CO2 mmol/L 28 25  --  27 25   ANION GAP mmol/L 7 8  --  6 9   BUN mg/dL 13 13  --  17 18   CREATININE mg/dL 0 89 1 03  --  1 18 1 19   EGFR ml/min/1 73sq m 110 93  --  79 78   CALCIUM mg/dL 8 0* 7 7*  --  8 3 8 2*   CALCIUM, IONIZED mmol/L  --   --  1 07*  --   --    MAGNESIUM mg/dL  --   --   --  2 1  --      Results from last 7 days   Lab Units 08/25/21  0825 08/25/21  0211 08/25/21  0008 08/23/21  1546   AST U/L 50*  --  54* 34   ALT U/L 56  --  60 48   ALK PHOS U/L 59  --  71 73   TOTAL PROTEIN g/dL 6 4  --  7 4 7 2   ALBUMIN g/dL 2 9*  --  3 5 3 6   TOTAL BILIRUBIN mg/dL 0 26  --  0 39 0 36   AMMONIA umol/L  --  17  --   --          Results from last 7 days   Lab Units 08/26/21  0715 08/25/21  0825 08/25/21  0008 08/23/21  1546   GLUCOSE RANDOM mg/dL 145* 146* 180* 312* Results from last 7 days   Lab Units 08/25/21  0008   CK TOTAL U/L 212   CK MB INDEX % <1 0   CK MB ng/mL 0 1     Results from last 7 days   Lab Units 08/25/21  1559 08/25/21  1110 08/25/21  0826 08/23/21  1546   TROPONIN I ng/mL <0 02 <0 02 <0 02 <0 02     Results from last 7 days   Lab Units 08/25/21  0008   D-DIMER QUANTITATIVE ug/ml FEU 1 26*     Results from last 7 days   Lab Units 08/25/21  0008 08/23/21  1546   PROTIME seconds 13 1 13 1   INR  0 98 0 98   PTT seconds  --  32     Results from last 7 days   Lab Units 08/25/21  0008   TSH 3RD GENERATON uIU/mL 0 966     Results from last 7 days   Lab Units 08/25/21  0210   PROCALCITONIN ng/ml 0 06     Results from last 7 days   Lab Units 08/25/21  0212   LACTIC ACID mmol/L 1 0       Results from last 7 days   Lab Units 08/25/21  0008 08/23/21  1546   NT-PRO BNP pg/mL 51 26     Results from last 7 days   Lab Units 08/25/21  0008   FERRITIN ng/mL 1,355*       Results from last 7 days   Lab Units 08/25/21  0008 08/23/21  1546   CRP mg/L 57 1* 35 5*         Results from last 7 days   Lab Units 08/25/21  0217   CLARITY UA  Clear   COLOR UA  Rut   SPEC GRAV UA  1 025   PH UA  5 5   GLUCOSE UA mg/dl Negative   KETONES UA mg/dl Negative   BLOOD UA  Negative   PROTEIN UA mg/dl 100 (2+)*   NITRITE UA  Negative   BILIRUBIN UA  Negative   UROBILINOGEN UA E U /dl 0 2   LEUKOCYTES UA  Negative   WBC UA /hpf 2-4   RBC UA /hpf None Seen   BACTERIA UA /hpf None Seen   EPITHELIAL CELLS WET PREP /hpf Occasional   MUCUS THREADS  Moderate*     Results from last 7 days   Lab Units 08/21/21  1515   INFLUENZA A PCR  Negative   INFLUENZA B PCR  Negative   RSV PCR  Negative         Results from last 7 days   Lab Units 08/25/21  0214   AMPH/METH  Negative   BARBITURATE UR  Negative   BENZODIAZEPINE UR  Negative   COCAINE UR  Negative   METHADONE URINE  Negative   OPIATE UR  Negative   PCP UR  Negative   THC UR  Negative     Results from last 7 days   Lab Units 08/25/21  0211 08/25/21  0210   ETHANOL LVL mg/dL <3  --    ACETAMINOPHEN LVL ug/mL  --  5 3*   SALICYLATE LVL mg/dL  --  <3*       Results from last 7 days   Lab Units 08/25/21  0211   BLOOD CULTURE  No Growth at 24 hrs  No Growth at 24 hrs      8/25 cxr:Bilateral pulmonary alveolar groundglass opacities consistent with Covid 19 pneumonia    8/25 CTA chest:1   No CT evidence of pulmonary embolus        2   Bilateral rounded peripheral groundglass infiltrates, most pronounced in the lower lobes bilaterally   Findings are most compatible with multifocal/multi lobar pneumonia, Covid type   In the setting of clinically suspected/proven COVID-19, the above   lung parenchymal findings on CT indicate high confidence level for COVID-19          8/25 CT head:Mild cerebral atrophy with chronic small vessel ischemic white matter disease        No acute intracranial abnormality        8/25 ekg:  Previous ECG:  Compared to current     Comparison ECG info:  8/23/21     Similarity: Man noted (no nonspecific ST and T wave changes now)   Quality:     Tracing quality:  Limited by artifact   Rate:     ECG rate:  78     ECG rate assessment: normal     Rhythm:     Rhythm: sinus rhythm     Ectopy:     Ectopy: none     QRS:     QRS axis:  Normal     QRS intervals:  Normal   Conduction:     Conduction: normal     ST segments:     ST segments:  Normal   T waves:     T waves: normal      8/25 ekg:  Normal sinus rhythm  Normal ECG  When compared with ECG of 23-AUG-2021 15:25, (unconfirmed)  No significant change was found  Confirmed by Hilary Caldwell ([id number]) on 8/25/2021 6:26:28 PM    ED Treatment:   Medication Administration from 08/24/2021 1254 to 08/25/2021 2136       Date/Time Order Dose Route Action Action by Comments     08/25/2021 0010 acetaminophen (TYLENOL) tablet 650 mg 650 mg Oral Given                  08/25/2021 0241 sodium chloride 0 9 % bolus 1,000 mL 1,000 mL Intravenous New Bag                  08/25/2021 0241 sodium chloride 0 9 % infusion 125 mL/hr Intravenous New Bag                             08/25/2021 0507 ceftriaxone (ROCEPHIN) 1 g/50 mL in dextrose IVPB 1,000 mg Intravenous New Bag       08/25/2021 0507 doxycycline hyclate (VIBRAMYCIN) capsule 100 mg 100 mg Oral Given       08/25/2021 0855 amLODIPine (NORVASC) tablet 5 mg 5 mg Oral Given       08/25/2021 0855 aspirin (ECOTRIN LOW STRENGTH) EC tablet 81 mg 81 mg Oral Given       08/25/2021 1602 atorvastatin (LIPITOR) tablet 40 mg 40 mg Oral Given       08/25/2021 0855 losartan (COZAAR) tablet 50 mg 50 mg Oral Given       08/25/2021 0855 enoxaparin (LOVENOX) subcutaneous injection 40 mg 40 mg Subcutaneous Given       08/25/2021 1601 acetaminophen (TYLENOL) tablet 650 mg 650 mg Oral Given       08/25/2021 0856 acetaminophen (TYLENOL) tablet 650 mg 650 mg Oral Given       08/25/2021 0856 calcium carbonate (TUMS) chewable tablet 500 mg 500 mg Oral Given       08/25/2021 2013 guaiFENesin (MUCINEX) 12 hr tablet 600 mg 600 mg Oral Given       08/25/2021 1446 guaiFENesin (MUCINEX) 12 hr tablet 600 mg 600 mg Oral Given       08/25/2021 1837 ibuprofen (MOTRIN) tablet 600 mg 600 mg Oral Given          Past Medical History:   Diagnosis Date    Chest pain     Diverticulitis     GERD (gastroesophageal reflux disease)     HTN (hypertension)     Pain in left wrist      Present on Admission:   Hypertension   Hypercholesterolemia   Chest pain-rule out ACS      Admitting Diagnosis: Delirium [R41 0]  Depression [F32 9]  Fever [R50 9]  Sepsis ([address] ) [A41 9]  Encounter for psychological evaluation [Z00 8]  AMS (altered mental status) [R41 82]  Pneumonia due to COVID-19 virus [U07 1, J12 82]  Age/Sex: 61 y o  male  Admission Orders:  Scheduled Medications:  amLODIPine, 5 mg, Oral, Daily  aspirin, 81 mg, Oral, Daily  atorvastatin, 40 mg, Oral, Daily With Dinner  enoxaparin, 40 mg, Subcutaneous, Daily  guaiFENesin, 600 mg, Oral, Q12H [facility]  losartan, 50 mg, Oral, Daily    sodium chloride 0 9 % infusion Rate: 125 mL/hr Dose: 125 mL/hr  Freq: Continuous Route: IV  Indications of Use: IV Hydration  Last Dose: Stopped (08/25/21 0812)  Start: 08/25/21 0230 End: 08/25/21 0801    PRN Meds:  acetaminophen, 650 mg, Oral, Q6H PRN 8/25 X 2  calcium carbonate, 500 mg, Oral, Daily PRN 8/25 X 2  ondansetron, 4 mg, Intravenous, Q4H PRN      I&O  CARDIAC DIET  AMBULATE  TELE  CONTACT AND DROPLET ISOLATION  IS      Network Utilization Review Department  ATTENTION: Please call with any questions or concerns to [phone] and carefully listen to the prompts so that you are directed to the right person  All voicemails are confidential   Billy Tuttle all requests for admission clinical reviews, approved or denied determinations and any other requests to dedicated fax number below belonging to the campus where the patient is receiving treatment   List of dedicated fax numbers for the Facilities:  [address] DENIALS (Administrative/Medical Necessity) [phone]   [address] (Maternity/NICU/Pediatrics) [phone]   [address] [place] [address]  [address] [address] [place] [address] [address] [facility] [place] [address] [phone]

## 2021-08-26 NOTE — NURSING NOTE
Patient left AMA  [facility] doctors made aware  DischArge paperwork given and nurse went over instructions and infortmation on covid virus and quarantine  Patient walked out with son to car

## 2021-08-26 NOTE — UTILIZATION REVIEW
Inpatient Admission Authorization Request   NOTIFICATION OF INPATIENT ADMISSION/INPATIENT AUTHORIZATION REQUEST   [facility]  Tax ID: [id number]  NPI: [id number]  Place of Service: [facility]patient [facility]  [address]  Place of Service Code: 24     ATTENDING PROVIDER:  Attending Name and NPI#: George Cifuentes Md [[id number]]  Address: [address]  Phone: [phone]     UTILIZATION REVIEW CONTACT:  [place] Utilization   Network Utilization Review Department  Phone: [phone]  Fax: [phone]  Email: [email or web address]     PHYSICIAN ADVISORY SERVICES:  FOR [id number] REVIEW - MEDICAL NECESSITY DENIAL  Phone: [phone]  Fax: [phone]  Email: [email or web address]     TYPE OF REQUEST:  Inpatient Status     ADMISSION INFORMATION:  ADMISSION DATE/TIME: 8/25/21  5:42 AM  PATIENT DIAGNOSIS CODE/DESCRIPTION:  Delirium [R41 0]  Depression [F32 9]  Fever [R50 9]  Sepsis ([facility] [address] ) [A41 9]  Encounter for psychological evaluation [Z00 8]  AMS (altered mental status) [R41 82]  Pneumonia due to COVID-19 virus [U07 1, J12 82]  DISCHARGE DATE/TIME: No discharge date for patient encounter  DISCHARGE DISPOSITION (IF DISCHARGED): Home/Self Care     IMPORTANT INFORMATION:  Please contact the [place] [facility] directly with any questions or concerns regarding this request  Department voicemails are confidential     Send requests for admission clinical reviews, concurrent reviews, approvals, and administrative denials due to lack of clinical to fax [phone]

## 2021-08-26 NOTE — PLAN OF CARE
Problem: PAIN - ADULT  Goal: Verbalizes/displays adequate comfort level or baseline comfort level  Description: Interventions:  - Encourage patient to monitor pain and request assistance  - Assess pain using appropriate pain scale  - Administer analgesics based on type and severity of pain and evaluate response  - Implement non-pharmacological measures as appropriate and evaluate response  - Consider cultural and social influences on pain and pain management  - Notify physician/advanced practitioner if interventions unsuccessful or patient reports new pain  8/26/2021 1206 by Nora Eason RN  Outcome: Completed  8/26/2021 0930 by Nora Eason RN  Outcome: Progressing     Problem: INFECTION - ADULT  Goal: Absence or prevention of progression during hospitalization  Description: INTERVENTIONS:  - Assess and monitor for signs and symptoms of infection  - Monitor lab/diagnostic results  - Monitor all insertion sites, i e  indwelling lines, tubes, and drains  - Monitor endotracheal if appropriate and nasal secretions for changes in amount and color  - [place] appropriate cooling/warming therapies per order  - Administer medications as ordered  - Instruct and encourage patient and family to use good hand hygiene technique  - Identify and instruct in appropriate isolation precautions for identified infection/condition  8/26/2021 1206 by Nora Eason RN  Outcome: Completed  8/26/2021 0930 by Nora Eason RN  Outcome: Progressing  Goal: Absence of fever/infection during neutropenic period  Description: INTERVENTIONS:  - Monitor WBC    Outcome: Completed     Problem: SAFETY ADULT  Goal: Patient will remain free of falls  Description: INTERVENTIONS:  - Educate patient/family on patient safety including physical limitations  - Instruct patient to call for assistance with activity   - Consult OT/PT to assist with strengthening/mobility   - Keep Call bell within reach  - Keep bed low and locked with side rails adjusted as appropriate  - Keep care items and personal belongings within reach  - Initiate and maintain comfort rounds  - Make Fall Risk Sign visible to staff  - Offer Toileting every  Hours, in advance of need  - Initiate/Maintain alarm  - Obtain necessary fall risk management equipment:   - Apply yellow socks and bracelet for high fall risk patients  - Consider moving patient to room near nurses station  Outcome: Completed  Goal: Maintain or return to baseline ADL function  Description: INTERVENTIONS:  -  Assess patient's ability to carry out ADLs; assess patient's baseline for ADL function and identify physical deficits which impact ability to perform ADLs (bathing, care of mouth/teeth, toileting, grooming, dressing, etc )  - Assess/evaluate cause of self-care deficits   - Assess range of motion  - Assess patient's mobility; develop plan if impaired  - Assess patient's need for assistive devices and provide as appropriate  - Encourage maximum independence but intervene and supervise when necessary  - Involve family in performance of ADLs  - Assess for home care needs following discharge   - Consider OT consult to assist with ADL evaluation and planning for discharge  - Provide patient education as appropriate  Outcome: Completed  Goal: Maintains/Returns to pre admission functional level  Description: INTERVENTIONS:  - Perform BMAT or MOVE assessment daily    - Set and communicate daily mobility goal to care team and patient/family/caregiver  - Collaborate with rehabilitation services on mobility goals if consulted  - Perform Range of Motion  times a day  - Reposition patient every  hours    - Dangle patient  times a day  - Stand patient  times a day  - Ambulate patient  times a day  - Out of bed to chair  times a day   - Out of bed for meals  times a day  - Out of bed for toileting  - Record patient progress and toleration of activity level   Outcome: Completed     Problem: DISCHARGE PLANNING  Goal: Discharge to home or other facility with appropriate resources  Description: INTERVENTIONS:  - Identify barriers to discharge w/patient and caregiver  - Arrange for needed discharge resources and transportation as appropriate  - Identify discharge learning needs (meds, wound care, etc )  - Arrange for interpretive services to assist at discharge as needed  - Refer to Case Management Department for coordinating discharge planning if the patient needs post-hospital services based on physician/advanced practitioner order or complex needs related to functional status, cognitive ability, or social support system  8/26/2021 1206 by Lavern Yepez RN  Outcome: Completed  8/26/2021 0930 by Lavern Yepez RN  Outcome: Progressing     Problem: Knowledge Deficit  Goal: Patient/family/caregiver demonstrates understanding of disease process, treatment plan, medications, and discharge instructions  Description: Complete learning assessment and assess knowledge base  Interventions:  - Provide teaching at level of understanding  - Provide teaching via preferred learning methods  8/26/2021 1206 by Lavern Yepez RN  Outcome: Completed  8/26/2021 0930 by Lavern Yepez RN  Outcome: Progressing     Problem: Nutrition/Hydration-ADULT  Goal: Nutrient/Hydration intake appropriate for improving, restoring or maintaining nutritional needs  Description: Monitor and assess patient's nutrition/hydration status for malnutrition  Collaborate with interdisciplinary team and initiate plan and interventions as ordered  Monitor patient's weight and dietary intake as ordered or per policy  Utilize nutrition screening tool and intervene as necessary  Determine patient's food preferences and provide high-protein, high-caloric foods as appropriate       INTERVENTIONS:  - Monitor oral intake, urinary output, labs, and treatment plans  - Assess nutrition and hydration status and recommend course of action  - Evaluate amount of meals eaten  - Assist patient with eating if necessary   - Allow adequate time for meals  - Recommend/ encourage appropriate diets, oral nutritional supplements, and vitamin/mineral supplements  - Order, calculate, and assess calorie counts as needed  - Recommend, monitor, and adjust tube feedings and TPN/PPN based on assessed needs  - Assess need for intravenous fluids  - Provide specific nutrition/hydration education as appropriate  - Include patient/family/caregiver in decisions related to nutrition  8/26/2021 1206 by Anupama Adame, RN  Outcome: Completed  8/26/2021 0930 by Anupama Adame, RN  Outcome: Progressing

## 2021-08-26 NOTE — PLAN OF CARE
Problem: PAIN - ADULT  Goal: Verbalizes/displays adequate comfort level or baseline comfort level  Description: Interventions:  - Encourage patient to monitor pain and request assistance  - Assess pain using appropriate pain scale  - Administer analgesics based on type and severity of pain and evaluate response  - Implement non-pharmacological measures as appropriate and evaluate response  - Consider cultural and social influences on pain and pain management  - Notify physician/advanced practitioner if interventions unsuccessful or patient reports new pain  Outcome: Progressing     Problem: INFECTION - ADULT  Goal: Absence or prevention of progression during hospitalization  Description: INTERVENTIONS:  - Assess and monitor for signs and symptoms of infection  - Monitor lab/diagnostic results  - Monitor all insertion sites, i e  indwelling lines, tubes, and drains  - Monitor endotracheal if appropriate and nasal secretions for changes in amount and color  - [place] appropriate cooling/warming therapies per order  - Administer medications as ordered  - Instruct and encourage patient and family to use good hand hygiene technique  - Identify and instruct in appropriate isolation precautions for identified infection/condition  Outcome: Progressing  Goal: Absence of fever/infection during neutropenic period  Description: INTERVENTIONS:  - Monitor WBC    Outcome: Progressing     Problem: SAFETY ADULT  Goal: Patient will remain free of falls  Description: INTERVENTIONS:  - Educate patient/family on patient safety including physical limitations  - Instruct patient to call for assistance with activity   - Consult OT/PT to assist with strengthening/mobility   - Keep Call bell within reach  - Keep bed low and locked with side rails adjusted as appropriate  - Keep care items and personal belongings within reach  - Initiate and maintain comfort rounds  - Make Fall Risk Sign visible to staff  - Apply yellow socks and bracelet for high fall risk patients  - Consider moving patient to room near nurses station  Outcome: Progressing  Goal: Maintain or return to baseline ADL function  Description: INTERVENTIONS:  -  Assess patient's ability to carry out ADLs; assess patient's baseline for ADL function and identify physical deficits which impact ability to perform ADLs (bathing, care of mouth/teeth, toileting, grooming, dressing, etc )  - Assess/evaluate cause of self-care deficits   - Assess range of motion  - Assess patient's mobility; develop plan if impaired  - Assess patient's need for assistive devices and provide as appropriate  - Encourage maximum independence but intervene and supervise when necessary  - Involve family in performance of ADLs  - Assess for home care needs following discharge   - Consider OT consult to assist with ADL evaluation and planning for discharge  - Provide patient education as appropriate  Outcome: Progressing  Goal: Maintains/Returns to pre admission functional level  Description: INTERVENTIONS:  - Perform BMAT or MOVE assessment daily    - Set and communicate daily mobility goal to care team and patient/family/caregiver     - Collaborate with rehabilitation services on mobility goals if consulted  - Out of bed for toileting  - Record patient progress and toleration of activity level   Outcome: Progressing     Problem: DISCHARGE PLANNING  Goal: Discharge to home or other facility with appropriate resources  Description: INTERVENTIONS:  - Identify barriers to discharge w/patient and caregiver  - Arrange for needed discharge resources and transportation as appropriate  - Identify discharge learning needs (meds, wound care, etc )  - Arrange for interpretive services to assist at discharge as needed  - Refer to Case Management Department for coordinating discharge planning if the patient needs post-hospital services based on physician/advanced practitioner order or complex needs related to functional status, cognitive ability, or social support system  Outcome: Progressing     Problem: Knowledge Deficit  Goal: Patient/family/caregiver demonstrates understanding of disease process, treatment plan, medications, and discharge instructions  Description: Complete learning assessment and assess knowledge base  Interventions:  - Provide teaching at level of understanding  - Provide teaching via preferred learning methods  Outcome: Progressing     Problem: Nutrition/Hydration-ADULT  Goal: Nutrient/Hydration intake appropriate for improving, restoring or maintaining nutritional needs  Description: Monitor and assess patient's nutrition/hydration status for malnutrition  Collaborate with interdisciplinary team and initiate plan and interventions as ordered  Monitor patient's weight and dietary intake as ordered or per policy  Utilize nutrition screening tool and intervene as necessary  Determine patient's food preferences and provide high-protein, high-caloric foods as appropriate       INTERVENTIONS:  - Monitor oral intake, urinary output, labs, and treatment plans  - Assess nutrition and hydration status and recommend course of action  - Evaluate amount of meals eaten  - Assist patient with eating if necessary   - Allow adequate time for meals  - Recommend/ encourage appropriate diets, oral nutritional supplements, and vitamin/mineral supplements  - Order, calculate, and assess calorie counts as needed  - Recommend, monitor, and adjust tube feedings and TPN/PPN based on assessed needs  - Assess need for intravenous fluids  - Provide specific nutrition/hydration education as appropriate  - Include patient/family/caregiver in decisions related to nutrition  Outcome: Progressing

## 2021-08-30 LAB
BACTERIA BLD CULT: NORMAL
BACTERIA BLD CULT: NORMAL

## 2021-08-31 ENCOUNTER — APPOINTMENT (EMERGENCY)
Dept: RADIOLOGY | Facility: HOSPITAL | Age: 56
DRG: 137 | End: 2021-08-31
Payer: COMMERCIAL

## 2021-08-31 ENCOUNTER — HOSPITAL ENCOUNTER (INPATIENT)
Facility: HOSPITAL | Age: 56
LOS: 4 days | Discharge: HOME | DRG: 137 | End: 2021-09-04
Attending: EMERGENCY MEDICINE | Admitting: INTERNAL MEDICINE
Payer: COMMERCIAL

## 2021-08-31 DIAGNOSIS — R74.01 TRANSAMINITIS: ICD-10-CM

## 2021-08-31 DIAGNOSIS — U07.1 PNEUMONIA DUE TO COVID-19 VIRUS: Primary | ICD-10-CM

## 2021-08-31 DIAGNOSIS — R09.02 HYPOXIA: ICD-10-CM

## 2021-08-31 DIAGNOSIS — J12.82 PNEUMONIA DUE TO COVID-19 VIRUS: Primary | ICD-10-CM

## 2021-08-31 DIAGNOSIS — J96.00 ACUTE RESPIRATORY FAILURE DUE TO COVID-19 (HCC): ICD-10-CM

## 2021-08-31 DIAGNOSIS — U07.1 ACUTE RESPIRATORY FAILURE DUE TO COVID-19 (HCC): ICD-10-CM

## 2021-08-31 PROBLEM — E11.9 TYPE 2 DIABETES MELLITUS (HCC): Status: ACTIVE | Noted: 2021-08-31

## 2021-08-31 PROBLEM — E87.1 HYPONATREMIA: Status: ACTIVE | Noted: 2021-08-31

## 2021-08-31 LAB
ANION GAP SERPL CALCULATED.3IONS-SCNC: 4 MMOL/L (ref 4–13)
ARTIFACT: PRESENT
BASOPHILS # BLD MANUAL: 0 THOUSAND/UL (ref 0–0.1)
BASOPHILS NFR MAR MANUAL: 0 % (ref 0–1)
BUN SERPL-MCNC: 15 MG/DL (ref 5–25)
CALCIUM SERPL-MCNC: 8.3 MG/DL (ref 8.3–10.1)
CHLORIDE SERPL-SCNC: 98 MMOL/L (ref 100–108)
CK SERPL-CCNC: 142 U/L (ref 39–308)
CO2 SERPL-SCNC: 28 MMOL/L (ref 21–32)
CREAT SERPL-MCNC: 0.89 MG/DL (ref 0.6–1.3)
CRP SERPL QL: 258 MG/L
EOSINOPHIL # BLD MANUAL: 0 THOUSAND/UL (ref 0–0.4)
EOSINOPHIL NFR BLD MANUAL: 0 % (ref 0–6)
ERYTHROCYTE [DISTWIDTH] IN BLOOD BY AUTOMATED COUNT: 12.8 % (ref 11.6–15.1)
GFR SERPL CREATININE-BSD FRML MDRD: 110 ML/MIN/1.73SQ M
GLUCOSE SERPL-MCNC: 207 MG/DL (ref 65–140)
GLUCOSE SERPL-MCNC: 211 MG/DL (ref 65–140)
GLUCOSE SERPL-MCNC: 214 MG/DL (ref 65–140)
HCT VFR BLD AUTO: 37.3 % (ref 36.5–49.3)
HGB BLD-MCNC: 12.4 G/DL (ref 12–17)
LYMPHOCYTES # BLD AUTO: 0.73 THOUSAND/UL (ref 0.6–4.47)
LYMPHOCYTES # BLD AUTO: 11 % (ref 14–44)
MCH RBC QN AUTO: 28.6 PG (ref 26.8–34.3)
MCHC RBC AUTO-ENTMCNC: 33.2 G/DL (ref 31.4–37.4)
MCV RBC AUTO: 86 FL (ref 82–98)
MONOCYTES # BLD AUTO: 0.27 THOUSAND/UL (ref 0–1.22)
MONOCYTES NFR BLD: 4 % (ref 4–12)
NEUTROPHILS # BLD MANUAL: 5.66 THOUSAND/UL (ref 1.85–7.62)
NEUTS SEG NFR BLD AUTO: 85 % (ref 43–75)
NT-PROBNP SERPL-MCNC: 61 PG/ML
PLATELET # BLD AUTO: 296 THOUSANDS/UL (ref 149–390)
PLATELET BLD QL SMEAR: ADEQUATE
PMV BLD AUTO: 9.8 FL (ref 8.9–12.7)
POTASSIUM SERPL-SCNC: 4 MMOL/L (ref 3.5–5.3)
RBC # BLD AUTO: 4.33 MILLION/UL (ref 3.88–5.62)
SODIUM SERPL-SCNC: 130 MMOL/L (ref 136–145)
TROPONIN I SERPL-MCNC: <0.02 NG/ML
WBC # BLD AUTO: 6.66 THOUSAND/UL (ref 4.31–10.16)

## 2021-08-31 PROCEDURE — 83880 ASSAY OF NATRIURETIC PEPTIDE: CPT | Performed by: INTERNAL MEDICINE

## 2021-08-31 PROCEDURE — 96360 HYDRATION IV INFUSION INIT: CPT

## 2021-08-31 PROCEDURE — 82948 REAGENT STRIP/BLOOD GLUCOSE: CPT

## 2021-08-31 PROCEDURE — 99291 CRITICAL CARE FIRST HOUR: CPT | Performed by: EMERGENCY MEDICINE

## 2021-08-31 PROCEDURE — 99291 CRITICAL CARE FIRST HOUR: CPT

## 2021-08-31 PROCEDURE — 36415 COLL VENOUS BLD VENIPUNCTURE: CPT

## 2021-08-31 PROCEDURE — 80048 BASIC METABOLIC PNL TOTAL CA: CPT

## 2021-08-31 PROCEDURE — 71045 X-RAY EXAM CHEST 1 VIEW: CPT

## 2021-08-31 PROCEDURE — 85027 COMPLETE CBC AUTOMATED: CPT

## 2021-08-31 PROCEDURE — 86140 C-REACTIVE PROTEIN: CPT | Performed by: INTERNAL MEDICINE

## 2021-08-31 PROCEDURE — 84484 ASSAY OF TROPONIN QUANT: CPT | Performed by: INTERNAL MEDICINE

## 2021-08-31 PROCEDURE — 82550 ASSAY OF CK (CPK): CPT | Performed by: INTERNAL MEDICINE

## 2021-08-31 PROCEDURE — 85007 BL SMEAR W/DIFF WBC COUNT: CPT

## 2021-08-31 PROCEDURE — 99223 1ST HOSP IP/OBS HIGH 75: CPT | Performed by: INTERNAL MEDICINE

## 2021-08-31 RX ORDER — ACETAMINOPHEN 325 MG/1
650 TABLET ORAL EVERY 6 HOURS PRN
Status: DISCONTINUED | OUTPATIENT
Start: 2021-08-31 | End: 2021-09-01

## 2021-08-31 RX ORDER — DEXAMETHASONE SODIUM PHOSPHATE 4 MG/ML
6 INJECTION, SOLUTION INTRA-ARTICULAR; INTRALESIONAL; INTRAMUSCULAR; INTRAVENOUS; SOFT TISSUE EVERY 24 HOURS
Status: DISCONTINUED | OUTPATIENT
Start: 2021-08-31 | End: 2021-09-04 | Stop reason: HOSPADM

## 2021-08-31 RX ORDER — MELATONIN
2000 DAILY
Status: DISCONTINUED | OUTPATIENT
Start: 2021-09-01 | End: 2021-09-04 | Stop reason: HOSPADM

## 2021-08-31 RX ORDER — GUAIFENESIN/DEXTROMETHORPHAN 100-10MG/5
10 SYRUP ORAL EVERY 4 HOURS PRN
Status: DISCONTINUED | OUTPATIENT
Start: 2021-08-31 | End: 2021-09-02

## 2021-08-31 RX ORDER — ATORVASTATIN CALCIUM 40 MG/1
40 TABLET, FILM COATED ORAL
Status: DISCONTINUED | OUTPATIENT
Start: 2021-08-31 | End: 2021-09-04 | Stop reason: HOSPADM

## 2021-08-31 RX ORDER — ASPIRIN 81 MG/1
81 TABLET ORAL DAILY
Status: DISCONTINUED | OUTPATIENT
Start: 2021-09-01 | End: 2021-09-04 | Stop reason: HOSPADM

## 2021-08-31 RX ORDER — LOSARTAN POTASSIUM 50 MG/1
50 TABLET ORAL DAILY
Status: DISCONTINUED | OUTPATIENT
Start: 2021-09-01 | End: 2021-09-04 | Stop reason: HOSPADM

## 2021-08-31 RX ORDER — INSULIN GLARGINE 100 [IU]/ML
10 INJECTION, SOLUTION SUBCUTANEOUS
Status: DISCONTINUED | OUTPATIENT
Start: 2021-08-31 | End: 2021-09-01

## 2021-08-31 RX ORDER — AMLODIPINE BESYLATE 5 MG/1
5 TABLET ORAL DAILY
Status: DISCONTINUED | OUTPATIENT
Start: 2021-09-01 | End: 2021-09-04 | Stop reason: HOSPADM

## 2021-08-31 RX ADMIN — ENOXAPARIN SODIUM 80 MG: 80 INJECTION SUBCUTANEOUS at 17:46

## 2021-08-31 RX ADMIN — GUAIFENESIN AND DEXTROMETHORPHAN 10 ML: 100; 10 SYRUP ORAL at 21:19

## 2021-08-31 RX ADMIN — SODIUM CHLORIDE 1000 ML: 0.9 INJECTION, SOLUTION INTRAVENOUS at 11:42

## 2021-08-31 RX ADMIN — DEXAMETHASONE SODIUM PHOSPHATE 6 MG: 4 INJECTION INTRA-ARTICULAR; INTRALESIONAL; INTRAMUSCULAR; INTRAVENOUS; SOFT TISSUE at 17:46

## 2021-08-31 RX ADMIN — REMDESIVIR 200 MG: 100 INJECTION, POWDER, LYOPHILIZED, FOR SOLUTION INTRAVENOUS at 17:46

## 2021-08-31 RX ADMIN — ATORVASTATIN CALCIUM 40 MG: 40 TABLET, FILM COATED ORAL at 17:46

## 2021-08-31 RX ADMIN — INSULIN GLARGINE 10 UNITS: 100 INJECTION, SOLUTION SUBCUTANEOUS at 21:19

## 2021-08-31 RX ADMIN — INSULIN LISPRO 2 UNITS: 100 INJECTION, SOLUTION INTRAVENOUS; SUBCUTANEOUS at 17:47

## 2021-08-31 NOTE — PLAN OF CARE
Problem: Potential for Falls  Goal: Patient will remain free of falls  Description: INTERVENTIONS:  - Educate patient/family on patient safety including physical limitations  - Instruct patient to call for assistance with activity   - Consult OT/PT to assist with strengthening/mobility   - Keep Call bell within reach  - Keep bed low and locked with side rails adjusted as appropriate  - Keep care items and personal belongings within reach  - Initiate and maintain comfort rounds  - Make Fall Risk Sign visible to staff  - Offer Toileting every 2 Hours, in advance of need  - Initiate/Maintain alarm  - Obtain necessary fall risk management equipment:   - Apply yellow socks and bracelet for high fall risk patients  - Consider moving patient to room near nurses station  Outcome: Progressing     Problem: PAIN - ADULT  Goal: Verbalizes/displays adequate comfort level or baseline comfort level  Description: Interventions:  - Encourage patient to monitor pain and request assistance  - Assess pain using appropriate pain scale  - Administer analgesics based on type and severity of pain and evaluate response  - Implement non-pharmacological measures as appropriate and evaluate response  - Consider cultural and social influences on pain and pain management  - Notify physician/advanced practitioner if interventions unsuccessful or patient reports new pain  Outcome: Progressing     Problem: INFECTION - ADULT  Goal: Absence or prevention of progression during hospitalization  Description: INTERVENTIONS:  - Assess and monitor for signs and symptoms of infection  - Monitor lab/diagnostic results  - Monitor all insertion sites, i e  indwelling lines, tubes, and drains  - Monitor endotracheal if appropriate and nasal secretions for changes in amount and color  - [place] appropriate cooling/warming therapies per order  - Administer medications as ordered  - Instruct and encourage patient and family to use good hand hygiene technique  - Identify and instruct in appropriate isolation precautions for identified infection/condition  Outcome: Progressing  Goal: Absence of fever/infection during neutropenic period  Description: INTERVENTIONS:  - Monitor WBC    Outcome: Progressing     Problem: SAFETY ADULT  Goal: Patient will remain free of falls  Description: INTERVENTIONS:  - Educate patient/family on patient safety including physical limitations  - Instruct patient to call for assistance with activity   - Consult OT/PT to assist with strengthening/mobility   - Keep Call bell within reach  - Keep bed low and locked with side rails adjusted as appropriate  - Keep care items and personal belongings within reach  - Initiate and maintain comfort rounds  - Make Fall Risk Sign visible to staff  - Offer Toileting every 2 Hours, in advance of need  - Initiate/Maintain alarm  - Obtain necessary fall risk management equipment:   - Apply yellow socks and bracelet for high fall risk patients  - Consider moving patient to room near nurses station  Outcome: Progressing  Goal: Maintain or return to baseline ADL function  Description: INTERVENTIONS:  -  Assess patient's ability to carry out ADLs; assess patient's baseline for ADL function and identify physical deficits which impact ability to perform ADLs (bathing, care of mouth/teeth, toileting, grooming, dressing, etc )  - Assess/evaluate cause of self-care deficits   - Assess range of motion  - Assess patient's mobility; develop plan if impaired  - Assess patient's need for assistive devices and provide as appropriate  - Encourage maximum independence but intervene and supervise when necessary  - Involve family in performance of ADLs  - Assess for home care needs following discharge   - Consider OT consult to assist with ADL evaluation and planning for discharge  - Provide patient education as appropriate  Outcome: Progressing  Goal: Maintains/Returns to pre admission functional level  Description: INTERVENTIONS:  - Perform BMAT or MOVE assessment daily    - Set and communicate daily mobility goal to care team and patient/family/caregiver  - Collaborate with rehabilitation services on mobility goals if consulted  - Perform Range of Motion 3 times a day  - Reposition patient every 2 hours  - Dangle patient 3 times a day  - Stand patient 3 times a day  - Ambulate patient 3 times a day  - Out of bed to chair 3 times a day   - Out of bed for meals 3 times a day  - Out of bed for toileting  - Record patient progress and toleration of activity level   Outcome: Progressing     Problem: DISCHARGE PLANNING  Goal: Discharge to home or other facility with appropriate resources  Description: INTERVENTIONS:  - Identify barriers to discharge w/patient and caregiver  - Arrange for needed discharge resources and transportation as appropriate  - Identify discharge learning needs (meds, wound care, etc )  - Arrange for interpretive services to assist at discharge as needed  - Refer to Case Management Department for coordinating discharge planning if the patient needs post-hospital services based on physician/advanced practitioner order or complex needs related to functional status, cognitive ability, or social support system  Outcome: Progressing     Problem: Knowledge Deficit  Goal: Patient/family/caregiver demonstrates understanding of disease process, treatment plan, medications, and discharge instructions  Description: Complete learning assessment and assess knowledge base    Interventions:  - Provide teaching at level of understanding  - Provide teaching via preferred learning methods  Outcome: Progressing     Problem: RESPIRATORY - ADULT  Goal: Achieves optimal ventilation and oxygenation  Description: INTERVENTIONS:  - Assess for changes in respiratory status  - Assess for changes in mentation and behavior  - Position to facilitate oxygenation and minimize respiratory effort  - Oxygen administered by appropriate delivery if ordered  - Initiate smoking cessation education as indicated  - Encourage broncho-pulmonary hygiene including cough, deep breathe, Incentive Spirometry  - Assess the need for suctioning and aspirate as needed  - Assess and instruct to report SOB or any respiratory difficulty  - Respiratory Therapy support as indicated  Outcome: Progressing

## 2021-08-31 NOTE — ASSESSMENT & PLAN NOTE
Patient was initially diagnosed 08/21/2021  Admitted to [place] 8/25 left AMA/26    At that time was not requiring oxygen  Patient returned due to increased shortness of breath and productive cough  Chest x-ray with worsening bilateral pneumonia  Currently is on 2 L of 02  Check troponin, CK, BNP, CRP  Dexamethasone 6 mg IV daily  Remdesivir  Therapeutic anticoagulation  Out of bed, ambulation

## 2021-08-31 NOTE — ASSESSMENT & PLAN NOTE
Continue amlodipine and losartan  Patient is not taking metoprolol tartrate according to previous admission, patient does not know his medications

## 2021-08-31 NOTE — ED ATTENDING ATTESTATION
8/31/2021  Danielle Pérez DO, saw and evaluated the patient  I have discussed the patient with the resident/non-physician practitioner and agree with the resident's/non-physician practitioner's findings, Plan of Care, and MDM as documented in the resident's/non-physician practitioner's note, except where noted  All available labs and Radiology studies were reviewed  I was present for key portions of any procedure(s) performed by the resident/non-physician practitioner and I was immediately available to provide assistance  At this point I agree with the current assessment done in the Emergency Department  I have conducted an independent evaluation of this patient a history and physical is as follows:    Patient returns to the hospital for feeling dehydrated  He was admitted on 21 August with COVID symptoms but left AMA  During that stay, he was not hypoxic and was maintained on room air in the low 90s  Since leaving, he states he has not been eating or drinking much and now feels like he needs IV fluids  He continues to have cough, occasionally productive  He has not noted any subjective or measured fevers  He denies new symptoms  No recent travel or sick contacts  ROS: Denies f/c, HA, CP, SOB, abdominal pain, n/v/d  12 system ROS o/w negative  PE:  Mild distress, appears uncomfortable, alert; PERRL, EOMI; MM mildly dry, no posterior oropharyngeal exudate, edema or erythema; HRR, no murmur, monitor shows sinus rhythm at 85 bpm; lungs and diffuse rales o/w no wheezes, POx 87% on RA (hypoxic) improved to 94% on 3 L NC; abdomen s/nt/nd, nl BS in all 4 quadrant; (-) LE edema or calf TTP, FROM extremities x4; skin p/w/d; CNs GI/NF, oriented  DDx:  Dehydration/hypoxia - persistent/worsening COVID symptoms, superimposed pneumonia, no other clinical evidence of sepsis  A/P: Will check CXR, recheck basic labs, treat symptoms, admit          ED Course         Critical Care Time  CriticalCare Time  Performed by: Luna Choi DO  Authorized by:  Luna Choi DO     Critical care provider statement:     Critical care time (minutes):  30    Critical care time was exclusive of:  Separately billable procedures and treating other patients and teaching time    Critical care was necessary to treat or prevent imminent or life-threatening deterioration of the following conditions:  Dehydration and respiratory failure    Critical care was time spent personally by me on the following activities:  Obtaining history from patient or surrogate, development of treatment plan with patient or surrogate, discussions with consultants, evaluation of patient's response to treatment, examination of patient, ordering and performing treatments and interventions, ordering and review of laboratory studies, ordering and review of radiographic studies, re-evaluation of patient's condition and review of old charts    I assumed direction of critical care for this patient from another provider in my specialty: no

## 2021-08-31 NOTE — H&P
[address] 1965, 61 y o  male MRN: [id number]  Unit/Bed#[facility]ang Lozoya Encounter: [id number]  Primary Care Provider: No primary care provider on file  Date and time admitted to hospital: 8/31/2021 10:58 AM    * Pneumonia due to COVID-19 virus  Assessment & Plan  Patient was initially diagnosed 08/21/2021  Admitted to [facility] 8/25 left AMA/26  At that time was not requiring oxygen  Patient returned due to increased shortness of breath and productive cough  Chest x-ray with worsening bilateral pneumonia  Currently is on 2 L of 02  Check troponin, CK, BNP, CRP  Dexamethasone 6 mg IV daily  Remdesivir  Therapeutic anticoagulation  Out of bed, ambulation      Hyponatremia  Assessment & Plan  Sodium 130, reports poor p o  Intake  Received 1 L of fluid  Repeat labs tomorrow    Type 2 diabetes mellitus ([facility] [address] )  Assessment & Plan  Lab Results   Component Value Date    HGBA1C 7 3 (H) 08/26/2021       No results for input(s): POCGLU in the last 72 hours  Blood Sugar Average: Last 72 hrs:  His last hemoglobin A1c 7 3  Patient is not maintained on any medication  Start sliding scale and Lantus 10 units HS since starting steroids      Hypercholesterolemia  Assessment & Plan  Continue statin    Hypertension  Assessment & Plan  Continue amlodipine and losartan  Patient is not taking metoprolol tartrate according to previous admission, patient does not know his medications      VTE Pharmacologic Prophylaxis:   Moderate Risk (Score 3-4) - Pharmacological DVT Prophylaxis Ordered: enoxaparin (Lovenox)  Code Status: Level 1 - Full Code   Discussion with family: Updated  (wife) via phone  Anticipated Length of Stay: Patient will be admitted on an inpatient basis with an anticipated length of stay of greater than 2 midnights secondary to COVID-19 pneumonia      Total Time for Visit, including Counseling / Coordination of Care: 45 minutes Greater than 50% of this total time spent on direct patient counseling and coordination of care  Chief Complaint:  Shortness of breath and cough    History of Present Illness:  Micheline Chaparro is a 61 y o  male with a PMH of hypertension, hyperlipidemia who presents with shortness of breath and cough  Patient tested positive for COVID 08/21/2021  Admitted to [facility] 08/25/2021 with bilateral pneumonia, that time he did not require oxygen  He left AMA the next day  In the past few days he reports poor p o  Intake, cough and worsening shortness of breath  In the ED found to be hypoxic 87% on room air, placed on 2 L oxygen with good response  Upon my evaluation patient is awake alert and oriented, denies chest pain palpitation nausea vomiting abdominal pain or trouble with urination  Shortness of breath improved since on oxygen, reports poor p o  Intake  Review of Systems:  Review of Systems all reviewed and negative except as above    Past Medical and Surgical History:   Past Medical History:   Diagnosis Date    Chest pain     Diverticulitis     GERD (gastroesophageal reflux disease)     HTN (hypertension)     Pain in left wrist        History reviewed  No pertinent surgical history  Meds/Allergies:  Prior to Admission medications    Medication Sig Start Date End Date Taking?  Authorizing Provider   Ascorbic Acid (vitamin C) 1000 MG tablet Take 1 tablet (1,000 mg total) by mouth 2 (two) times a day 8/23/21 9/22/21 Yes Audrey Avelar PA-C   aspirin (ECOTRIN LOW STRENGTH) 81 mg EC tablet Take 1 tablet (81 mg total) by mouth daily 7/19/21  Yes HEIKE Duran   atorvastatin (LIPITOR) 40 mg tablet Take 1 tablet (40 mg total) by mouth daily with dinner 5/27/21 9/24/21 Yes HEIKE Duran   cholecalciferol (VITAMIN D3) 1,000 units tablet Take 2 tablets (2,000 Units total) by mouth daily 8/23/21 9/22/21 Yes Americo Cecil Gutzweiler, PA-C   amLODIPine (NORVASC) 5 mg tablet Take 1 tablet (5 mg total) by mouth daily 7/19/21   HEIKE Watson   losartan (COZAAR) 50 mg tablet Take 1 tablet (50 mg total) by mouth daily 5/27/21 9/24/21  HEIKE Watson   metoprolol tartrate (LOPRESSOR) 50 mg tablet Take 1 tablet the night before your cardiac CT, take 1 tablet the morning of your cardiac CT, and take the remainder of the tablets with you to the test 7/21/21   Silvia Blount MD   pantoprazole (PROTONIX) 40 mg tablet Take 1 tablet (40 mg total) by mouth daily in the early morning  Patient not taking: Reported on 7/19/2021 4/18/21 5/27/21  Clara Diego MD     I have reviewed home medications with patient personally  Allergies: No Known Allergies    Social History:  Marital Status: /Civil Union   Substance Use History:   Social History     Substance and Sexual Activity   Alcohol Use Yes    Comment: socially     Social History     Tobacco Use   Smoking Status Never Smoker   Smokeless Tobacco Never Used     Social History     Substance and Sexual Activity   Drug Use Never       Family History:  Family History   Problem Relation Age of Onset    No Known Problems Mother     No Known Problems Father     No Known Problems Brother        Physical Exam:     Vitals:   Blood Pressure: 112/67 (08/31/21 1240)  Pulse: 82 (08/31/21 1300)  Temperature: 99 7 °F (37 6 °C) (08/31/21 1054)  Temp Source: Oral (08/31/21 1054)  Respirations: 18 (08/31/21 1054)  SpO2: 97 % (08/31/21 1300)    Physical Exam  Constitutional:       General: He is not in acute distress  HENT:      Head: Atraumatic  Cardiovascular:      Rate and Rhythm: Normal rate and regular rhythm  Heart sounds: No murmur heard  No friction rub  No gallop  Pulmonary:      Effort: Pulmonary effort is normal  No respiratory distress  Breath sounds: Normal breath sounds  No wheezing  Comments: On 2 L oxygen via nasal can  Abdominal:      General: Bowel sounds are normal  There is no distension  Palpations: Abdomen is soft  Tenderness:  There is no abdominal tenderness  Musculoskeletal:         General: No swelling  Cervical back: Neck supple  Skin:     General: Skin is warm and dry  Neurological:      General: No focal deficit present  Mental Status: He is alert  Psychiatric:         Mood and Affect: Mood normal         Additional Data:     Lab Results:  Results from last 7 days   Lab Units 08/31/21  1147 08/25/21  0008   WBC Thousand/uL 6 66 3 10*   HEMOGLOBIN g/dL 12 4 13 5   HEMATOCRIT % 37 3 41 5   PLATELETS Thousands/uL 296 90*   NEUTROS PCT %  --  71   LYMPHS PCT %  --  24   LYMPHO PCT % 11*  --    MONOS PCT %  --  5   MONO PCT % 4  --    EOS PCT % 0 0     Results from last 7 days   Lab Units 08/31/21  1147 08/25/21  0825   SODIUM mmol/L 130* 136   POTASSIUM mmol/L 4 0 4 1   CHLORIDE mmol/L 98* 103   CO2 mmol/L 28 25   BUN mg/dL 15 13   CREATININE mg/dL 0 89 1 03   ANION GAP mmol/L 4 8   CALCIUM mg/dL 8 3 7 7*   ALBUMIN g/dL  --  2 9*   TOTAL BILIRUBIN mg/dL  --  0 26   ALK PHOS U/L  --  59   ALT U/L  --  56   AST U/L  --  50*   GLUCOSE RANDOM mg/dL 207* 146*     Results from last 7 days   Lab Units 08/25/21  0008   INR  0 98         Results from last 7 days   Lab Units 08/26/21  0715   HEMOGLOBIN A1C % 7 3*     Results from last 7 days   Lab Units 08/25/21  0212 08/25/21  0210   LACTIC ACID mmol/L 1 0  --    PROCALCITONIN ng/ml  --  0 06       Imaging: Reviewed radiology reports from this admission including: chest xray  XR chest 1 view portable    (Results Pending)       EKG and Other Studies Reviewed on Admission:   · EKG: No EKG obtained  ** Please Note: This note has been constructed using a voice recognition system   **

## 2021-08-31 NOTE — ASSESSMENT & PLAN NOTE
Lab Results   Component Value Date    HGBA1C 7 3 (H) 08/26/2021       No results for input(s): POCGLU in the last 72 hours      Blood Sugar Average: Last 72 hrs:  His last hemoglobin A1c 7 3  Patient is not maintained on any medication  Start sliding scale and Lantus 10 units HS since starting steroids

## 2021-08-31 NOTE — ED PROVIDER NOTES
History  Chief Complaint   Patient presents with    Dehydration     reports he is here to "take some water in an IV because I am dehydrated from [facility] " COVID + 8/21     56M PMH hypertension, hyperlipidemia presents to the emergency department for dehydration after a recent admission for COVID  He tested positive for COVID on 08/21 and was admitted on 08/25 for persistent fevers and confusion  His confusion resolved and he left AMA on 08/26  He returned to the emergency department today due to feeling dehydrated and feeling "dry mouth and dry chest," he reports poor p o  intake due to low appetite  He reports he is still having shortness of breath but feels is improved from when he left the hospital   Of note he reports he was not on oxygen while admitted and today is the 1st time he is requiring oxygen  He denies previous history of pulmonary disease  He reports he is continuing to cough clear/white mucus and is having difficulty sleeping from coughing, but feels that the cough has been improving  He denies chest pain other than when he is coughing  He denies congestion, rhinorrhea, sore throat  He reports he has had intermittent fever and chills but has not felt them today  He denies headache, lightheadedness, confusion  Denies abdominal pain, nausea, vomiting, or diarrhea  Prior to Admission Medications   Prescriptions Last Dose Informant Patient Reported? Taking?    Ascorbic Acid (vitamin C) 1000 MG tablet 8/30/2021 at Unknown time  No Yes   Sig: Take 1 tablet (1,000 mg total) by mouth 2 (two) times a day   amLODIPine (NORVASC) 5 mg tablet Unknown at Unknown time  No No   Sig: Take 1 tablet (5 mg total) by mouth daily   aspirin (ECOTRIN LOW STRENGTH) 81 mg EC tablet Past Month at Unknown time  No Yes   Sig: Take 1 tablet (81 mg total) by mouth daily   atorvastatin (LIPITOR) 40 mg tablet 8/30/2021 at Unknown time Self No Yes   Sig: Take 1 tablet (40 mg total) by mouth daily with dinner cholecalciferol (VITAMIN D3) 1,000 units tablet 8/30/2021 at Unknown time  No Yes   Sig: Take 2 tablets (2,000 Units total) by mouth daily   losartan (COZAAR) 50 mg tablet Unknown at Unknown time Self No No   Sig: Take 1 tablet (50 mg total) by mouth daily   metoprolol tartrate (LOPRESSOR) 50 mg tablet Unknown at Unknown time  No No   Sig: Take 1 tablet the night before your cardiac CT, take 1 tablet the morning of your cardiac CT, and take the remainder of the tablets with you to the test   pantoprazole (PROTONIX) 40 mg tablet  Self No No   Sig: Take 1 tablet (40 mg total) by mouth daily in the early morning   Patient not taking: Reported on 7/19/2021      Facility-Administered Medications: None       Past Medical History:   Diagnosis Date    Chest pain     Diverticulitis     GERD (gastroesophageal reflux disease)     HTN (hypertension)     Pain in left wrist        History reviewed  No pertinent surgical history  Family History   Problem Relation Age of Onset    No Known Problems Mother     No Known Problems Father     No Known Problems Brother      I have reviewed and agree with the history as documented  E-Cigarette/Vaping    E-Cigarette Use Never User      E-Cigarette/Vaping Substances    Nicotine No     THC No     CBD No     Flavoring No     Other No     Unknown No      Social History     Tobacco Use    Smoking status: Never Smoker    Smokeless tobacco: Never Used   Vaping Use    Vaping Use: Never used   Substance Use Topics    Alcohol use: Yes     Comment: socially    Drug use: Never        Review of Systems   Constitutional: Positive for appetite change, chills, fatigue and fever  HENT: Negative for congestion, rhinorrhea and sore throat  Respiratory: Positive for cough and shortness of breath  Negative for wheezing  Cardiovascular: Negative for chest pain and palpitations  Gastrointestinal: Negative for abdominal pain, diarrhea, nausea and vomiting     Neurological: Negative for light-headedness and headaches  Psychiatric/Behavioral: Negative for confusion  All other systems reviewed and are negative  Physical Exam  ED Triage Vitals   Temperature Pulse Respirations Blood Pressure SpO2   08/31/21 1054 08/31/21 1054 08/31/21 1054 08/31/21 1057 08/31/21 1057   99 7 °F (37 6 °C) 85 18 121/82 (!) 87 %      Temp Source Heart Rate Source Patient Position - Orthostatic VS BP Location FiO2 (%)   08/31/21 1054 08/31/21 1240 08/31/21 1240 08/31/21 1240 --   Oral Monitor Lying Right arm       Pain Score       08/31/21 1600       No Pain             Orthostatic Vital Signs  Vitals:    09/03/21 0809 09/03/21 1638 09/03/21 2309 09/04/21 1948   BP: 154/98 151/98 149/90 135/69   Pulse: 103  64    Patient Position - Orthostatic VS: Lying  Lying        Physical Exam  Vitals and nursing note reviewed  Constitutional:       General: He is not in acute distress  Appearance: He is normal weight  HENT:      Head: Normocephalic  Mouth/Throat:      Mouth: Mucous membranes are dry  Eyes:      Pupils: Pupils are equal, round, and reactive to light  Cardiovascular:      Rate and Rhythm: Normal rate and regular rhythm  Heart sounds: Normal heart sounds  No murmur heard  Pulmonary:      Effort: Pulmonary effort is normal       Breath sounds: Rales (Diffuse crackles throughout lung fields) present  Wheezes: Occasional wheeze  Abdominal:      General: Abdomen is flat  There is no distension  Palpations: Abdomen is soft  Tenderness: There is no abdominal tenderness  Skin:     General: Skin is warm and dry  Neurological:      General: No focal deficit present  Mental Status: He is alert           ED Medications  Medications   sodium chloride 0 9 % bolus 1,000 mL (0 mL Intravenous Stopped 8/31/21 1410)   remdesivir (Veklury) 200 mg in sodium chloride 0 9 % 250 mL IVPB (200 mg Intravenous New Bag 8/31/21 1746)       Diagnostic Studies  Results Reviewed Procedure Component Value Units Date/Time    CBC and differential [[id number]]  (Abnormal) Collected: 09/01/21 0508    Lab Status: Final result Specimen: Blood from Arm, Right Updated: 09/01/21 0808     WBC 3 89 Thousand/uL      RBC 4 07 Million/uL      Hemoglobin 11 9 g/dL      Hematocrit 35 6 %      MCV 88 fL      MCH 29 2 pg      MCHC 33 4 g/dL      RDW 13 0 %      MPV 9 8 fL      Platelets 225 Thousands/uL     Narrative: This is an appended report  These results have been appended to a previously verified report      Comprehensive metabolic panel [[id number]]  (Abnormal) Collected: 09/01/21 0508    Lab Status: Final result Specimen: Blood from Arm, Right Updated: 09/01/21 0624     Sodium 135 mmol/L      Potassium 4 5 mmol/L      Chloride 104 mmol/L      CO2 27 mmol/L      ANION GAP 4 mmol/L      BUN 18 mg/dL      Creatinine 0 74 mg/dL      Glucose 204 mg/dL      Calcium 8 1 mg/dL      Corrected Calcium 9 6 mg/dL       U/L       U/L      Alkaline Phosphatase 138 U/L      Total Protein 6 8 g/dL      Albumin 2 1 g/dL      Total Bilirubin 0 80 mg/dL      eGFR 118 ml/min/1 73sq m     Narrative:      [place] guidelines for Chronic Kidney Disease (CKD):     Stage 1 with normal or high GFR (GFR > 90 mL/min/1 73 square meters)    Stage 2 Mild CKD (GFR = 60-89 mL/min/1 73 square meters)    Stage 3A Moderate CKD (GFR = 45-59 mL/min/1 73 square meters)    Stage 3B Moderate CKD (GFR = 30-44 mL/min/1 73 square meters)    Stage 4 Severe CKD (GFR = 15-29 mL/min/1 73 square meters)    Stage 5 End Stage CKD (GFR <15 mL/min/1 73 square meters)  Note: GFR calculation is accurate only with a steady state creatinine    Troponin I [[id number]]  (Normal) Collected: 08/31/21 1406    Lab Status: Final result Specimen: Blood from Arm, Left Updated: 08/31/21 1447     Troponin I <0 02 ng/mL     CK (with reflex to MB) [[id number]]  (Normal) Collected: 08/31/21 1147    Lab Status: Final result Specimen: Blood from Arm, Left Updated: 08/31/21 1431     Total  U/L     NT-BNP PRO [[id number]]  (Normal) Collected: 08/31/21 1147    Lab Status: Final result Specimen: Blood from Arm, Left Updated: 08/31/21 1431     NT-proBNP 61 pg/mL     C-reactive protein [[id number]]  (Abnormal) Collected: 08/31/21 1147    Lab Status: Final result Specimen: Blood from Arm, Left Updated: 08/31/21 1431      0 mg/L     CBC and differential [[id number]]  (Normal) Collected: 08/31/21 1147    Lab Status: Final result Specimen: Blood from Arm, Left Updated: 08/31/21 1309     WBC 6 66 Thousand/uL      RBC 4 33 Million/uL      Hemoglobin 12 4 g/dL      Hematocrit 37 3 %      MCV 86 fL      MCH 28 6 pg      MCHC 33 2 g/dL      RDW 12 8 %      MPV 9 8 fL      Platelets 241 Thousands/uL     Narrative: This is an appended report  These results have been appended to a previously verified report      Manual Differential(PHLEBS Do Not Order) [[id number]]  (Abnormal) Collected: 08/31/21 1147    Lab Status: Final result Specimen: Blood from Arm, Left Updated: 08/31/21 1309     Segmented % 85 %      Lymphocytes % 11 %      Monocytes % 4 %      Eosinophils, % 0 %      Basophils % 0 %      Absolute Neutrophils 5 66 Thousand/uL      Lymphocytes Absolute 0 73 Thousand/uL      Monocytes Absolute 0 27 Thousand/uL      Eosinophils Absolute 0 00 Thousand/uL      Basophils Absolute 0 00 Thousand/uL      Total Counted --     Platelet Estimate Adequate     Artifact Present    Basic metabolic panel [[id number]]  (Abnormal) Collected: 08/31/21 1147    Lab Status: Final result Specimen: Blood from Arm, Left Updated: 08/31/21 1226     Sodium 130 mmol/L      Potassium 4 0 mmol/L      Chloride 98 mmol/L      CO2 28 mmol/L      ANION GAP 4 mmol/L      BUN 15 mg/dL      Creatinine 0 89 mg/dL      Glucose 207 mg/dL      Calcium 8 3 mg/dL      eGFR 110 ml/min/1 73sq m     Narrative:      [place] guidelines for Chronic Kidney Disease (CKD):     Stage 1 with normal or high GFR (GFR > 90 mL/min/1 73 square meters)    Stage 2 Mild CKD (GFR = 60-89 mL/min/1 73 square meters)    Stage 3A Moderate CKD (GFR = 45-59 mL/min/1 73 square meters)    Stage 3B Moderate CKD (GFR = 30-44 mL/min/1 73 square meters)    Stage 4 Severe CKD (GFR = 15-29 mL/min/1 73 square meters)    Stage 5 End Stage CKD (GFR <15 mL/min/1 73 square meters)  Note: GFR calculation is accurate only with a steady state creatinine                 XR chest 1 view portable   Final Result by Se Brennan MD (08/31 1645)      Multifocal patchy airspace disease bilaterally worsened since the prior examination and consistent with the clinical history  Workstation performed: [id number]               Procedures  Procedures      ED Course                             SBIRT 22yo+      Most Recent Value   SBIRT (23 yo +)   In order to provide better care to our patients, we are screening all of our patients for alcohol and drug use  Would it be okay to ask you these screening questions? No Filed at: 08/31/2021 1121                [facility]  Number of Diagnoses or Management Options  Acute respiratory failure due to COVID-19 ([facility] [address] )  Hypoxia  Pneumonia due to COVID-19 virus  Diagnosis management comments: 62M PMH hypertension, hyperlipidemia presents to the emergency department with new oxygen requirement after recently leaving AMA for COVID pneumonia  Workup including vital signs, physical exam, chest x-ray, CBC, BMP  Treatment including IV fluids  Saturation at 87 percent on room air, new requirement of 2 liters oxygen nasal cannula  Chest x-ray with increased diffuse infiltrates compared to x-ray during admission  Plan to admit to Medicine for COVID pneumonia with new oxygen requirement         Amount and/or Complexity of Data Reviewed  Clinical lab tests: reviewed  Decide to obtain previous medical records or to obtain history from someone other than the patient: yes        Disposition  Final diagnoses:   Pneumonia due to COVID-19 virus   Hypoxia   [facility]     Time reflects when diagnosis was documented in both MDM as applicable and the Disposition within this note     Time User Action Codes Description Comment    8/31/2021 12:49 PM Paramjit Simmering Add [U07 1,  J12 82] Pneumonia due to COVID-19 virus     9/4/2021  1:22 PM Osmel Arvizu Add [R74 01] Transaminitis     9/6/2021  4:49 PM Paramjit Simmering Add [R09 02] Hypoxia     9/6/2021  4:52 PM Joannaелена Danitza Goehner Add [U07 1,  J96 00] Acute respiratory failure [facility]       ED Disposition     ED Disposition Condition Date/Time Comment    Admit Stable Tue Aug 31, 2021 12:49 PM Case was discussed with Dr Alayna Cannon and the patient's admission status was agreed to be Admission Status: inpatient status to the service of Dr Alayna Cannon   Follow-up Information    None         Discharge Medication List as of 9/4/2021  3:46 PM      START taking these medications    Details   albuterol (Proventil HFA) 90 mcg/act inhaler Inhale 2 puffs every 6 (six) hours as needed for wheezing or shortness of breath for up to 10 days, Starting Sat 9/4/2021, Until Tue 9/14/2021 at 2359, Normal      dextromethorphan-guaiFENesin (ROBITUSSIN DM)  mg/5 mL syrup Take 10 mL by mouth every 8 (eight) hours for 7 days, Starting Sat 9/4/2021, Until Sat 9/11/2021, Normal      predniSONE 10 mg tablet Multiple Dosages:Starting Sat 9/4/2021, Until Mon 9/6/2021 at 2359, THEN Starting Tue 9/7/2021, Until Thu 9/9/2021 at 2359, THEN Starting Fri 9/10/2021, Until Sun 9/12/2021 at 2359Take 3 tablets (30 mg total) by mouth daily for 3 days, THEN 2 tablets  (20 mg total) daily for 3 days, THEN 1 tablet (10 mg total) daily for 3 days  , Normal         CONTINUE these medications which have NOT CHANGED    Details   Ascorbic Acid (vitamin C) 1000 MG tablet Take 1 tablet (1,000 mg total) by mouth 2 (two) times a day, Starting Mon 8/23/2021, Until Wed 9/22/2021, Normal      aspirin (ECOTRIN LOW STRENGTH) 81 mg EC tablet Take 1 tablet (81 mg total) by mouth daily, Starting Mon 7/19/2021, No Print      atorvastatin (LIPITOR) 40 mg tablet Take 1 tablet (40 mg total) by mouth daily with dinner, Starting Thu 5/27/2021, Until Fri 9/24/2021, Normal      cholecalciferol (VITAMIN D3) 1,000 units tablet Take 2 tablets (2,000 Units total) by mouth daily, Starting Mon 8/23/2021, Until Wed 9/22/2021, Normal      amLODIPine (NORVASC) 5 mg tablet Take 1 tablet (5 mg total) by mouth daily, Starting Mon 7/19/2021, Normal      losartan (COZAAR) 50 mg tablet Take 1 tablet (50 mg total) by mouth daily, Starting Thu 5/27/2021, Until Fri 9/24/2021, Normal      metoprolol tartrate (LOPRESSOR) 50 mg tablet Take 1 tablet the night before your cardiac CT, take 1 tablet the morning of your cardiac CT, and take the remainder of the tablets with you to the test, Normal      pantoprazole (PROTONIX) 40 mg tablet Take 1 tablet (40 mg total) by mouth daily in the early morning, Starting Sun 4/18/2021, Until Thu 5/27/2021, Normal               PDMP Review       Value Time User    PDMP Reviewed  Yes 8/25/2021  1:44 AM Ramiro Butler MD           ED Provider  Attending physically available and evaluated Zackary Meza  I managed the patient along with the ED Attending      Electronically Signed by         Jaimie Moss MD  08/31/21 5774       Jaimie Moss MD  09/06/21 840 Donato Baker MD  09/15/21 1224

## 2021-08-31 NOTE — LETTER
[address] [facility] [address]  [address]  [place] [address]  Dept: [phone]    September 4, 2021     Patient: Camelia Camacho   YOB: 1965   Date of Visit: 8/31/2021       To Whom it May Concern:    Camelia Camacho is under my professional care  He was seen in the hospital from 8/31/2021   to 09/04/21  Patient may go back to work 09/20/2021   If you have any questions or concerns, please don't hesitate to call           Sincerely,          Marisol Snyder MD

## 2021-08-31 NOTE — UTILIZATION REVIEW
Notification of Discharge   This is a Notification of Discharge from our facility [address]  Please be advised that this patient has been discharge from our facility  Below you will find the admission and discharge date and time including the patients disposition  UTILIZATION REVIEW CONTACT:  Sridhar Fortune  Utilization   Network Utilization Review Department  Phone: [phone] x carefully listen to the prompts  All voicemails are confidential   Email: [email or web address]     PHYSICIAN ADVISORY SERVICES:  FOR [id number] REVIEW - MEDICAL NECESSITY DENIAL  Phone: [phone]  Fax: [phone]  Email: [email or web address]     PRESENTATION DATE: 8/25/2021 12:42 AM  OBERVATION ADMISSION DATE:   INPATIENT ADMISSION DATE: 8/25/21  5:42 AM   DISCHARGE DATE: 8/26/2021 12:22 PM  DISPOSITION: Left against medical advice or discontinued care Left against medical advice or discontinued care      IMPORTANT INFORMATION:  Send all requests for admission clinical reviews, approved or denied determinations and any other requests to dedicated fax number below belonging to the campus where the patient is receiving treatment   List of dedicated fax numbers:  [address] DENIALS (Administrative/Medical Necessity) [phone]   [address] (Maternity/NICU/Pediatrics) [phone]   [place] Notice [phone]   [address] [facility] [address] [phone]   [address] [place] [address] [phone]   [facility] [address] [facility] [phone]   [facility]  [phone]   [address] [facility]  [address] [facility] [address] [facility] [phone]

## 2021-08-31 NOTE — ED NOTES
[facility] [address] [place] [address]  08/31/21 1481

## 2021-08-31 NOTE — LETTER
[address] [facility] [address]  [address]  [facility] [address]  Dept: [phone]    September 4, 2021     Patient: Christine Whitfield   YOB: 1965   Date of Visit: 8/31/2021       To Whom it May Concern:    Christine Whitfield is under my professional care  He was admitted to the hospital the  Between 08/31/2021 to 09/04/2021  Patient may go back to work 09/20/2021   If you have any questions or concerns, please don't hesitate to call           Sincerely,          Loren Bowman MD

## 2021-09-01 PROBLEM — R74.01 TRANSAMINITIS: Status: ACTIVE | Noted: 2021-09-01

## 2021-09-01 LAB
ALBUMIN SERPL BCP-MCNC: 2.1 G/DL (ref 3.5–5)
ALP SERPL-CCNC: 138 U/L (ref 46–116)
ALT SERPL W P-5'-P-CCNC: 344 U/L (ref 12–78)
ANION GAP SERPL CALCULATED.3IONS-SCNC: 4 MMOL/L (ref 4–13)
ARTIFACT: PRESENT
AST SERPL W P-5'-P-CCNC: 219 U/L (ref 5–45)
BASOPHILS # BLD MANUAL: 0 THOUSAND/UL (ref 0–0.1)
BASOPHILS NFR MAR MANUAL: 0 % (ref 0–1)
BILIRUB SERPL-MCNC: 0.8 MG/DL (ref 0.2–1)
BUN SERPL-MCNC: 18 MG/DL (ref 5–25)
BURR CELLS BLD QL SMEAR: PRESENT
CALCIUM ALBUM COR SERPL-MCNC: 9.6 MG/DL (ref 8.3–10.1)
CALCIUM SERPL-MCNC: 8.1 MG/DL (ref 8.3–10.1)
CHLORIDE SERPL-SCNC: 104 MMOL/L (ref 100–108)
CO2 SERPL-SCNC: 27 MMOL/L (ref 21–32)
CREAT SERPL-MCNC: 0.74 MG/DL (ref 0.6–1.3)
EOSINOPHIL # BLD MANUAL: 0 THOUSAND/UL (ref 0–0.4)
EOSINOPHIL NFR BLD MANUAL: 0 % (ref 0–6)
ERYTHROCYTE [DISTWIDTH] IN BLOOD BY AUTOMATED COUNT: 13 % (ref 11.6–15.1)
GFR SERPL CREATININE-BSD FRML MDRD: 118 ML/MIN/1.73SQ M
GLUCOSE SERPL-MCNC: 197 MG/DL (ref 65–140)
GLUCOSE SERPL-MCNC: 204 MG/DL (ref 65–140)
GLUCOSE SERPL-MCNC: 306 MG/DL (ref 65–140)
GLUCOSE SERPL-MCNC: 308 MG/DL (ref 65–140)
GLUCOSE SERPL-MCNC: 345 MG/DL (ref 65–140)
HBV CORE AB SER QL: NORMAL
HBV CORE IGM SER QL: NORMAL
HBV SURFACE AG SER QL: NORMAL
HCT VFR BLD AUTO: 35.6 % (ref 36.5–49.3)
HCV AB SER QL: NORMAL
HGB BLD-MCNC: 11.9 G/DL (ref 12–17)
LYMPHOCYTES # BLD AUTO: 0.47 THOUSAND/UL (ref 0.6–4.47)
LYMPHOCYTES # BLD AUTO: 12 % (ref 14–44)
MCH RBC QN AUTO: 29.2 PG (ref 26.8–34.3)
MCHC RBC AUTO-ENTMCNC: 33.4 G/DL (ref 31.4–37.4)
MCV RBC AUTO: 88 FL (ref 82–98)
MONOCYTES # BLD AUTO: 0.16 THOUSAND/UL (ref 0–1.22)
MONOCYTES NFR BLD: 4 % (ref 4–12)
NEUTROPHILS # BLD MANUAL: 3.27 THOUSAND/UL (ref 1.85–7.62)
NEUTS BAND NFR BLD MANUAL: 4 % (ref 0–8)
NEUTS SEG NFR BLD AUTO: 80 % (ref 43–75)
PLATELET # BLD AUTO: 289 THOUSANDS/UL (ref 149–390)
PLATELET BLD QL SMEAR: ADEQUATE
PMV BLD AUTO: 9.8 FL (ref 8.9–12.7)
POIKILOCYTOSIS BLD QL SMEAR: PRESENT
POLYCHROMASIA BLD QL SMEAR: PRESENT
POTASSIUM SERPL-SCNC: 4.5 MMOL/L (ref 3.5–5.3)
PROT SERPL-MCNC: 6.8 G/DL (ref 6.4–8.2)
RBC # BLD AUTO: 4.07 MILLION/UL (ref 3.88–5.62)
RBC MORPH BLD: PRESENT
SODIUM SERPL-SCNC: 135 MMOL/L (ref 136–145)
WBC # BLD AUTO: 3.89 THOUSAND/UL (ref 4.31–10.16)

## 2021-09-01 PROCEDURE — 86705 HEP B CORE ANTIBODY IGM: CPT | Performed by: PHYSICIAN ASSISTANT

## 2021-09-01 PROCEDURE — 85007 BL SMEAR W/DIFF WBC COUNT: CPT | Performed by: INTERNAL MEDICINE

## 2021-09-01 PROCEDURE — 80053 COMPREHEN METABOLIC PANEL: CPT | Performed by: INTERNAL MEDICINE

## 2021-09-01 PROCEDURE — 86704 HEP B CORE ANTIBODY TOTAL: CPT | Performed by: PHYSICIAN ASSISTANT

## 2021-09-01 PROCEDURE — 82948 REAGENT STRIP/BLOOD GLUCOSE: CPT

## 2021-09-01 PROCEDURE — 87340 HEPATITIS B SURFACE AG IA: CPT | Performed by: PHYSICIAN ASSISTANT

## 2021-09-01 PROCEDURE — 99232 SBSQ HOSP IP/OBS MODERATE 35: CPT | Performed by: PHYSICIAN ASSISTANT

## 2021-09-01 PROCEDURE — 85027 COMPLETE CBC AUTOMATED: CPT | Performed by: INTERNAL MEDICINE

## 2021-09-01 PROCEDURE — 86803 HEPATITIS C AB TEST: CPT | Performed by: PHYSICIAN ASSISTANT

## 2021-09-01 RX ORDER — IBUPROFEN 400 MG/1
400 TABLET ORAL EVERY 6 HOURS PRN
Status: DISCONTINUED | OUTPATIENT
Start: 2021-09-01 | End: 2021-09-04 | Stop reason: HOSPADM

## 2021-09-01 RX ORDER — INSULIN GLARGINE 100 [IU]/ML
15 INJECTION, SOLUTION SUBCUTANEOUS
Status: DISCONTINUED | OUTPATIENT
Start: 2021-09-01 | End: 2021-09-02

## 2021-09-01 RX ORDER — SODIUM CHLORIDE, SODIUM GLUCONATE, SODIUM ACETATE, POTASSIUM CHLORIDE, MAGNESIUM CHLORIDE, SODIUM PHOSPHATE, DIBASIC, AND POTASSIUM PHOSPHATE .53; .5; .37; .037; .03; .012; .00082 G/100ML; G/100ML; G/100ML; G/100ML; G/100ML; G/100ML; G/100ML
100 INJECTION, SOLUTION INTRAVENOUS CONTINUOUS
Status: DISCONTINUED | OUTPATIENT
Start: 2021-09-01 | End: 2021-09-03

## 2021-09-01 RX ORDER — PANTOPRAZOLE SODIUM 40 MG/1
40 TABLET, DELAYED RELEASE ORAL
Status: DISCONTINUED | OUTPATIENT
Start: 2021-09-01 | End: 2021-09-04 | Stop reason: HOSPADM

## 2021-09-01 RX ADMIN — SODIUM CHLORIDE, SODIUM GLUCONATE, SODIUM ACETATE, POTASSIUM CHLORIDE, MAGNESIUM CHLORIDE, SODIUM PHOSPHATE, DIBASIC, AND POTASSIUM PHOSPHATE 100 ML/HR: .53; .5; .37; .037; .03; .012; .00082 INJECTION, SOLUTION INTRAVENOUS at 12:30

## 2021-09-01 RX ADMIN — AMLODIPINE BESYLATE 5 MG: 5 TABLET ORAL at 09:28

## 2021-09-01 RX ADMIN — INSULIN GLARGINE 15 UNITS: 100 INJECTION, SOLUTION SUBCUTANEOUS at 21:29

## 2021-09-01 RX ADMIN — ATORVASTATIN CALCIUM 40 MG: 40 TABLET, FILM COATED ORAL at 17:07

## 2021-09-01 RX ADMIN — GUAIFENESIN AND DEXTROMETHORPHAN 10 ML: 100; 10 SYRUP ORAL at 21:45

## 2021-09-01 RX ADMIN — INSULIN LISPRO 5 UNITS: 100 INJECTION, SOLUTION INTRAVENOUS; SUBCUTANEOUS at 12:29

## 2021-09-01 RX ADMIN — LOSARTAN POTASSIUM 50 MG: 50 TABLET, FILM COATED ORAL at 09:28

## 2021-09-01 RX ADMIN — ASPIRIN 81 MG: 81 TABLET, COATED ORAL at 09:28

## 2021-09-01 RX ADMIN — REMDESIVIR 100 MG: 100 INJECTION, POWDER, LYOPHILIZED, FOR SOLUTION INTRAVENOUS at 17:07

## 2021-09-01 RX ADMIN — GUAIFENESIN AND DEXTROMETHORPHAN 10 ML: 100; 10 SYRUP ORAL at 09:28

## 2021-09-01 RX ADMIN — INSULIN LISPRO 2 UNITS: 100 INJECTION, SOLUTION INTRAVENOUS; SUBCUTANEOUS at 09:26

## 2021-09-01 RX ADMIN — INSULIN LISPRO 4 UNITS: 100 INJECTION, SOLUTION INTRAVENOUS; SUBCUTANEOUS at 17:07

## 2021-09-01 RX ADMIN — ENOXAPARIN SODIUM 80 MG: 80 INJECTION SUBCUTANEOUS at 05:08

## 2021-09-01 RX ADMIN — PANTOPRAZOLE SODIUM 40 MG: 40 TABLET, DELAYED RELEASE ORAL at 12:29

## 2021-09-01 RX ADMIN — Medication 2000 UNITS: at 09:28

## 2021-09-01 RX ADMIN — ENOXAPARIN SODIUM 80 MG: 80 INJECTION SUBCUTANEOUS at 17:07

## 2021-09-01 RX ADMIN — DEXAMETHASONE SODIUM PHOSPHATE 6 MG: 4 INJECTION INTRA-ARTICULAR; INTRALESIONAL; INTRAMUSCULAR; INTRAVENOUS; SOFT TISSUE at 17:07

## 2021-09-01 NOTE — UTILIZATION REVIEW
Initial Clinical Review    Admission: Date/Time/Statement:   Admission Orders (From admission, onward)     Ordered        08/31/21 1250  Inpatient Admission  Once                   Orders Placed This Encounter   Procedures    Inpatient Admission     Standing Status:   Standing     Number of Occurrences:   1     Order Specific Question:   Level of Care     Answer:   Med Surg [16]     Order Specific Question:   Estimated length of stay     Answer:   More than 2 Midnights     Order Specific Question:   Certification     Answer:   I certify that inpatient services are medically necessary for this patient for a duration of greater than two midnights  See H&P and MD Progress Notes for additional information about the patient's course of treatment  ED Arrival Information     Expected Arrival Acuity    - 8/31/2021 10:32 Emergent         Means of arrival Escorted by Service Admission type    [facility]         Arrival complaint    dehydrated        Chief Complaint   Patient presents with    Dehydration     reports he is here to "take some water in an IV because I am dehydrated from [place] " [place] + 8/21     Initial Presentation:   Mr García Catherine is a 62 yo male who presents to the ED from home with c/o cough and SOB  Was + for Covid on 8/21, admitted to [facility] on 8/25 with bilat pneumonia, signed out AMA on 8/26  He continues to have poor oral intake, cough and worsening SOB  PMH: HTN, HLD, NIDDM  In the ED, O2 sat was 87% on RA, placed on 2L NC oxygen with good response  Imaging shows worsening bilat pneumonia  He is admitted to INPATIENT status with Pneumonia d/t Covid 19 - oxygen, trend inflammatory markers, IV steroids, IV Rmedesivir, anticoagulation, ambulate  Hyponatremia - 130 - IV fluids  NIIDM - SSI cover  HTN - does not take his meds, continue Amlodipine, Losartan  Hypercholesterolemia - statin        Date: 9/1   Day 2:       ED Triage Vitals   Temperature Pulse Respirations Blood Pressure SpO2   08/31/21 1054 08/31/21 1054 08/31/21 1054 08/31/21 1057 08/31/21 1057   99 7 °F (37 6 °C) 85 18 121/82 (!) 87 %      Temp Source Heart Rate Source Patient Position - Orthostatic VS BP Location FiO2 (%)   08/31/21 1054 08/31/21 1240 08/31/21 1240 08/31/21 1240 --   Oral Monitor Lying Right arm       Pain Score       08/31/21 1600       No Pain          Wt Readings from Last 1 Encounters:   08/31/21 83 2 kg (183 lb 6 8 oz)     Additional Vital Signs:   09/01/21 0800  --  --  --  --  --  --  32  3 L/min  --  --   09/01/21 07:38:54  --  81  16  149/83  105  90 %  --  --  --  --   08/31/21 23:02:02  98 9 °F (37 2 °C)  79  20  124/79  94  92 %  --  --  --  --   08/31/21 23:01:43  98 9 °F (37 2 °C)  --  --  124/79  94  --  --  --  --  --   08/31/21 1955  --  --  --  --  --  92 %  28  2 L/min  Nasal cannula  --   08/31/21 1600  --  --  --  --  --  --  28  2 L/min  Nasal cannula  --   08/31/21 15:35:35  98 6 °F (37 °C)  87  --  --  --  86 %Abnormal   --  --  --  --   08/31/21 15:14:11  --  --  18  121/78  92  --  --  --  --  --   08/31/21 1408  --  85  18  111/57  --  95 %  28  2 L/min  Nasal cannula  --   08/31/21 1300  --  82  --  --  --  97 %  --  --  --  --   08/31/21 1240  --  86  --  112/67  --  100 %  28  2 L/min  Nasal cannula  Lying     Pertinent Labs/Diagnostic Test Results:     8/31 CXR - Multifocal patchy airspace disease bilaterally worsened since the prior examination and consistent with the clinical history        Results from last 7 days   Lab Units 09/01/21  0508 08/31/21  1147 08/26/21  0714   WBC Thousand/uL 3 89* 6 66 2 89*   HEMOGLOBIN g/dL 11 9* 12 4 14 0   HEMATOCRIT % 35 6* 37 3 42 3   PLATELETS Thousands/uL 289 296 102*   BANDS PCT % 4  --   --          Results from last 7 days   Lab Units 09/01/21  0508 08/31/21  1147 08/26/21  0715   SODIUM mmol/L 135* 130* 138   POTASSIUM mmol/L 4 5 4 0 3 8   CHLORIDE mmol/L 104 98* 103   CO2 mmol/L 27 28 28   ANION GAP mmol/L 4 4 7   BUN mg/dL 18 15 13   CREATININE mg/dL 0 74 0 89 0 89   EGFR ml/min/1 73sq m 118 110 110   CALCIUM mg/dL 8 1* 8 3 8 0*     Results from last 7 days   Lab Units 09/01/21  0508   AST U/L 219*   ALT U/L 344*   ALK PHOS U/L 138*   TOTAL PROTEIN g/dL 6 8   ALBUMIN g/dL 2 1*   TOTAL BILIRUBIN mg/dL 0 80     Results from last 7 days   Lab Units 09/01/21  1116 09/01/21  0734 08/31/21  2124 08/31/21  1617   POC GLUCOSE mg/dl 345* 197* 211* 214*     Results from last 7 days   Lab Units 09/01/21  0508 08/31/21  1147 08/26/21  0715   GLUCOSE RANDOM mg/dL 204* 207* 145*         Results from last 7 days   Lab Units 08/26/21  0715   HEMOGLOBIN A1C % 7 3*   EAG mg/dl 163     Results from last 7 days   Lab Units 08/31/21  1147   CK TOTAL U/L 142     Results from last 7 days   Lab Units 08/31/21  1406 08/25/21  1559   TROPONIN I ng/mL <0 02 <0 02     Results from last 7 days   Lab Units 08/31/21  1147   NT-PRO BNP pg/mL 61                 Results from last 7 days   Lab Units 08/31/21  1147   CRP mg/L 258 0*     ED Treatment:   Medication Administration from 08/31/2021 1032 to 08/31/2021 1505       Date/Time Order Dose Route Action     08/31/2021 1142 sodium chloride 0 9 % bolus 1,000 mL 1,000 mL Intravenous New Bag        Past Medical History:   Diagnosis Date    Chest pain     Diverticulitis     GERD (gastroesophageal reflux disease)     HTN (hypertension)     Pain in left wrist      Present on Admission:   Pneumonia due to COVID-19 virus   Hypercholesterolemia   Hypertension    Admitting Diagnosis: Dehydration [E86 0]  Pneumonia due to COVID-19 virus [U07 1, J12 82]     Age/Sex: 61 y o  male     Admission Orders:    Scheduled Medications:  amLODIPine, 5 mg, Oral, Daily  aspirin, 81 mg, Oral, Daily  atorvastatin, 40 mg, Oral, After Dinner  cholecalciferol, 2,000 Units, Oral, Daily  dexamethasone, 6 mg, Intravenous, Q24H  enoxaparin, 1 mg/kg, Subcutaneous, Q12H [address]  insulin glargine, 15 Units, Subcutaneous, HS  insulin lispro, 1-6 Units, Subcutaneous, TID AC  losartan, 50 mg, Oral, Daily  pantoprazole, 40 mg, Oral, Early Morning  remdesivir, 100 mg, Intravenous, Q24H      Continuous IV Infusions:  multi-electrolyte, 100 mL/hr, Intravenous, Continuous      PRN Meds:  acetaminophen, 650 mg, Oral, Q6H PRN  al mag oxide-diphenhydramine-lidocaine viscous, 10 mL, Swish & Swallow, Q6H PRN  dextromethorphan-guaiFENesin, 10 mL, Oral, Q4H PRN - x 1 8/31, 9/1    Isolation   SCDs  POC GLUCOSE AC/HS WITH SSI COVERAGE   Activity as trae   HOURLY INCENTIVE SPIROMETRY  ADA diet     Network Utilization Review Department  ATTENTION: Please call with any questions or concerns to [phone] and carefully listen to the prompts so that you are directed to the right person  All voicemails are confidential   Genesis Ritchie all requests for admission clinical reviews, approved or denied determinations and any other requests to dedicated fax number below belonging to the campus where the patient is receiving treatment   List of dedicated fax numbers for the Facilities:  [address] DENIALS (Administrative/Medical Necessity) [phone]   [address] (Maternity/NICU/Pediatrics) [phone]   [address] Gayathri Cifuentes [id number] [place] [address] Lukasz Georges 1481 [address] [facility] [facility] [phone]

## 2021-09-01 NOTE — ASSESSMENT & PLAN NOTE
Lab Results   Component Value Date    HGBA1C 7 3 (H) 08/26/2021       Recent Labs     08/31/21  1617 08/31/21  2124 09/01/21  0734 09/01/21  1116   POCGLU 214* 211* 197* 345*       Blood Sugar Average: Last 72 hrs:  (P) 241  75His last hemoglobin A1c 7 3  Patient is not maintained on any medication  Start sliding scale and increase Lantus from 10 to 15 units HS since starting steroids

## 2021-09-01 NOTE — ASSESSMENT & PLAN NOTE
· Continue amlodipine and losartan  · Patient is not taking metoprolol tartrate according to previous admission, patient does not know his medications --> he was only on metoprolol for cardiac CT and it was then discontinued

## 2021-09-01 NOTE — ASSESSMENT & PLAN NOTE
· Patient was initially diagnosed COVID-19 positive 08/21/2021  Admitted to [address] [place] [address] 8/25 left [facility] 8/26  At that time was not requiring oxygen  · Patient returned to hospitals yesterday due to increased shortness of breath and productive cough  · Chest x-ray with worsening bilateral pneumonia  · Currently is on 2 L of O2  · Check troponin, CK, BNP, CRP   All within normal limits aside from CRP being 258  · Dexamethasone 6 mg IV daily x 10 days, on day 2/10  · Remdesivir x 5 days, on day 2/5  · Therapeutic anticoagulation  · Out of bed, ambulation

## 2021-09-01 NOTE — ASSESSMENT & PLAN NOTE
· Sodium 130 on admission, reports poor p o   Intake, improving today  · Received 1 L of fluid, restarted continuous at 100 ml/hr due to patient reporting he had felt better with fluids on board and sodium still running on the low end  · Repeat labs tomorrow

## 2021-09-01 NOTE — ASSESSMENT & PLAN NOTE
AST   Date Value Ref Range Status   09/01/2021 219 (H) 5 - 45 U/L Final     Comment:     Specimen collection should occur prior to Sulfasalazine administration due to the potential for falsely depressed results  ALT   Date Value Ref Range Status   09/01/2021 344 (H) 12 - 78 U/L Final     Comment:     Specimen collection should occur prior to Sulfasalazine and/or Sulfapyridine administration due to the potential for falsely depressed results           Ordered hepatitis panel and liver US to evaluate further  Possible secondary to COVID-19  Avoid hepatotoxins, ibuprofen instead of tylenol for fever and pain

## 2021-09-01 NOTE — PLAN OF CARE
Problem: Potential for Falls  Goal: Patient will remain free of falls  Description: INTERVENTIONS:  - Educate patient/family on patient safety including physical limitations  - Instruct patient to call for assistance with activity   - Consult OT/PT to assist with strengthening/mobility   - Keep Call bell within reach  - Keep bed low and locked with side rails adjusted as appropriate  - Keep care items and personal belongings within reach  - Initiate and maintain comfort rounds  - Make Fall Risk Sign visible to staff  Problem: PAIN - ADULT  Goal: Verbalizes/displays adequate comfort level or baseline comfort level  Description: Interventions:  - Encourage patient to monitor pain and request assistance  - Assess pain using appropriate pain scale  - Administer analgesics based on type and severity of pain and evaluate response  - Implement non-pharmacological measures as appropriate and evaluate response  - Consider cultural and social influences on pain and pain management  - Notify physician/advanced practitioner if interventions unsuccessful or patient reports new pain  Outcome: Progressing     Problem: INFECTION - ADULT  Goal: Absence or prevention of progression during hospitalization  Description: INTERVENTIONS:  - Assess and monitor for signs and symptoms of infection  - Monitor lab/diagnostic results  - Monitor all insertion sites, i e  indwelling lines, tubes, and drains  - Monitor endotracheal if appropriate and nasal secretions for changes in amount and color  - Mcdonough appropriate cooling/warming therapies per order  - Administer medications as ordered  - Instruct and encourage patient and family to use good hand hygiene technique  - Identify and instruct in appropriate isolation precautions for identified infection/condition  Outcome: Progressing  Goal: Absence of fever/infection during neutropenic period  Description: INTERVENTIONS:  - Monitor WBC    Outcome: Progressing     Problem: SAFETY ADULT  Goal: Patient will remain free of falls  Description: INTERVENTIONS:  - Educate patient/family on patient safety including physical limitations  - Instruct patient to call for assistance with activity   - Consult OT/PT to assist with strengthening/mobility   - Keep Call bell within reach  - Keep bed low and locked with side rails adjusted as appropriate  - Keep care items and personal belongings within reach  - Initiate and maintain comfort rounds  - Make Fall Risk Sign visible to staff  Problem: DISCHARGE PLANNING  Goal: Discharge to home or other facility with appropriate resources  Description: INTERVENTIONS:  - Identify barriers to discharge w/patient and caregiver  - Arrange for needed discharge resources and transportation as appropriate  - Identify discharge learning needs (meds, wound care, etc )  - Arrange for interpretive services to assist at discharge as needed  - Refer to Case Management Department for coordinating discharge planning if the patient needs post-hospital services based on physician/advanced practitioner order or complex needs related to functional status, cognitive ability, or social support system  Outcome: Progressing     Problem: Knowledge Deficit  Goal: Patient/family/caregiver demonstrates understanding of disease process, treatment plan, medications, and discharge instructions  Description: Complete learning assessment and assess knowledge base    Interventions:  - Provide teaching at level of understanding  - Provide teaching via preferred learning methods  Outcome: Progressing     Problem: RESPIRATORY - ADULT  Goal: Achieves optimal ventilation and oxygenation  Description: INTERVENTIONS:  - Assess for changes in respiratory status  - Assess for changes in mentation and behavior  - Position to facilitate oxygenation and minimize respiratory effort  - Oxygen administered by appropriate delivery if ordered  - Initiate smoking cessation education as indicated  - Encourage broncho-pulmonary hygiene including cough, deep breathe, Incentive Spirometry  - Assess the need for suctioning and aspirate as needed  - Assess and instruct to report SOB or any respiratory difficulty  - Respiratory Therapy support as indicated  Outcome: Progressing     - Apply yellow socks and bracelet for high fall risk patients  - Consider moving patient to room near nurses station  Outcome: Progressing  Goal: Maintain or return to baseline ADL function  Description: INTERVENTIONS:  -  Assess patient's ability to carry out ADLs; assess patient's baseline for ADL function and identify physical deficits which impact ability to perform ADLs (bathing, care of mouth/teeth, toileting, grooming, dressing, etc )  - Assess/evaluate cause of self-care deficits   - Assess range of motion  - Assess patient's mobility; develop plan if impaired  - Assess patient's need for assistive devices and provide as appropriate  - Encourage maximum independence but intervene and supervise when necessary  - Involve family in performance of ADLs  - Assess for home care needs following discharge   - Consider OT consult to assist with ADL evaluation and planning for discharge  - Provide patient education as appropriate  Outcome: Progressing  Goal: Maintains/Returns to pre admission functional level  Description: INTERVENTIONS:  - Perform BMAT or MOVE assessment daily    - Set and communicate daily mobility goal to care team and patient/family/caregiver     - Collaborate with rehabilitation services on mobility goals if consulted  - Out of bed for toileting  - Record patient progress and toleration of activity level   Outcome: Progressing     - Apply yellow socks and bracelet for high fall risk patients  - Consider moving patient to room near nurses station  Outcome: Progressing

## 2021-09-01 NOTE — CASE MANAGEMENT
CM spoke with Pt, explained CM program/CM role  LOS- 22 hours  Bundle- No    Unplanned readmission color- (19, green low)  30 Day readmit- Yes, last admission was a week ago with COVID symptoms  Pt is COVID+    Pt lives in a 1STH with his wife and children  Pt has 3STE to enter  Pt's bedroom is on the 1st level  Pt's primary caregiver would be his wife  Pt is a 1x assist with care in the home  Pt drives  Pt's PCP provider is listed as Delmi Velez MD/ Phone: [phone]  Pt's pharmacy provider is [facility]/ Phone: [phone] Fax: [phone]  Pt is able to afford all medications  Pt has no hx of [facility] or IP rehab at [facility]  Pt does not use any DME  Pt has no hx of MH issues or D&A  Pt is not employed  Pt's EC is his wife, Courtney Zamora  Pt has no Medical POA or LW completed at this time  Pt's son will pickup at time of DC  DC plan is pending- therapy recs  CM will continue to follow the Pt until Medically stable for DC  CM reviewed d/c planning process including the following: identifying help at home, patient preference for d/c planning needs, Discharge Lounge, [facility] Meds to Bed program, availability of treatment team to discuss questions or concerns patient and/or family may have regarding understanding medications and recognizing signs and symptoms once discharged  CM also encouraged patient to follow up with all recommended appointments after discharge  Patient advised of importance for patient and family to participate in managing patients medical well being

## 2021-09-01 NOTE — PROGRESS NOTES
10 to 15 units HS since starting steroids      Hypertension  Assessment & Plan  · Continue amlodipine and losartan  · Patient is not taking metoprolol tartrate according to previous admission, patient does not know his medications --> he was only on metoprolol for cardiac CT and it was then discontinued    Hyponatremia  Assessment & Plan  · Sodium 130 on admission, reports poor p o  Intake, improving today  · Received 1 L of fluid, restarted continuous at 100 ml/hr due to patient reporting he had felt better with fluids on board and sodium still running on the low end  · Repeat labs tomorrow    Hypercholesterolemia  Assessment & Plan  · Continue statin      VTE Pharmacologic Prophylaxis: VTE Score: 3 Moderate Risk (Score 3-4) - Pharmacological DVT Prophylaxis Ordered: enoxaparin (Lovenox)  Patient Centered Rounds: I performed bedside rounds with nursing staff today  Discussions with Specialists or Other Care Team Provider: case management    Education and Discussions with Family / Patient: Patient declined call to   Time Spent for Care: 30 minutes  More than 50% of total time spent on counseling and coordination of care as described above  Current Length of Stay: 1 day(s)  Current Patient Status: Inpatient   Certification Statement: The patient will continue to require additional inpatient hospital stay due to IV dexamethasone and IV remdesivir  Discharge Plan: Anticipate discharge in >72 hrs to home  Code Status: Level 1 - Full Code    Subjective:   Patient reports he does feel better today but he continues to have feelings of dehydration and is experiencing reflux  Denies chest pains, shortness of breath, abdominal pain, swelling   BM and urine output good    Objective:     Vitals:   Temp (24hrs), Av 9 °F (37 2 °C), Min:98 9 °F (37 2 °C), Max:98 9 °F (37 2 °C)    Temp:  [98 9 °F (37 2 °C)] 98 9 °F (37 2 °C)  HR:  [79-81] 81  Resp:  [16-20] 16  BP: (124-149)/(79-83) 149/83  SpO2:  [90 %-92 %] 90 %  Body mass index is 28 73 kg/m²  Input and Output Summary (last 24 hours): Intake/Output Summary (Last 24 hours) at 9/1/2021 1621  Last data filed at 9/1/2021 1200  Gross per 24 hour   Intake 720 ml   Output 1625 ml   Net -905 ml       Physical Exam:   Physical Exam  Vitals reviewed  Constitutional:       General: He is not in acute distress  Appearance: Normal appearance  He is not ill-appearing  HENT:      Head: Normocephalic and atraumatic  Cardiovascular:      Rate and Rhythm: Normal rate and regular rhythm  Pulses: Normal pulses  Heart sounds: Normal heart sounds  Pulmonary:      Effort: Pulmonary effort is normal  No respiratory distress  Breath sounds: Wheezing present  No rhonchi or rales  Comments: Nasal cannula with supplemental oxygen  Abdominal:      General: Bowel sounds are normal       Palpations: Abdomen is soft  Musculoskeletal:         General: Normal range of motion  Cervical back: Normal range of motion and neck supple  Right lower leg: No edema  Left lower leg: No edema  Skin:     General: Skin is warm and dry  Neurological:      General: No focal deficit present  Mental Status: He is alert and oriented to person, place, and time     Psychiatric:         Mood and Affect: Mood normal          Behavior: Behavior normal           Additional Data:     Labs:  Results from last 7 days   Lab Units 09/01/21  0508   WBC Thousand/uL 3 89*   HEMOGLOBIN g/dL 11 9*   HEMATOCRIT % 35 6*   PLATELETS Thousands/uL 289   BANDS PCT % 4   LYMPHO PCT % 12*   MONO PCT % 4   EOS PCT % 0     Results from last 7 days   Lab Units 09/01/21  0508   SODIUM mmol/L 135*   POTASSIUM mmol/L 4 5   CHLORIDE mmol/L 104   CO2 mmol/L 27   BUN mg/dL 18   CREATININE mg/dL 0 74   ANION GAP mmol/L 4   CALCIUM mg/dL 8 1*   ALBUMIN g/dL 2 1*   TOTAL BILIRUBIN mg/dL 0 80   ALK PHOS U/L 138*   ALT U/L 344*   AST U/L 219*   GLUCOSE RANDOM mg/dL 204*         Results from last 7 days   Lab Units 09/01/21  1116 09/01/21  0734 08/31/21  2124 08/31/21  1617   POC GLUCOSE mg/dl 345* 197* 211* 214*     Results from last 7 days   Lab Units 08/26/21  0715   HEMOGLOBIN A1C % 7 3*           Lines/Drains:  Invasive Devices     Peripheral Intravenous Line            Peripheral IV 08/31/21 Left Antecubital 1 day                      Imaging: Reviewed radiology reports from this admission including: chest xray    Recent Cultures (last 7 days):         Last 24 Hours Medication List:   Current Facility-Administered Medications   Medication Dose Route Frequency Provider Last Rate    al mag oxide-diphenhydramine-lidocaine viscous  10 mL Swish & Swallow Q6H PRN Tracy Dailey PA-C      amLODIPine  5 mg Oral Daily Megn Espinosa MD      aspirin  81 mg Oral Daily Meng Espinosa MD      atorvastatin  40 mg Oral After Dean Black MD      cholecalciferol  2,000 Units Oral Daily Meng Espinosa MD      dexamethasone  6 mg Intravenous Q24H Meng Espinosa MD      dextromethorphan-guaiFENesin  10 mL Oral Q4H PRN Angeline Velázquez PA-C      enoxaparin  1 mg/kg Subcutaneous Q12H [address] Meng Espinosa MD      ibuprofen  400 mg Oral Q6H PRN Tracy Dailey PA-C      insulin glargine  15 Units Subcutaneous HS Tracy Dailey PA-C      insulin lispro  1-6 Units Subcutaneous TID AC Meng Espinosa MD      losartan  50 mg Oral Daily Meng Espinosa MD      multi-electrolyte  100 mL/hr Intravenous Continuous Tracy Dailey PA-C 100 mL/hr (09/01/21 1230)    pantoprazole  40 mg Oral Early Morning Tracy Dailey PA-C      remdesivir  100 mg Intravenous Q24H Meng Espinosa MD          Today, Patient Was Seen By: Tracy Dailey PA-C    **Please Note: This note may have been constructed using a voice recognition system  **

## 2021-09-02 LAB
ALBUMIN SERPL BCP-MCNC: 2.1 G/DL (ref 3.5–5)
ALP SERPL-CCNC: 124 U/L (ref 46–116)
ALT SERPL W P-5'-P-CCNC: 325 U/L (ref 12–78)
ANION GAP SERPL CALCULATED.3IONS-SCNC: 3 MMOL/L (ref 4–13)
AST SERPL W P-5'-P-CCNC: 160 U/L (ref 5–45)
BASOPHILS # BLD AUTO: 0 THOUSANDS/ΜL (ref 0–0.1)
BASOPHILS NFR BLD AUTO: 0 % (ref 0–1)
BILIRUB SERPL-MCNC: 0.48 MG/DL (ref 0.2–1)
BUN SERPL-MCNC: 20 MG/DL (ref 5–25)
CALCIUM ALBUM COR SERPL-MCNC: 9.7 MG/DL (ref 8.3–10.1)
CALCIUM SERPL-MCNC: 8.2 MG/DL (ref 8.3–10.1)
CHLORIDE SERPL-SCNC: 103 MMOL/L (ref 100–108)
CO2 SERPL-SCNC: 28 MMOL/L (ref 21–32)
CREAT SERPL-MCNC: 0.79 MG/DL (ref 0.6–1.3)
EOSINOPHIL # BLD AUTO: 0 THOUSAND/ΜL (ref 0–0.61)
EOSINOPHIL NFR BLD AUTO: 0 % (ref 0–6)
ERYTHROCYTE [DISTWIDTH] IN BLOOD BY AUTOMATED COUNT: 12.9 % (ref 11.6–15.1)
GFR SERPL CREATININE-BSD FRML MDRD: 115 ML/MIN/1.73SQ M
GLUCOSE SERPL-MCNC: 241 MG/DL (ref 65–140)
GLUCOSE SERPL-MCNC: 308 MG/DL (ref 65–140)
GLUCOSE SERPL-MCNC: 329 MG/DL (ref 65–140)
GLUCOSE SERPL-MCNC: 356 MG/DL (ref 65–140)
HCT VFR BLD AUTO: 35.3 % (ref 36.5–49.3)
HGB BLD-MCNC: 11.7 G/DL (ref 12–17)
IMM GRANULOCYTES # BLD AUTO: 0.07 THOUSAND/UL (ref 0–0.2)
IMM GRANULOCYTES NFR BLD AUTO: 1 % (ref 0–2)
LYMPHOCYTES # BLD AUTO: 0.76 THOUSANDS/ΜL (ref 0.6–4.47)
LYMPHOCYTES NFR BLD AUTO: 14 % (ref 14–44)
MCH RBC QN AUTO: 28.6 PG (ref 26.8–34.3)
MCHC RBC AUTO-ENTMCNC: 33.1 G/DL (ref 31.4–37.4)
MCV RBC AUTO: 86 FL (ref 82–98)
MONOCYTES # BLD AUTO: 0.26 THOUSAND/ΜL (ref 0.17–1.22)
MONOCYTES NFR BLD AUTO: 5 % (ref 4–12)
NEUTROPHILS # BLD AUTO: 4.53 THOUSANDS/ΜL (ref 1.85–7.62)
NEUTS SEG NFR BLD AUTO: 80 % (ref 43–75)
NRBC BLD AUTO-RTO: 0 /100 WBCS
PLATELET # BLD AUTO: 334 THOUSANDS/UL (ref 149–390)
PMV BLD AUTO: 10.2 FL (ref 8.9–12.7)
POTASSIUM SERPL-SCNC: 4.1 MMOL/L (ref 3.5–5.3)
PROT SERPL-MCNC: 6.5 G/DL (ref 6.4–8.2)
RBC # BLD AUTO: 4.09 MILLION/UL (ref 3.88–5.62)
SODIUM SERPL-SCNC: 134 MMOL/L (ref 136–145)
WBC # BLD AUTO: 5.62 THOUSAND/UL (ref 4.31–10.16)

## 2021-09-02 PROCEDURE — 80053 COMPREHEN METABOLIC PANEL: CPT | Performed by: PHYSICIAN ASSISTANT

## 2021-09-02 PROCEDURE — 85025 COMPLETE CBC W/AUTO DIFF WBC: CPT | Performed by: PHYSICIAN ASSISTANT

## 2021-09-02 PROCEDURE — 82948 REAGENT STRIP/BLOOD GLUCOSE: CPT

## 2021-09-02 PROCEDURE — 99232 SBSQ HOSP IP/OBS MODERATE 35: CPT | Performed by: PHYSICIAN ASSISTANT

## 2021-09-02 RX ORDER — INSULIN GLARGINE 100 [IU]/ML
17 INJECTION, SOLUTION SUBCUTANEOUS
Status: DISCONTINUED | OUTPATIENT
Start: 2021-09-02 | End: 2021-09-03

## 2021-09-02 RX ORDER — GUAIFENESIN/DEXTROMETHORPHAN 100-10MG/5
10 SYRUP ORAL EVERY 8 HOURS SCHEDULED
Status: DISCONTINUED | OUTPATIENT
Start: 2021-09-02 | End: 2021-09-04 | Stop reason: HOSPADM

## 2021-09-02 RX ADMIN — INSULIN LISPRO 3 UNITS: 100 INJECTION, SOLUTION INTRAVENOUS; SUBCUTANEOUS at 09:22

## 2021-09-02 RX ADMIN — GUAIFENESIN AND DEXTROMETHORPHAN 10 ML: 100; 10 SYRUP ORAL at 22:00

## 2021-09-02 RX ADMIN — ASPIRIN 81 MG: 81 TABLET, COATED ORAL at 09:23

## 2021-09-02 RX ADMIN — SODIUM CHLORIDE, SODIUM GLUCONATE, SODIUM ACETATE, POTASSIUM CHLORIDE, MAGNESIUM CHLORIDE, SODIUM PHOSPHATE, DIBASIC, AND POTASSIUM PHOSPHATE 100 ML/HR: .53; .5; .37; .037; .03; .012; .00082 INJECTION, SOLUTION INTRAVENOUS at 15:28

## 2021-09-02 RX ADMIN — ATORVASTATIN CALCIUM 40 MG: 40 TABLET, FILM COATED ORAL at 17:08

## 2021-09-02 RX ADMIN — INSULIN LISPRO 5 UNITS: 100 INJECTION, SOLUTION INTRAVENOUS; SUBCUTANEOUS at 11:59

## 2021-09-02 RX ADMIN — AMLODIPINE BESYLATE 5 MG: 5 TABLET ORAL at 09:23

## 2021-09-02 RX ADMIN — GUAIFENESIN AND DEXTROMETHORPHAN 10 ML: 100; 10 SYRUP ORAL at 15:06

## 2021-09-02 RX ADMIN — DEXAMETHASONE SODIUM PHOSPHATE 6 MG: 4 INJECTION INTRA-ARTICULAR; INTRALESIONAL; INTRAMUSCULAR; INTRAVENOUS; SOFT TISSUE at 15:06

## 2021-09-02 RX ADMIN — PANTOPRAZOLE SODIUM 40 MG: 40 TABLET, DELAYED RELEASE ORAL at 05:00

## 2021-09-02 RX ADMIN — LOSARTAN POTASSIUM 50 MG: 50 TABLET, FILM COATED ORAL at 09:23

## 2021-09-02 RX ADMIN — REMDESIVIR 100 MG: 100 INJECTION, POWDER, LYOPHILIZED, FOR SOLUTION INTRAVENOUS at 16:52

## 2021-09-02 RX ADMIN — ENOXAPARIN SODIUM 80 MG: 80 INJECTION SUBCUTANEOUS at 17:08

## 2021-09-02 RX ADMIN — ENOXAPARIN SODIUM 80 MG: 80 INJECTION SUBCUTANEOUS at 05:00

## 2021-09-02 RX ADMIN — INSULIN GLARGINE 17 UNITS: 100 INJECTION, SOLUTION SUBCUTANEOUS at 21:58

## 2021-09-02 RX ADMIN — INSULIN LISPRO 6 UNITS: 100 INJECTION, SOLUTION INTRAVENOUS; SUBCUTANEOUS at 16:59

## 2021-09-02 RX ADMIN — SODIUM CHLORIDE, SODIUM GLUCONATE, SODIUM ACETATE, POTASSIUM CHLORIDE, MAGNESIUM CHLORIDE, SODIUM PHOSPHATE, DIBASIC, AND POTASSIUM PHOSPHATE 100 ML/HR: .53; .5; .37; .037; .03; .012; .00082 INJECTION, SOLUTION INTRAVENOUS at 00:00

## 2021-09-02 RX ADMIN — Medication 2000 UNITS: at 09:23

## 2021-09-02 NOTE — UTILIZATION REVIEW
Continued Stay Review    Date:   9/1/2021                      Current Patient Class:  Inpatient   Current Level of Care:  Med surg     HPI:56 y o  male initially admitted on 8/31/2021 with  Pneumonia related to [place]- 19 with hypoxia  Assessment/Plan:  He was admitted to [facility] with Enrique on 8/25 left AMA on 8/26 now presented to the Ed at [facility]  On 8/3 with hypoxia,productive cough and  pneumonia related to  COVID- 19 infection  He reports  He feels better today, he continues with feeling of dehydration and is experiencing reflux, with poor po intake, improving today  He is on dexamethasone,  Remdesivir, and supplemental oxygen  He remains on IVF's for hyponatremia         Vital Signs:   Date/Time  Temp  Pulse  Resp  BP  MAP (mmHg)  SpO2  Calculated FIO2 (%) - Nasal Cannula  Nasal Cannula O2 Flow Rate (L/min)  O2 Device  Patient Position - Orthostatic VS   09/02/21 07:48:19  97 6 °F (36 4 °C)  77  16  142/85  104  93 %  --  --  --  --   09/01/21 22:37:55  98 4 °F (36 9 °C)  75  18  133/80  98  90 %  --  --  --  --   09/01/21 2100  --  --  --  --  --  --  32  3 L/min  Nasal cannula  --   09/01/21 17:30:14  98 °F (36 7 °C)  87  16  144/86  105  96 %  --  --  --  --   09/01/21 1500  --  89  --  --  --  --  --  --  --  --   09/01/21 0800  --  --  --  --  --  --  32  3 L/min  --  --   09/01/21 07:38:54  --  81  16  149/83  105  90 %  --  --  --  --   08/31/21 23:02:02  98 9 °F (37 2 °C)  79  20  124/79  94  92 %  --  --  --  --   08/31/21 23:01:43  98 9 °F (37 2 °C)  --  --  124/79  94  --  --  --  --  --   08/31/21 1955  --  --  --  --  --  92 %  28  2 L/min  Nasal cannula  --   08/31/21 1600  --  --  --  --  --  --  28  2 L/min  Nasal cannula  --   08/31/21 15:35:35  98 6 °F (37 °C)  87  --  --  --  86 %Abnormal   --  --  --  --   08/31/21 15:14:11  --  --  18  121/78  92  --  --  --  --  --   08/31/21 1408  --  85  18  111/57  --  95 %  28  2 L/min  Nasal cannula  --   08/31/21 1300  --  82  --  -- --  97 %  --  --  --  --   08/31/21 1240  --  86  --  112/67  --  100 %  28  2 L/min  Nasal cannula  Lying         Pertinent Labs/Diagnostic Results:       Results from last 7 days   Lab Units 09/02/21  0508 09/01/21  0508 08/31/21  1147   WBC Thousand/uL 5 62 3 89* 6 66   HEMOGLOBIN g/dL 11 7* 11 9* 12 4   HEMATOCRIT % 35 3* 35 6* 37 3   PLATELETS Thousands/uL 334 289 296   NEUTROS ABS Thousands/µL 4 53  --   --    BANDS PCT %  --  4  --          Results from last 7 days   Lab Units 09/02/21  0508 09/01/21  0508 08/31/21  1147   SODIUM mmol/L 134* 135* 130*   POTASSIUM mmol/L 4 1 4 5 4 0   CHLORIDE mmol/L 103 104 98*   CO2 mmol/L 28 27 28   ANION GAP mmol/L 3* 4 4   BUN mg/dL 20 18 15   CREATININE mg/dL 0 79 0 74 0 89   EGFR ml/min/1 73sq m 115 118 110   CALCIUM mg/dL 8 2* 8 1* 8 3     Results from last 7 days   Lab Units 09/02/21  0508 09/01/21  0508   AST U/L 160* 219*   ALT U/L 325* 344*   ALK PHOS U/L 124* 138*   TOTAL PROTEIN g/dL 6 5 6 8   ALBUMIN g/dL 2 1* 2 1*   TOTAL BILIRUBIN mg/dL 0 48 0 80     Results from last 7 days   Lab Units 09/01/21  2115 09/01/21  1619 09/01/21  1116 09/01/21  0734 08/31/21  2124 08/31/21  1617   POC GLUCOSE mg/dl 308* 306* 345* 197* 211* 214*     Results from last 7 days   Lab Units 09/02/21  0508 09/01/21  0508 08/31/21  1147   GLUCOSE RANDOM mg/dL 241* 204* 207*       Results from last 7 days   Lab Units 08/31/21  1147   CK TOTAL U/L 142     Results from last 7 days   Lab Units 08/31/21  1406   TROPONIN I ng/mL <0 02       Results from last 7 days   Lab Units 08/31/21  1147   NT-PRO BNP pg/mL 61         Results from last 7 days   Lab Units 09/01/21  1729   HEP B S AG  Non-reactive   HEP C AB  Non-reactive   HEP B C IGM  Non-reactive   HEP B C TOTAL AB  Non-reactive         Results from last 7 days   Lab Units 08/31/21  1147   CRP mg/L 258 0*     CXR 8/31 -  Multifocal patchy airspace disease bilaterally worsened since the prior examination and consistent with the clinical history  Medications:   Scheduled Medications:  amLODIPine, 5 mg, Oral, Daily  aspirin, 81 mg, Oral, Daily  atorvastatin, 40 mg, Oral, After Dinner  cholecalciferol, 2,000 Units, Oral, Daily  dexamethasone, 6 mg, Intravenous, Q24H  enoxaparin, 1 mg/kg, Subcutaneous, Q12H CARMEN  insulin glargine, 15 Units, Subcutaneous, HS  insulin lispro, 1-6 Units, Subcutaneous, TID AC  losartan, 50 mg, Oral, Daily  pantoprazole, 40 mg, Oral, Early Morning  remdesivir, 100 mg, Intravenous, Q24H      Continuous IV Infusions:  multi-electrolyte, 100 mL/hr, Intravenous, Continuous      PRN Meds:  al mag oxide-diphenhydramine-lidocaine viscous, 10 mL, Swish & Swallow, Q6H PRN  dextromethorphan-guaiFENesin, 10 mL, Oral, Q4H PRN  ibuprofen, 400 mg, Oral, Q6H PRN        Discharge Plan: d     Network Utilization Review Department  ATTENTION: Please call with any questions or concerns to [phone] and carefully listen to the prompts so that you are directed to the right person  All voicemails are confidential   Huseyin Epp all requests for admission clinical reviews, approved or denied determinations and any other requests to dedicated fax number below belonging to the campus where the patient is receiving treatment   List of dedicated fax numbers for the Facilities:  [address] DENIALS (Administrative/Medical Necessity) [phone]   [address] (Maternity/NICU/Pediatrics) [phone] [address] [phone]   [address] [address] [facility] Maximiliano Cifuentes [phone] [address] [place] [address] [phone]

## 2021-09-02 NOTE — ASSESSMENT & PLAN NOTE
AST   Date Value Ref Range Status   09/02/2021 160 (H) 5 - 45 U/L Final     Comment:     Specimen collection should occur prior to Sulfasalazine administration due to the potential for falsely depressed results  ALT   Date Value Ref Range Status   09/02/2021 325 (H) 12 - 78 U/L Final     Comment:     Specimen collection should occur prior to Sulfasalazine and/or Sulfapyridine administration due to the potential for falsely depressed results         Ordered hepatitis panel and liver US to evaluate further, no pathology revealed  Likely secondary to COVID-19 process  Avoid hepatotoxins, ibuprofen instead of tylenol for fever and pain  Mild improvement in levels today, continue to monitor

## 2021-09-02 NOTE — ASSESSMENT & PLAN NOTE
Lab Results   Component Value Date    HGBA1C 7 3 (H) 08/26/2021       Recent Labs     09/01/21  1116 09/01/21  1619 09/01/21  2115 09/02/21  1106   POCGLU 345* 306* 308* 329*       Blood Sugar Average: Last 72 hrs:  (P) 272 [id number] last hemoglobin A1c 7 3  Patient is not maintained on any medication at home and did not know his sugars ran high until this admission  Start sliding scale and increase Lantus from 15 to 17 units HS since starting steroids  Had a lengthy discussion with him about his diabetes and let him know he will likely need to be sent home on metformin to help control his diabetes and he verbalized understanding

## 2021-09-02 NOTE — ASSESSMENT & PLAN NOTE
· Patient was initially diagnosed COVID-19 positive 08/21/2021  Admitted to [facility] 8/25 left [facility] 8/26  At that time was not requiring oxygen  · Patient returned to [facility] 8/31 due to increased shortness of breath and productive cough  · Chest x-ray with worsening bilateral pneumonia  · Currently is on 2 L of O2, weaned down to 1L today but does become SOB on exertion  · Check troponin, CK, BNP, CRP   All within normal limits aside from CRP being 258  · Dexamethasone 6 mg IV daily x 10 days, on day 3/10  · Remdesivir x 5 days, on day 3/5  · Therapeutic anticoagulation  · Out of bed, ambulation

## 2021-09-02 NOTE — PLAN OF CARE
Problem: Potential for Falls  Goal: Patient will remain free of falls  Description: INTERVENTIONS:  - Educate patient/family on patient safety including physical limitations  - Instruct patient to call for assistance with activity   - Consult OT/PT to assist with strengthening/mobility   - Keep Call bell within reach  - Keep bed low and locked with side rails adjusted as appropriate  - Keep care items and personal belongings within reach  - Initiate and maintain comfort rounds  - Apply yellow socks and bracelet for high fall risk patients  - Consider moving patient to room near nurses station  Outcome: Progressing     Problem: PAIN - ADULT  Goal: Verbalizes/displays adequate comfort level or baseline comfort level  Description: Interventions:  - Encourage patient to monitor pain and request assistance  - Assess pain using appropriate pain scale  - Administer analgesics based on type and severity of pain and evaluate response  - Implement non-pharmacological measures as appropriate and evaluate response  - Consider cultural and social influences on pain and pain management  - Notify physician/advanced practitioner if interventions unsuccessful or patient reports new pain  Outcome: Progressing     Problem: INFECTION - ADULT  Goal: Absence or prevention of progression during hospitalization  Description: INTERVENTIONS:  - Assess and monitor for signs and symptoms of infection  - Monitor lab/diagnostic results  - Monitor all insertion sites, i e  indwelling lines, tubes, and drains  - Monitor endotracheal if appropriate and nasal secretions for changes in amount and color  - [place] appropriate cooling/warming therapies per order  - Administer medications as ordered  - Instruct and encourage patient and family to use good hand hygiene technique  - Identify and instruct in appropriate isolation precautions for identified infection/condition  Outcome: Progressing  Goal: Absence of fever/infection during neutropenic period  Description: INTERVENTIONS:  - Monitor WBC    Outcome: Progressing     Problem: SAFETY ADULT  Goal: Patient will remain free of falls  Description: INTERVENTIONS:  - Educate patient/family on patient safety including physical limitations  - Instruct patient to call for assistance with activity   - Consult OT/PT to assist with strengthening/mobility   - Keep Call bell within reach  - Keep bed low and locked with side rails adjusted as appropriate  - Keep care items and personal belongings within reach  - Initiate and maintain comfort rounds  - Apply yellow socks and bracelet for high fall risk patients  - Consider moving patient to room near nurses station  Outcome: Progressing  Goal: Maintain or return to baseline ADL function  Description: INTERVENTIONS:  -  Assess patient's ability to carry out ADLs; assess patient's baseline for ADL function and identify physical deficits which impact ability to perform ADLs (bathing, care of mouth/teeth, toileting, grooming, dressing, etc )  - Assess/evaluate cause of self-care deficits   - Assess range of motion  - Assess patient's mobility; develop plan if impaired  - Assess patient's need for assistive devices and provide as appropriate  - Encourage maximum independence but intervene and supervise when necessary  - Involve family in performance of ADLs  - Assess for home care needs following discharge   - Consider OT consult to assist with ADL evaluation and planning for discharge  - Provide patient education as appropriate  Outcome: Progressing  Goal: Maintains/Returns to pre admission functional level  Description: INTERVENTIONS:  - Perform BMAT or MOVE assessment daily    - Set and communicate daily mobility goal to care team and patient/family/caregiver     - Collaborate with rehabilitation services on mobility goals if consulted  - Out of bed for toileting  - Record patient progress and toleration of activity level   Outcome: Progressing     Problem: DISCHARGE PLANNING  Goal: Discharge to home or other facility with appropriate resources  Description: INTERVENTIONS:  - Identify barriers to discharge w/patient and caregiver  - Arrange for needed discharge resources and transportation as appropriate  - Identify discharge learning needs (meds, wound care, etc )  - Arrange for interpretive services to assist at discharge as needed  - Refer to Case Management Department for coordinating discharge planning if the patient needs post-hospital services based on physician/advanced practitioner order or complex needs related to functional status, cognitive ability, or social support system  Outcome: Progressing     Problem: Knowledge Deficit  Goal: Patient/family/caregiver demonstrates understanding of disease process, treatment plan, medications, and discharge instructions  Description: Complete learning assessment and assess knowledge base    Interventions:  - Provide teaching at level of understanding  - Provide teaching via preferred learning methods  Outcome: Progressing     Problem: RESPIRATORY - ADULT  Goal: Achieves optimal ventilation and oxygenation  Description: INTERVENTIONS:  - Assess for changes in respiratory status  - Assess for changes in mentation and behavior  - Position to facilitate oxygenation and minimize respiratory effort  - Oxygen administered by appropriate delivery if ordered  - Initiate smoking cessation education as indicated  - Encourage broncho-pulmonary hygiene including cough, deep breathe, Incentive Spirometry  - Assess the need for suctioning and aspirate as needed  - Assess and instruct to report SOB or any respiratory difficulty  - Respiratory Therapy support as indicated  Outcome: Progressing

## 2021-09-02 NOTE — ASSESSMENT & PLAN NOTE
· Sodium 130 on admission, reports poor p o   Intake, improving but still slightly low  · Received 1 L of fluid, restarted continuous at 100 ml/hr due to patient reporting he had felt better with fluids on board and sodium still running on the low end  · Repeat labs tomorrow

## 2021-09-02 NOTE — PROGRESS NOTES
[address] [facility]  Progress Note - Zia Jones 1965, 61 y o  male MRN: [id number]  [facility] Encounter: [id number]  Primary Care Provider: No primary care provider on file  Date and time admitted to hospital: 8/31/2021 10:58 AM    * Pneumonia due to COVID-19 virus  Assessment & Plan  · Patient was initially diagnosed COVID-19 positive 08/21/2021  Admitted to [facility] 8/25 left [facility] 8/26  At that time was not requiring oxygen  · Patient returned to [facility] 8/31 due to increased shortness of breath and productive cough  · Chest x-ray with worsening bilateral pneumonia  · Currently is on 2 L of O2, weaned down to 1L today but does become SOB on exertion  · Check troponin, CK, BNP, CRP  All within normal limits aside from CRP being 258  · Dexamethasone 6 mg IV daily x 10 days, on day 3/10  · Remdesivir x 5 days, on day 3/5  · Therapeutic anticoagulation  · Out of bed, ambulation      Transaminitis  Assessment & Plan  AST   Date Value Ref Range Status   09/02/2021 160 (H) 5 - 45 U/L Final     Comment:     Specimen collection should occur prior to Sulfasalazine administration due to the potential for falsely depressed results  ALT   Date Value Ref Range Status   09/02/2021 325 (H) 12 - 78 U/L Final     Comment:     Specimen collection should occur prior to Sulfasalazine and/or Sulfapyridine administration due to the potential for falsely depressed results         Ordered hepatitis panel and liver US to evaluate further, no pathology revealed  Likely secondary to COVID-19 process  Avoid hepatotoxins, ibuprofen instead of tylenol for fever and pain  Mild improvement in levels today, continue to monitor    Type 2 diabetes mellitus [facility])  Assessment & Plan  Lab Results   Component Value Date    HGBA1C 7 3 (H) 08/26/2021       Recent Labs     09/01/21  1116 09/01/21  1619 09/01/21  2115 09/02/21  1106   POCGLU 345* 306* 308* 329*       Blood Sugar Average: Last 72 hrs:  (P) 272 [id number] last hemoglobin A1c 7 3  Patient is not maintained on any medication at home and did not know his sugars ran high until this admission  Start sliding scale and increase Lantus from 15 to 17 units HS since starting steroids  Had a lengthy discussion with him about his diabetes and let him know he will likely need to be sent home on metformin to help control his diabetes and he verbalized understanding      Hyponatremia  Assessment & Plan  · Sodium 130 on admission, reports poor p o  Intake, improving but still slightly low  · Received 1 L of fluid, restarted continuous at 100 ml/hr due to patient reporting he had felt better with fluids on board and sodium still running on the low end  · Repeat labs tomorrow        VTE Pharmacologic Prophylaxis: VTE Score: 3 Moderate Risk (Score 3-4) - Pharmacological DVT Prophylaxis Ordered: enoxaparin (Lovenox)  Patient Centered Rounds: I performed bedside rounds with nursing staff today  Discussions with Specialists or Other Care Team Provider: case management    Education and Discussions with Family / Patient: Patient declined call to   Time Spent for Care: 40  More than 50% of total time spent on counseling and coordination of care as described above  Current Length of Stay: 2 day(s)  Current Patient Status: Inpatient   Certification Statement: The patient will continue to require additional inpatient hospital stay due to IV dexamethasone  Discharge Plan: Anticipate discharge in 24-48 hrs to home  Code Status: Level 1 - Full Code    Subjective:   Patient reports he feels better today but does not understand why he is receiving insulin --> had a lengthy discussion about this with him and he verbalized that he understood much better  He states he still wishes to have IV fluids on board because he feels much better with them   Also reports that his cough is causing him some pain and wishes to have medcation to help him with this, he was not aware that his Robitussin was PRN so I made it scheduled for him so he does not have to ask for it    Objective:     Vitals:   Temp (24hrs), Av 1 °F (36 7 °C), Min:97 6 °F (36 4 °C), Max:98 4 °F (36 9 °C)    Temp:  [97 6 °F (36 4 °C)-98 4 °F (36 9 °C)] 98 3 °F (36 8 °C)  HR:  [75-87] 85  Resp:  [16-22] 22  BP: (133-144)/(80-86) 139/85  SpO2:  [90 %-96 %] 93 %  Body mass index is 28 73 kg/m²  Input and Output Summary (last 24 hours): Intake/Output Summary (Last 24 hours) at 2021 1543  Last data filed at 2021 1300  Gross per 24 hour   Intake 2806 67 ml   Output 1250 ml   Net 1556 67 ml       Physical Exam:   Physical Exam  Vitals reviewed  Constitutional:       General: He is not in acute distress  Appearance: Normal appearance  He is not ill-appearing  HENT:      Head: Normocephalic and atraumatic  Cardiovascular:      Rate and Rhythm: Normal rate and regular rhythm  Pulses: Normal pulses  Heart sounds: Normal heart sounds  Pulmonary:      Effort: Pulmonary effort is normal       Breath sounds: Rhonchi present  No wheezing or rales  Abdominal:      General: Bowel sounds are normal       Palpations: Abdomen is soft  Tenderness: There is no abdominal tenderness  There is no guarding or rebound  Musculoskeletal:         General: Normal range of motion  Cervical back: Normal range of motion and neck supple  Right lower leg: No edema  Left lower leg: No edema  Skin:     General: Skin is warm and dry  Neurological:      General: No focal deficit present  Mental Status: He is alert and oriented to person, place, and time     Psychiatric:         Mood and Affect: Mood normal          Behavior: Behavior normal           Additional Data:     Labs:  Results from last 7 days   Lab Units 21  0508 21  0508   WBC Thousand/uL 5 62 3 89*   HEMOGLOBIN g/dL 11 7* 11 9*   HEMATOCRIT % 35 3* 35 6*   PLATELETS Thousands/uL 334 289 BANDS PCT %  --  4   NEUTROS PCT % 80*  --    LYMPHS PCT % 14  --    LYMPHO PCT %  --  12*   MONOS PCT % 5  --    MONO PCT %  --  4   EOS PCT % 0 0     Results from last 7 days   Lab Units 09/02/21  0508   SODIUM mmol/L 134*   POTASSIUM mmol/L 4 1   CHLORIDE mmol/L 103   CO2 mmol/L 28   BUN mg/dL 20   CREATININE mg/dL 0 79   ANION GAP mmol/L 3*   CALCIUM mg/dL 8 2*   ALBUMIN g/dL 2 1*   TOTAL BILIRUBIN mg/dL 0 48   ALK PHOS U/L 124*   ALT U/L 325*   AST U/L 160*   GLUCOSE RANDOM mg/dL 241*         Results from last 7 days   Lab Units 09/02/21  1106 09/01/21  2115 09/01/21  1619 09/01/21  1116 09/01/21  0734 08/31/21  2124 08/31/21  1617   POC GLUCOSE mg/dl 329* 308* 306* 345* 197* 211* 214*               Lines/Drains:  Invasive Devices     Peripheral Intravenous Line            Peripheral IV 08/31/21 Left Antecubital 2 days    Peripheral IV 09/02/21 Proximal;Right;Ventral (anterior) Forearm <1 day                      Imaging: No pertinent imaging reviewed      Recent Cultures (last 7 days):         Last 24 Hours Medication List:   Current Facility-Administered Medications   Medication Dose Route Frequency Provider Last Rate    al mag oxide-diphenhydramine-lidocaine viscous  10 mL Swish & Swallow Q6H PRN Naida Smith PA-C      amLODIPine  5 mg Oral Daily Domenico Thurston MD      aspirin  81 mg Oral Daily Domenico Thurston MD      atorvastatin  40 mg Oral After Camilla Herrera MD      cholecalciferol  2,000 Units Oral Daily Domenico Thurston MD      dexamethasone  6 mg Intravenous Q24H Domenico Thurston MD      dextromethorphan-guaiFENesin  10 mL Oral [facility] Naida Smith PA-C      enoxaparin  1 mg/kg Subcutaneous Q12H [address] Domenico Thurston MD      ibuprofen  400 mg Oral Q6H PRN Naida Smith PA-C      insulin glargine  17 Units Subcutaneous HS Naida Smith PA-C      insulin lispro  1-6 Units Subcutaneous TID AC Domenico Thurston MD      losartan  50 mg Oral Daily Domenico Thurston MD  multi-electrolyte  100 mL/hr Intravenous Continuous Shila Howell PA-C 100 mL/hr (09/02/21 1528)    pantoprazole  40 mg Oral Early Morning Shila Howell PA-C      remdesivir  100 mg Intravenous Q24H Kylee Campos MD          Today, Patient Was Seen By: Shila Howell PA-C    **Please Note: This note may have been constructed using a voice recognition system  **

## 2021-09-03 PROBLEM — E11.65 TYPE 2 DIABETES MELLITUS WITH HYPERGLYCEMIA, WITHOUT LONG-TERM CURRENT USE OF INSULIN (HCC): Status: ACTIVE | Noted: 2021-08-31

## 2021-09-03 PROBLEM — J96.01 ACUTE HYPOXEMIC RESPIRATORY FAILURE DUE TO COVID-19 (HCC): Status: ACTIVE | Noted: 2021-08-25

## 2021-09-03 LAB
ALBUMIN SERPL BCP-MCNC: 2.2 G/DL (ref 3.5–5)
ALP SERPL-CCNC: 137 U/L (ref 46–116)
ALT SERPL W P-5'-P-CCNC: 340 U/L (ref 12–78)
ANION GAP SERPL CALCULATED.3IONS-SCNC: 3 MMOL/L (ref 4–13)
AST SERPL W P-5'-P-CCNC: 173 U/L (ref 5–45)
BASOPHILS # BLD AUTO: 0.01 THOUSANDS/ΜL (ref 0–0.1)
BASOPHILS NFR BLD AUTO: 0 % (ref 0–1)
BILIRUB SERPL-MCNC: 0.45 MG/DL (ref 0.2–1)
BUN SERPL-MCNC: 17 MG/DL (ref 5–25)
CALCIUM ALBUM COR SERPL-MCNC: 9.9 MG/DL (ref 8.3–10.1)
CALCIUM SERPL-MCNC: 8.5 MG/DL (ref 8.3–10.1)
CHLORIDE SERPL-SCNC: 105 MMOL/L (ref 100–108)
CO2 SERPL-SCNC: 28 MMOL/L (ref 21–32)
CREAT SERPL-MCNC: 0.74 MG/DL (ref 0.6–1.3)
CRP SERPL QL: 71.2 MG/L
EOSINOPHIL # BLD AUTO: 0 THOUSAND/ΜL (ref 0–0.61)
EOSINOPHIL NFR BLD AUTO: 0 % (ref 0–6)
ERYTHROCYTE [DISTWIDTH] IN BLOOD BY AUTOMATED COUNT: 12.8 % (ref 11.6–15.1)
GFR SERPL CREATININE-BSD FRML MDRD: 118 ML/MIN/1.73SQ M
GLUCOSE SERPL-MCNC: 221 MG/DL (ref 65–140)
GLUCOSE SERPL-MCNC: 225 MG/DL (ref 65–140)
GLUCOSE SERPL-MCNC: 243 MG/DL (ref 65–140)
GLUCOSE SERPL-MCNC: 317 MG/DL (ref 65–140)
GLUCOSE SERPL-MCNC: 339 MG/DL (ref 65–140)
HCT VFR BLD AUTO: 36.5 % (ref 36.5–49.3)
HGB BLD-MCNC: 12.2 G/DL (ref 12–17)
IMM GRANULOCYTES # BLD AUTO: 0.15 THOUSAND/UL (ref 0–0.2)
IMM GRANULOCYTES NFR BLD AUTO: 2 % (ref 0–2)
LYMPHOCYTES # BLD AUTO: 0.81 THOUSANDS/ΜL (ref 0.6–4.47)
LYMPHOCYTES NFR BLD AUTO: 11 % (ref 14–44)
MCH RBC QN AUTO: 27.9 PG (ref 26.8–34.3)
MCHC RBC AUTO-ENTMCNC: 33.4 G/DL (ref 31.4–37.4)
MCV RBC AUTO: 84 FL (ref 82–98)
MONOCYTES # BLD AUTO: 0.38 THOUSAND/ΜL (ref 0.17–1.22)
MONOCYTES NFR BLD AUTO: 5 % (ref 4–12)
NEUTROPHILS # BLD AUTO: 6.06 THOUSANDS/ΜL (ref 1.85–7.62)
NEUTS SEG NFR BLD AUTO: 82 % (ref 43–75)
NRBC BLD AUTO-RTO: 0 /100 WBCS
PLATELET # BLD AUTO: 397 THOUSANDS/UL (ref 149–390)
PMV BLD AUTO: 9.6 FL (ref 8.9–12.7)
POTASSIUM SERPL-SCNC: 4.3 MMOL/L (ref 3.5–5.3)
PROT SERPL-MCNC: 6.7 G/DL (ref 6.4–8.2)
RBC # BLD AUTO: 4.37 MILLION/UL (ref 3.88–5.62)
SODIUM SERPL-SCNC: 136 MMOL/L (ref 136–145)
WBC # BLD AUTO: 7.41 THOUSAND/UL (ref 4.31–10.16)

## 2021-09-03 PROCEDURE — 99232 SBSQ HOSP IP/OBS MODERATE 35: CPT | Performed by: PHYSICIAN ASSISTANT

## 2021-09-03 PROCEDURE — 85025 COMPLETE CBC W/AUTO DIFF WBC: CPT | Performed by: PHYSICIAN ASSISTANT

## 2021-09-03 PROCEDURE — 86140 C-REACTIVE PROTEIN: CPT | Performed by: PHYSICIAN ASSISTANT

## 2021-09-03 PROCEDURE — 82948 REAGENT STRIP/BLOOD GLUCOSE: CPT

## 2021-09-03 PROCEDURE — 80053 COMPREHEN METABOLIC PANEL: CPT | Performed by: PHYSICIAN ASSISTANT

## 2021-09-03 RX ORDER — INSULIN GLARGINE 100 [IU]/ML
24 INJECTION, SOLUTION SUBCUTANEOUS
Status: DISCONTINUED | OUTPATIENT
Start: 2021-09-03 | End: 2021-09-04 | Stop reason: HOSPADM

## 2021-09-03 RX ADMIN — Medication 2000 UNITS: at 08:53

## 2021-09-03 RX ADMIN — ASPIRIN 81 MG: 81 TABLET, COATED ORAL at 08:53

## 2021-09-03 RX ADMIN — INSULIN LISPRO 5 UNITS: 100 INJECTION, SOLUTION INTRAVENOUS; SUBCUTANEOUS at 11:13

## 2021-09-03 RX ADMIN — ATORVASTATIN CALCIUM 40 MG: 40 TABLET, FILM COATED ORAL at 17:22

## 2021-09-03 RX ADMIN — INSULIN LISPRO 6 UNITS: 100 INJECTION, SOLUTION INTRAVENOUS; SUBCUTANEOUS at 14:02

## 2021-09-03 RX ADMIN — LOSARTAN POTASSIUM 50 MG: 50 TABLET, FILM COATED ORAL at 08:52

## 2021-09-03 RX ADMIN — INSULIN LISPRO 6 UNITS: 100 INJECTION, SOLUTION INTRAVENOUS; SUBCUTANEOUS at 17:25

## 2021-09-03 RX ADMIN — AMLODIPINE BESYLATE 5 MG: 5 TABLET ORAL at 08:52

## 2021-09-03 RX ADMIN — SODIUM CHLORIDE, SODIUM GLUCONATE, SODIUM ACETATE, POTASSIUM CHLORIDE, MAGNESIUM CHLORIDE, SODIUM PHOSPHATE, DIBASIC, AND POTASSIUM PHOSPHATE 100 ML/HR: .53; .5; .37; .037; .03; .012; .00082 INJECTION, SOLUTION INTRAVENOUS at 02:13

## 2021-09-03 RX ADMIN — PANTOPRAZOLE SODIUM 40 MG: 40 TABLET, DELAYED RELEASE ORAL at 05:16

## 2021-09-03 RX ADMIN — INSULIN LISPRO 2 UNITS: 100 INJECTION, SOLUTION INTRAVENOUS; SUBCUTANEOUS at 08:54

## 2021-09-03 RX ADMIN — GUAIFENESIN AND DEXTROMETHORPHAN 10 ML: 100; 10 SYRUP ORAL at 05:16

## 2021-09-03 RX ADMIN — DEXAMETHASONE SODIUM PHOSPHATE 6 MG: 4 INJECTION INTRA-ARTICULAR; INTRALESIONAL; INTRAMUSCULAR; INTRAVENOUS; SOFT TISSUE at 17:23

## 2021-09-03 RX ADMIN — INSULIN LISPRO 2 UNITS: 100 INJECTION, SOLUTION INTRAVENOUS; SUBCUTANEOUS at 23:58

## 2021-09-03 RX ADMIN — REMDESIVIR 100 MG: 100 INJECTION, POWDER, LYOPHILIZED, FOR SOLUTION INTRAVENOUS at 17:40

## 2021-09-03 RX ADMIN — GUAIFENESIN AND DEXTROMETHORPHAN 10 ML: 100; 10 SYRUP ORAL at 14:02

## 2021-09-03 RX ADMIN — GUAIFENESIN AND DEXTROMETHORPHAN 10 ML: 100; 10 SYRUP ORAL at 23:56

## 2021-09-03 RX ADMIN — ENOXAPARIN SODIUM 80 MG: 80 INJECTION SUBCUTANEOUS at 17:22

## 2021-09-03 RX ADMIN — INSULIN GLARGINE 24 UNITS: 100 INJECTION, SOLUTION SUBCUTANEOUS at 23:57

## 2021-09-03 RX ADMIN — INSULIN LISPRO 5 UNITS: 100 INJECTION, SOLUTION INTRAVENOUS; SUBCUTANEOUS at 17:25

## 2021-09-03 RX ADMIN — ENOXAPARIN SODIUM 80 MG: 80 INJECTION SUBCUTANEOUS at 05:16

## 2021-09-03 NOTE — ASSESSMENT & PLAN NOTE
· Sodium 130 on admission  · Status post IV fluid resuscitation  · No indication for continued IV fluid this time given tolerating regular diet  · Repeat labs tomorrow    Lab Results   Component Value Date    SODIUM 136 09/03/2021    SODIUM 134 (L) 09/02/2021    SODIUM 135 (L) 09/01/2021    SODIUM 130 (L) 08/31/2021

## 2021-09-03 NOTE — ASSESSMENT & PLAN NOTE
· Initially diagnosed COVID-19 on 08/21/2021  · Admitted to [facility] 8/25 and left [facility] 8/26 -at that time was not requiring oxygen  · Patient returned to [facility] 8/31 due to increased shortness of breath and productive cough  · Chest x-ray with worsening bilateral pneumonia  · Patient had shortness of breath episode this morning but was not hypoxic  Was on 2 L and was increased to 3 L at that time  At the time of my evaluation he was weaned off oxygen however then became hypoxic in the mid 80s and therefore was placed back on 1 L nasal cannula    · Continue IV dexamethasone and remdesivir (day #4)  · Therapeutic anticoagulation with Lovenox  · Recommend patient get out of bed and ambulate  · Discontinue IV fluids given risk of volume overload in the setting of COVID-19 infection  · Encourage incentive spirometry

## 2021-09-03 NOTE — ASSESSMENT & PLAN NOTE
Lab Results   Component Value Date    HGBA1C 7 3 (H) 08/26/2021       Recent Labs     09/02/21  1701 09/02/21  2114 09/03/21  0805 09/03/21  1104   POCGLU 356* 308* 221* 317*       Blood Sugar Average: Last 72 hrs:  (P) 282 [id number]     · Patient is not maintained on any medication at home  · Using approximately 30 units of insulin per day  · Continue Lantus however increased to 24 units q h s    · Schedule mealtime insulin at 6 units TID with meals  · Add SSI QHS (in addition to TID with meals)  · On discharge will use metformin - given steroids will be discontinued at that time will likely not require insulin

## 2021-09-03 NOTE — PROGRESS NOTES
[address] [facility]  Progress Note - Francia Armond 1965, 61 y o  male MRN: [id number]  Unit/Bed#: [facility] 809-01 Encounter: [id number]  Primary Care Provider: No primary care provider on file  Date and time admitted to hospital: 8/31/2021 10:58 AM    * Acute hypoxemic respiratory failure due to COVID-19 [facility])  Assessment & Plan  · Initially diagnosed COVID-19 on 08/21/2021  · Admitted to [facility] 8/25 and left AMA 8/26 -at that time was not requiring oxygen  · Patient returned to [facility] 8/31 due to increased shortness of breath and productive cough  · Chest x-ray with worsening bilateral pneumonia  · Patient had shortness of breath episode this morning but was not hypoxic  Was on 2 L and was increased to 3 L at that time  At the time of my evaluation he was weaned off oxygen however then became hypoxic in the mid 80s and therefore was placed back on 1 L nasal cannula    · Continue IV dexamethasone and remdesivir (day #4)  · Therapeutic anticoagulation with Lovenox  · Recommend patient get out of bed and ambulate  · Discontinue IV fluids given risk of volume overload in the setting of COVID-19 infection  · Encourage incentive spirometry    Transaminitis  Assessment & Plan  Lab Results   Component Value Date     (H) 09/03/2021     (H) 09/02/2021     (H) 09/01/2021     (H) 09/03/2021     (H) 09/02/2021     (H) 09/01/2021     · Chronic hepatitis panel negative  · Liver ultrasound ordered and currently pending at this time  · Suspect that this is related to COVID-19 infection  · No indication to discontinue remdesivir at this time given only mildly elevated  · Avoid hepatotoxins  · Repeat CMP in the morning    Hyponatremia  Assessment & Plan  · Sodium 130 on admission  · Status post IV fluid resuscitation  · No indication for continued IV fluid this time given tolerating regular diet  · Repeat labs tomorrow    Lab Results   Component Value Date    SODIUM 136 09/03/2021    SODIUM 134 (L) 09/02/2021    SODIUM 135 (L) 09/01/2021    SODIUM 130 (L) 08/31/2021       Type 2 diabetes mellitus with hyperglycemia, without long-term current use of insulin [facility])  Assessment & Plan  Lab Results   Component Value Date    HGBA1C 7 3 (H) 08/26/2021       Recent Labs     09/02/21  1701 09/02/21  2114 09/03/21  0805 09/03/21  1104   POCGLU 356* 308* 221* 317*       Blood Sugar Average: Last 72 hrs:  (P) 282 [id number]     · Patient is not maintained on any medication at home  · Using approximately 30 units of insulin per day  · Continue Lantus however increased to 24 units q h s  · Schedule mealtime insulin at 6 units TID with meals  · Add SSI QHS (in addition to TID with meals)  · On discharge will use metformin - given steroids will be discontinued at that time will likely not require insulin    Hypertension  Assessment & Plan  · Continue amlodipine and losartan    Hypercholesterolemia  Assessment & Plan  · Continue statin      VTE Pharmacologic Prophylaxis: VTE Score: 3 Moderate Risk (Score 3-4) - Pharmacological DVT Prophylaxis Ordered: enoxaparin (Lovenox)  Patient Centered Rounds: I performed bedside rounds with nursing staff today  Discussions with Specialists or Other Care Team Provider: nursing    Education and Discussions with Family / Patient: Patient declined call to   Time Spent for Care: 45 minutes  More than 50% of total time spent on counseling and coordination of care as described above  Current Length of Stay: 3 day(s)  Current Patient Status: Inpatient   Certification Statement: The patient will continue to require additional inpatient hospital stay due to IV steroids, oxygen needs  Discharge Plan: Anticipate discharge in 24-48 hrs to home  patient was requesting home services  At this time there is no indication or need for home care      Code Status: Level 1 - Full Code    Subjective:   Patient reports that he wants the IV fluids to keep going because his body it too dry  Discussed that this was not indicated  Wants to have more oxygen turned on to bring into his body  Objective:     Vitals:   Temp (24hrs), Av 2 °F (36 8 °C), Min:97 8 °F (36 6 °C), Max:98 4 °F (36 9 °C)    Temp:  [97 8 °F (36 6 °C)-98 4 °F (36 9 °C)] 97 8 °F (36 6 °C)  HR:  [] 103  Resp:  [18-22] 18  BP: (139-164)/(85-98) 154/98  SpO2:  [92 %-98 %] 92 %  Body mass index is 28 73 kg/m²  Input and Output Summary (last 24 hours): Intake/Output Summary (Last 24 hours) at 9/3/2021 1351  Last data filed at 9/3/2021 0529  Gross per 24 hour   Intake 2088 34 ml   Output 3050 ml   Net -961 66 ml       Physical Exam:   Physical Exam  Vitals and nursing note reviewed  Constitutional:       General: He is not in acute distress  Appearance: Normal appearance  He is not ill-appearing or diaphoretic  HENT:      Head: Normocephalic and atraumatic  Mouth/Throat:      Mouth: Mucous membranes are moist    Cardiovascular:      Rate and Rhythm: Normal rate and regular rhythm  Heart sounds: No murmur heard  Pulmonary:      Effort: Pulmonary effort is normal       Breath sounds: Normal breath sounds  No stridor  No wheezing, rhonchi or rales  Abdominal:      General: Bowel sounds are normal       Palpations: Abdomen is soft  There is no mass  Tenderness: There is no abdominal tenderness  There is no guarding  Musculoskeletal:      Right lower leg: No edema  Left lower leg: No edema  Skin:     General: Skin is warm and dry  Neurological:      Mental Status: He is alert     Psychiatric:         Mood and Affect: Mood normal           Additional Data:     Labs:  Results from last 7 days   Lab Units 21  0526 21  0508   WBC Thousand/uL 7 41 3 89*   HEMOGLOBIN g/dL 12 2 11 9*   HEMATOCRIT % 36 5 35 6*   PLATELETS Thousands/uL 397* 289   BANDS PCT %  --  4   NEUTROS PCT % 82*  --    LYMPHS PCT % 11*  --    LYMPHO PCT % --  12*   MONOS PCT % 5  --    MONO PCT %  --  4   EOS PCT % 0 0     Results from last 7 days   Lab Units 09/03/21  0526   SODIUM mmol/L 136   POTASSIUM mmol/L 4 3   CHLORIDE mmol/L 105   CO2 mmol/L 28   BUN mg/dL 17   CREATININE mg/dL 0 74   ANION GAP mmol/L 3*   CALCIUM mg/dL 8 5   ALBUMIN g/dL 2 2*   TOTAL BILIRUBIN mg/dL 0 45   ALK PHOS U/L 137*   ALT U/L 340*   AST U/L 173*   GLUCOSE RANDOM mg/dL 225*         Results from last 7 days   Lab Units 09/03/21  1104 09/03/21  0805 09/02/21  2114 09/02/21  1701 09/02/21  1106 09/01/21  2115 09/01/21  1619 09/01/21  1116 09/01/21  0734 08/31/21  2124 08/31/21  1617   POC GLUCOSE mg/dl 317* 221* 308* 356* 329* 308* 306* 345* 197* 211* 214*               Lines/Drains:  Invasive Devices     Peripheral Intravenous Line            Peripheral IV 09/02/21 Proximal;Right;Ventral (anterior) Forearm <1 day                      Imaging: Reviewed radiology reports from this admission including: chest xray    Recent Cultures (last 7 days):         Last 24 Hours Medication List:   Current Facility-Administered Medications   Medication Dose Route Frequency Provider Last Rate    al mag oxide-diphenhydramine-lidocaine viscous  10 mL Swish & Swallow Q6H PRN Kristina Wills PA-C      amLODIPine  5 mg Oral Daily Jose Bernal MD      aspirin  81 mg Oral Daily Jose Bernal MD      atorvastatin  40 mg Oral After Sujata Escobar MD      cholecalciferol  2,000 Units Oral Daily Jose Bernal MD      dexamethasone  6 mg Intravenous Q24H Jose Bernal MD      dextromethorphan-guaiFENesin  10 mL Oral [facility] Kristina Wills PA-C      enoxaparin  1 mg/kg Subcutaneous Q12H [facility] Jose Bernal MD      ibuprofen  400 mg Oral Q6H PRN Kristina Wills PA-C      insulin glargine  24 Units Subcutaneous HS Krista Jacinto PA-C      insulin lispro  1-5 Units Subcutaneous HS Krista Jacinto PA-C      insulin lispro  1-6 Units Subcutaneous TID LEELEE Groves PA-C      insulin lispro  6 Units Subcutaneous TID With Meals Krista Jacinto PA-C      losartan  50 mg Oral Daily Maureen Harding MD      pantoprazole  40 mg Oral Early Morning Ariel Rutledge PA-C      remdesivir  100 mg Intravenous Q24H Maureen Harding MD          Today, Patient Was Seen By: Benito Nicole PA-C    **Please Note: This note may have been constructed using a voice recognition system  **

## 2021-09-03 NOTE — PLAN OF CARE
Problem: Potential for Falls  Goal: Patient will remain free of falls  Description: INTERVENTIONS:  - Educate patient/family on patient safety including physical limitations  - Instruct patient to call for assistance with activity   - Consult OT/PT to assist with strengthening/mobility   - Keep Call bell within reach  - Keep bed low and locked with side rails adjusted as appropriate  - Keep care items and personal belongings within reach  - Initiate and maintain comfort rounds  - Make Fall Risk Sign visible to staff  - Apply yellow socks and bracelet for high fall risk patients  - Consider moving patient to room near nurses station  Outcome: Progressing     Problem: PAIN - ADULT  Goal: Verbalizes/displays adequate comfort level or baseline comfort level  Description: Interventions:  - Encourage patient to monitor pain and request assistance  - Assess pain using appropriate pain scale  - Administer analgesics based on type and severity of pain and evaluate response  - Implement non-pharmacological measures as appropriate and evaluate response  - Consider cultural and social influences on pain and pain management  - Notify physician/advanced practitioner if interventions unsuccessful or patient reports new pain  Outcome: Progressing     Problem: INFECTION - ADULT  Goal: Absence or prevention of progression during hospitalization  Description: INTERVENTIONS:  - Assess and monitor for signs and symptoms of infection  - Monitor lab/diagnostic results  - Monitor all insertion sites, i e  indwelling lines, tubes, and drains  - Monitor endotracheal if appropriate and nasal secretions for changes in amount and color  - [place] appropriate cooling/warming therapies per order  - Administer medications as ordered  - Instruct and encourage patient and family to use good hand hygiene technique  - Identify and instruct in appropriate isolation precautions for identified infection/condition  Outcome: Progressing  Goal: Absence of fever/infection during neutropenic period  Description: INTERVENTIONS:  - Monitor WBC    Outcome: Progressing     Problem: SAFETY ADULT  Goal: Patient will remain free of falls  Description: INTERVENTIONS:  - Educate patient/family on patient safety including physical limitations  - Instruct patient to call for assistance with activity   - Consult OT/PT to assist with strengthening/mobility   - Keep Call bell within reach  - Keep bed low and locked with side rails adjusted as appropriate  - Keep care items and personal belongings within reach  - Initiate and maintain comfort rounds  - Apply yellow socks and bracelet for high fall risk patients  - Consider moving patient to room near nurses station  Outcome: Progressing  Goal: Maintain or return to baseline ADL function  Description: INTERVENTIONS:  -  Assess patient's ability to carry out ADLs; assess patient's baseline for ADL function and identify physical deficits which impact ability to perform ADLs (bathing, care of mouth/teeth, toileting, grooming, dressing, etc )  - Assess/evaluate cause of self-care deficits   - Assess range of motion  - Assess patient's mobility; develop plan if impaired  - Assess patient's need for assistive devices and provide as appropriate  - Encourage maximum independence but intervene and supervise when necessary  - Involve family in performance of ADLs  - Assess for home care needs following discharge   - Consider OT consult to assist with ADL evaluation and planning for discharge  - Provide patient education as appropriate  Outcome: Progressing  Goal: Maintains/Returns to pre admission functional level  Description: INTERVENTIONS:  - Perform BMAT or MOVE assessment daily    - Set and communicate daily mobility goal to care team and patient/family/caregiver     - Collaborate with rehabilitation services on mobility goals if consulted  - Record patient progress and toleration of activity level   Outcome: Progressing     Problem: DISCHARGE PLANNING  Goal: Discharge to home or other facility with appropriate resources  Description: INTERVENTIONS:  - Identify barriers to discharge w/patient and caregiver  - Arrange for needed discharge resources and transportation as appropriate  - Identify discharge learning needs (meds, wound care, etc )  - Arrange for interpretive services to assist at discharge as needed  - Refer to Case Management Department for coordinating discharge planning if the patient needs post-hospital services based on physician/advanced practitioner order or complex needs related to functional status, cognitive ability, or social support system  Outcome: Progressing     Problem: Knowledge Deficit  Goal: Patient/family/caregiver demonstrates understanding of disease process, treatment plan, medications, and discharge instructions  Description: Complete learning assessment and assess knowledge base    Interventions:  - Provide teaching at level of understanding  - Provide teaching via preferred learning methods  Outcome: Progressing     Problem: RESPIRATORY - ADULT  Goal: Achieves optimal ventilation and oxygenation  Description: INTERVENTIONS:  - Assess for changes in respiratory status  - Assess for changes in mentation and behavior  - Position to facilitate oxygenation and minimize respiratory effort  - Oxygen administered by appropriate delivery if ordered  - Initiate smoking cessation education as indicated  - Encourage broncho-pulmonary hygiene including cough, deep breathe, Incentive Spirometry  - Assess the need for suctioning and aspirate as needed  - Assess and instruct to report SOB or any respiratory difficulty  - Respiratory Therapy support as indicated  Outcome: Progressing

## 2021-09-03 NOTE — ASSESSMENT & PLAN NOTE
Lab Results   Component Value Date     (H) 09/03/2021     (H) 09/02/2021     (H) 09/01/2021     (H) 09/03/2021     (H) 09/02/2021     (H) 09/01/2021     · Chronic hepatitis panel negative  · Liver ultrasound ordered and currently pending at this time  · Suspect that this is related to COVID-19 infection  · No indication to discontinue remdesivir at this time given only mildly elevated  · Avoid hepatotoxins  · Repeat CMP in the morning

## 2021-09-03 NOTE — PLAN OF CARE
Problem: Potential for Falls  Goal: Patient will remain free of falls  Description: INTERVENTIONS:  - Educate patient/family on patient safety including physical limitations  - Instruct patient to call for assistance with activity   - Consult OT/PT to assist with strengthening/mobility   - Keep Call bell within reach  - Keep bed low and locked with side rails adjusted as appropriate  - Keep care items and personal belongings within reach  - Initiate and maintain comfort rounds  - Make Fall Risk Sign visible to staff  -   - Apply yellow socks and bracelet for high fall risk patients  - Consider moving patient to room near nurses station  Outcome: Progressing     Problem: PAIN - ADULT  Goal: Verbalizes/displays adequate comfort level or baseline comfort level  Description: Interventions:  - Encourage patient to monitor pain and request assistance  - Assess pain using appropriate pain scale  - Administer analgesics based on type and severity of pain and evaluate response  - Implement non-pharmacological measures as appropriate and evaluate response  - Consider cultural and social influences on pain and pain management  - Notify physician/advanced practitioner if interventions unsuccessful or patient reports new pain  Outcome: Progressing     Problem: INFECTION - ADULT  Goal: Absence or prevention of progression during hospitalization  Description: INTERVENTIONS:  - Assess and monitor for signs and symptoms of infection  - Monitor lab/diagnostic results  - Monitor all insertion sites, i e  indwelling lines, tubes, and drains  - Monitor endotracheal if appropriate and nasal secretions for changes in amount and color  - [place] appropriate cooling/warming therapies per order  - Administer medications as ordered  - Instruct and encourage patient and family to use good hand hygiene technique  - Identify and instruct in appropriate isolation precautions for identified infection/condition  Outcome: Progressing  Goal: Absence of fever/infection during neutropenic period  Description: INTERVENTIONS:  - Monitor WBC    Outcome: Progressing     Problem: SAFETY ADULT  Goal: Patient will remain free of falls  Description: INTERVENTIONS:  - Educate patient/family on patient safety including physical limitations  - Instruct patient to call for assistance with activity   - Consult OT/PT to assist with strengthening/mobility   - Keep Call bell within reach  - Keep bed low and locked with side rails adjusted as appropriate  - Keep care items and personal belongings within reach  - Initiate and maintain comfort rounds  - Make Fall Risk Sign visible to staff    - Apply yellow socks and bracelet for high fall risk patients  - Consider moving patient to room near nurses station  Outcome: Progressing  Goal: Maintain or return to baseline ADL function  Description: INTERVENTIONS:  -  Assess patient's ability to carry out ADLs; assess patient's baseline for ADL function and identify physical deficits which impact ability to perform ADLs (bathing, care of mouth/teeth, toileting, grooming, dressing, etc )  - Assess/evaluate cause of self-care deficits   - Assess range of motion  - Assess patient's mobility; develop plan if impaired  - Assess patient's need for assistive devices and provide as appropriate  - Encourage maximum independence but intervene and supervise when necessary  - Involve family in performance of ADLs  - Assess for home care needs following discharge   - Consider OT consult to assist with ADL evaluation and planning for discharge  - Provide patient education as appropriate  Outcome: Progressing  Goal: Maintains/Returns to pre admission functional level  Description: INTERVENTIONS:  - Perform BMAT or MOVE assessment daily    - Set and communicate daily mobility goal to care team and patient/family/caregiver  - Collaborate with rehabilitation services on mobility goals if consulted  - Perform Range of Motion 3 times a day    - Reposition patient every 2 hours  - Dangle patient 3 times a day  - Stand patient 3 times a day  - Ambulate patient 3 times a day  - Out of bed to chair 3 times a day   - Out of bed for meals 3 times a day  - Out of bed for toileting  - Record patient progress and toleration of activity level   Outcome: Progressing     Problem: DISCHARGE PLANNING  Goal: Discharge to home or other facility with appropriate resources  Description: INTERVENTIONS:  - Identify barriers to discharge w/patient and caregiver  - Arrange for needed discharge resources and transportation as appropriate  - Identify discharge learning needs (meds, wound care, etc )  - Arrange for interpretive services to assist at discharge as needed  - Refer to Case Management Department for coordinating discharge planning if the patient needs post-hospital services based on physician/advanced practitioner order or complex needs related to functional status, cognitive ability, or social support system  Outcome: Progressing     Problem: Knowledge Deficit  Goal: Patient/family/caregiver demonstrates understanding of disease process, treatment plan, medications, and discharge instructions  Description: Complete learning assessment and assess knowledge base    Interventions:  - Provide teaching at level of understanding  - Provide teaching via preferred learning methods  Outcome: Progressing     Problem: RESPIRATORY - ADULT  Goal: Achieves optimal ventilation and oxygenation  Description: INTERVENTIONS:  - Assess for changes in respiratory status  - Assess for changes in mentation and behavior  - Position to facilitate oxygenation and minimize respiratory effort  - Oxygen administered by appropriate delivery if ordered  - Initiate smoking cessation education as indicated  - Encourage broncho-pulmonary hygiene including cough, deep breathe, Incentive Spirometry  - Assess the need for suctioning and aspirate as needed  - Assess and instruct to report SOB or any respiratory difficulty  - Respiratory Therapy support as indicated  Outcome: Progressing

## 2021-09-04 VITALS
RESPIRATION RATE: 16 BRPM | HEART RATE: 64 BPM | DIASTOLIC BLOOD PRESSURE: 69 MMHG | BODY MASS INDEX: 28.79 KG/M2 | TEMPERATURE: 97.6 F | OXYGEN SATURATION: 98 % | SYSTOLIC BLOOD PRESSURE: 135 MMHG | HEIGHT: 67 IN | WEIGHT: 183.42 LBS

## 2021-09-04 LAB
ALBUMIN SERPL BCP-MCNC: 2.3 G/DL (ref 3.5–5)
ALP SERPL-CCNC: 144 U/L (ref 46–116)
ALT SERPL W P-5'-P-CCNC: 413 U/L (ref 12–78)
ANION GAP SERPL CALCULATED.3IONS-SCNC: 2 MMOL/L (ref 4–13)
AST SERPL W P-5'-P-CCNC: 186 U/L (ref 5–45)
BASOPHILS # BLD AUTO: 0.02 THOUSANDS/ΜL (ref 0–0.1)
BASOPHILS NFR BLD AUTO: 0 % (ref 0–1)
BILIRUB SERPL-MCNC: 0.44 MG/DL (ref 0.2–1)
BUN SERPL-MCNC: 19 MG/DL (ref 5–25)
CALCIUM ALBUM COR SERPL-MCNC: 10.2 MG/DL (ref 8.3–10.1)
CALCIUM SERPL-MCNC: 8.8 MG/DL (ref 8.3–10.1)
CHLORIDE SERPL-SCNC: 102 MMOL/L (ref 100–108)
CO2 SERPL-SCNC: 31 MMOL/L (ref 21–32)
CREAT SERPL-MCNC: 0.88 MG/DL (ref 0.6–1.3)
EOSINOPHIL # BLD AUTO: 0 THOUSAND/ΜL (ref 0–0.61)
EOSINOPHIL NFR BLD AUTO: 0 % (ref 0–6)
ERYTHROCYTE [DISTWIDTH] IN BLOOD BY AUTOMATED COUNT: 13.2 % (ref 11.6–15.1)
GFR SERPL CREATININE-BSD FRML MDRD: 110 ML/MIN/1.73SQ M
GLUCOSE SERPL-MCNC: 185 MG/DL (ref 65–140)
GLUCOSE SERPL-MCNC: 196 MG/DL (ref 65–140)
GLUCOSE SERPL-MCNC: 255 MG/DL (ref 65–140)
HCT VFR BLD AUTO: 38.3 % (ref 36.5–49.3)
HGB BLD-MCNC: 12.6 G/DL (ref 12–17)
IMM GRANULOCYTES # BLD AUTO: 0.16 THOUSAND/UL (ref 0–0.2)
IMM GRANULOCYTES NFR BLD AUTO: 2 % (ref 0–2)
LYMPHOCYTES # BLD AUTO: 1.04 THOUSANDS/ΜL (ref 0.6–4.47)
LYMPHOCYTES NFR BLD AUTO: 14 % (ref 14–44)
MCH RBC QN AUTO: 27.8 PG (ref 26.8–34.3)
MCHC RBC AUTO-ENTMCNC: 32.9 G/DL (ref 31.4–37.4)
MCV RBC AUTO: 84 FL (ref 82–98)
MONOCYTES # BLD AUTO: 0.49 THOUSAND/ΜL (ref 0.17–1.22)
MONOCYTES NFR BLD AUTO: 7 % (ref 4–12)
NEUTROPHILS # BLD AUTO: 5.68 THOUSANDS/ΜL (ref 1.85–7.62)
NEUTS SEG NFR BLD AUTO: 77 % (ref 43–75)
NRBC BLD AUTO-RTO: 0 /100 WBCS
PLATELET # BLD AUTO: 410 THOUSANDS/UL (ref 149–390)
PMV BLD AUTO: 9.9 FL (ref 8.9–12.7)
POTASSIUM SERPL-SCNC: 4.4 MMOL/L (ref 3.5–5.3)
PROT SERPL-MCNC: 6.7 G/DL (ref 6.4–8.2)
RBC # BLD AUTO: 4.54 MILLION/UL (ref 3.88–5.62)
SODIUM SERPL-SCNC: 135 MMOL/L (ref 136–145)
WBC # BLD AUTO: 7.39 THOUSAND/UL (ref 4.31–10.16)

## 2021-09-04 PROCEDURE — 99239 HOSP IP/OBS DSCHRG MGMT >30: CPT | Performed by: INTERNAL MEDICINE

## 2021-09-04 PROCEDURE — 94761 N-INVAS EAR/PLS OXIMETRY MLT: CPT

## 2021-09-04 PROCEDURE — 80053 COMPREHEN METABOLIC PANEL: CPT | Performed by: PHYSICIAN ASSISTANT

## 2021-09-04 PROCEDURE — 85025 COMPLETE CBC W/AUTO DIFF WBC: CPT | Performed by: PHYSICIAN ASSISTANT

## 2021-09-04 PROCEDURE — 82948 REAGENT STRIP/BLOOD GLUCOSE: CPT

## 2021-09-04 RX ORDER — PREDNISONE 10 MG/1
TABLET ORAL
Qty: 18 TABLET | Refills: 0 | Status: SHIPPED | OUTPATIENT
Start: 2021-09-04 | End: 2021-09-13

## 2021-09-04 RX ORDER — GUAIFENESIN/DEXTROMETHORPHAN 100-10MG/5
10 SYRUP ORAL EVERY 8 HOURS SCHEDULED
Qty: 210 ML | Refills: 0 | Status: SHIPPED | OUTPATIENT
Start: 2021-09-04 | End: 2021-09-11

## 2021-09-04 RX ORDER — ALBUTEROL SULFATE 90 UG/1
2 AEROSOL, METERED RESPIRATORY (INHALATION) EVERY 6 HOURS PRN
Qty: 6.7 G | Refills: 0 | Status: SHIPPED | OUTPATIENT
Start: 2021-09-04 | End: 2021-09-14

## 2021-09-04 RX ADMIN — PANTOPRAZOLE SODIUM 40 MG: 40 TABLET, DELAYED RELEASE ORAL at 05:12

## 2021-09-04 RX ADMIN — INSULIN LISPRO 1 UNITS: 100 INJECTION, SOLUTION INTRAVENOUS; SUBCUTANEOUS at 07:56

## 2021-09-04 RX ADMIN — GUAIFENESIN AND DEXTROMETHORPHAN 10 ML: 100; 10 SYRUP ORAL at 05:12

## 2021-09-04 RX ADMIN — INSULIN LISPRO 6 UNITS: 100 INJECTION, SOLUTION INTRAVENOUS; SUBCUTANEOUS at 12:07

## 2021-09-04 RX ADMIN — INSULIN LISPRO 6 UNITS: 100 INJECTION, SOLUTION INTRAVENOUS; SUBCUTANEOUS at 07:57

## 2021-09-04 RX ADMIN — ENOXAPARIN SODIUM 80 MG: 80 INJECTION SUBCUTANEOUS at 05:12

## 2021-09-04 RX ADMIN — ASPIRIN 81 MG: 81 TABLET, COATED ORAL at 08:00

## 2021-09-04 RX ADMIN — AMLODIPINE BESYLATE 5 MG: 5 TABLET ORAL at 08:00

## 2021-09-04 RX ADMIN — INSULIN LISPRO 3 UNITS: 100 INJECTION, SOLUTION INTRAVENOUS; SUBCUTANEOUS at 12:14

## 2021-09-04 RX ADMIN — LOSARTAN POTASSIUM 50 MG: 50 TABLET, FILM COATED ORAL at 08:00

## 2021-09-04 RX ADMIN — Medication 2000 UNITS: at 08:00

## 2021-09-04 NOTE — DISCHARGE INSTR - AVS FIRST PAGE
Please follow-up with your primary care physician for repeat liver function blood work, check ultrasound outpatient

## 2021-09-04 NOTE — ASSESSMENT & PLAN NOTE
· Initially diagnosed COVID-19 on 08/21/2021  · Admitted to [facility] 8/25 and left AMA 8/26 -at that time was not requiring oxygen  · Patient returned to [facility] 8/31 due to increased shortness of breath and productive cough  · Chest x-ray with worsening bilateral pneumonia  · finshed 4 days of IV dexamethasone and remdesivir   · Current room air  Patient did not need any oxygen on home oxygen evaluation

## 2021-09-04 NOTE — DISCHARGE SUMMARY
[facility]  Discharge- Alisha Viera 1965, 61 y o  male MRN: [id number]  [facility] Encounter: [id number]  Primary Care Provider: No primary care provider on file  Date and time admitted to hospital: 8/31/2021 10:58 AM    * Acute hypoxemic respiratory failure due to COVID-19 [facility])  Assessment & Plan  · Initially diagnosed COVID-19 on 08/21/2021  · Admitted to [address] [place] [address] 8/25 and left AMA 8/26 -at that time was not requiring oxygen  · Patient returned to [facility] 8/31 due to increased shortness of breath and productive cough  · Chest x-ray with worsening bilateral pneumonia  · finshed 4 days of IV dexamethasone and remdesivir   · Current room air  Patient did not need any oxygen on home oxygen evaluation  Transaminitis  Assessment & Plan  Lab Results   Component Value Date     (H) 09/04/2021     (H) 09/03/2021     (H) 09/02/2021     (H) 09/04/2021     (H) 09/03/2021     (H) 09/02/2021     · Chronic hepatitis panel negative  · Due to COVID infection  · CT of the chest shows upper abdomen, possible hepatic steatosis  · Outpatient follow-up with PCP, GI for repeat LFT, ultrasound outpatient  · Patient denies any nausea, vomiting, abdominal pain, change in bowel habits  Hypercholesterolemia  Assessment & Plan  · Continue statin    Hypertension  Assessment & Plan  · Continue amlodipine and losartan      Admitting Provider:  Domenico Thurston MD  Discharge Provider:  Yuki Clark MD  Admission Date: 8/31/2021       Discharge Date: 09/04/21   LOS: 4  Primary Care Physician at Discharge: No primary care provider on file  None    HOSPITAL COURSE:  Alisha Viera is a 61 y o  male who presented shortness of breath and cough  Initially was admitted to [facility] on 08/25/2021 for the same  Patient left AMA the following day  Patient presented back to the hospital with worsening shortness of breath    Required 2 L of oxygen for saturation of 87% on presentation on room air  Patient started on steroid, remdesivir, was put on Lovenox for anticoagulation  Patient respond very well to treatment  Currently on room air  Maintaining a saturation upon exertion  Does not require oxygen at home  Patient has transaminitis during hospital stay  Patient denies any GI symptoms  Most likely related to COVID-19 infection  Patient liver function is preserved  Agreed to follow-up outpatient for repeat labs, outpatient ultrasound  Medical in clinically stable for discharge    Please see problem list listed below  REASON FOR ADMISSION/ ADMISSION DIAGNOSES    DISCHARGE DIAGNOSES  * Acute hypoxemic respiratory failure due to COVID-19 [facility])  Assessment & Plan  · Initially diagnosed COVID-19 on 08/21/2021  · Admitted to [facility] 8/25 and left [place] 8/26 -at that time was not requiring oxygen  · Patient returned to \[facility]\"" 8/31 due to increased shortness of breath and productive cough  · Chest x-ray with worsening bilateral pneumonia  · finshed 4 days of IV dexamethasone and remdesivir   · Current room air  Patient did not need any oxygen on home oxygen evaluation  Transaminitis  Assessment & Plan  Lab Results   Component Value Date     (H) 09/04/2021     (H) 09/03/2021     (H) 09/02/2021     (H) 09/04/2021     (H) 09/03/2021     (H) 09/02/2021     · Chronic hepatitis panel negative  · Due to COVID infection  · CT of the chest shows upper abdomen, possible hepatic steatosis  · Outpatient follow-up with PCP, GI for repeat LFT, ultrasound outpatient  · Patient denies any nausea, vomiting, abdominal pain, change in bowel habits      Hypercholesterolemia  Assessment & Plan  · Continue statin    Hypertension  Assessment & Plan  · Continue amlodipine and losartan      CONSULTING PROVIDERS   None    PROCEDURES PERFORMED  * No surgery found *    RADIOLOGY RESULTS  XR chest 1 view portable    Result Date: 8/31/2021  Impression: Multifocal patchy airspace disease bilaterally worsened since the prior examination and consistent with the clinical history   Workstation performed: [id number]       LABS  Results from last 7 days   Lab Units 09/04/21  0458 09/03/21  0526 09/02/21  0508 09/01/21  0508 08/31/21  1147   WBC Thousand/uL 7 39 7 41 5 62 3 89* 6 66   HEMOGLOBIN g/dL 12 6 12 2 11 7* 11 9* 12 4   HEMATOCRIT % 38 3 36 5 35 3* 35 6* 37 3   MCV fL 84 84 86 88 86   TOTAL NEUT ABS Thousand/uL  --   --   --  3 27 5 66   BANDS PCT %  --   --   --  4  --    PLATELETS Thousands/uL 410* 397* 334 289 296     Results from last 7 days   Lab Units 09/04/21 0458 09/03/21  0526 09/02/21  0508 09/01/21  0508 08/31/21  1147   SODIUM mmol/L 135* 136 134* 135* 130*   POTASSIUM mmol/L 4 4 4 3 4 1 4 5 4 0   CHLORIDE mmol/L 102 105 103 104 98*   CO2 mmol/L 31 28 28 27 28   BUN mg/dL 19 17 20 18 15   CREATININE mg/dL 0 88 0 74 0 79 0 74 0 89   CALCIUM mg/dL 8 8 8 5 8 2* 8 1* 8 3   ALBUMIN g/dL 2 3* 2 2* 2 1* 2 1*  --    TOTAL BILIRUBIN mg/dL 0 44 0 45 0 48 0 80  --    ALK PHOS U/L 144* 137* 124* 138*  --    ALT U/L 413* 340* 325* 344*  --    AST U/L 186* 173* 160* 219*  --    EGFR ml/min/1 73sq m 110 118 115 118 110   GLUCOSE RANDOM mg/dL 196* 225* 241* 204* 207*     Results from last 7 days   Lab Units 08/31/21  1406 08/31/21  1147   CK TOTAL U/L  --  142   TROPONIN I ng/mL <0 02  --      Results from last 7 days   Lab Units 08/31/21  1147   NT-PRO BNP pg/mL 61          Results from last 7 days   Lab Units 09/04/21  1209 09/04/21  0751 09/03/21  2307 09/03/21  1636 09/03/21  1104 09/03/21  0805 09/02/21  2114 09/02/21  1701 09/02/21  1106 09/01/21 2115   POC GLUCOSE mg/dl 255* 185* 243* 339* 317* 221* 308* 356* 329* 308*                       Cultures:         Invalid input(s): URIBILINOGEN              PHYSICAL EXAM:  Vitals:   Blood Pressure: 149/90 (09/03/21 2309)  Pulse: 64 (09/03/21 2309)  Temperature: 98 1 °F (36 7 °C) (09/03/21 2309)  Temp Source: Oral (09/03/21 2309)  Respirations: 18 (09/03/21 2309)  Height: 5' 7" (170 2 cm) (08/31/21 1641)  Weight - Scale: 83 2 kg (183 lb 6 8 oz) (08/31/21 1641)  SpO2: 98 % (09/04/21 1238)    General appearance: alert, appears stated age and cooperative  HEENT - atraumatic and normocephalic  Neck- supple  Skin - no fresh rash  Extremities no fresh focal deformities  Cardiovascular- S1-S2 heard  Respiratory- bilateral air entry present, no crackles or rhonchi  Skin - no fresh rash  Abdomen - normal bowel sounds present, no rebound tenderness  CNS- No fresh focal deficits  Psych- no acute psychosis     Planned Re-admission: No  Discharge Disposition: Left against medical advice or discontinued care  Facility: Home Selfcare    Test Results Pending at Discharge:   Incidental findings: Hepatic Steatosis     Medications   · Summary of Medication Adjustments made as a result of this hospitalization:  Start prednisone taper regimen, albuterol inhaler as needed  · Medication Dosing Tapers - Please refer to Discharge Medication List for details on any medication dosing tapers (if applicable to patient)  · Discharge Medication List: See after visit summary for reconciled discharge medications  Diet restrictions: Diabetic        Diet Orders   (From admission, onward)             Start     Ordered    08/31/21 1335  Diet Konrad/CHO Controlled; Consistent Carbohydrate Diet Level 2 (5 carb servings/75 grams CHO/meal)  Diet effective now     Question Answer Comment   Diet Type Konrad/CHO Controlled    Konrad/CHO Controlled Consistent Carbohydrate Diet Level 2 (5 carb servings/75 grams CHO/meal)    RD to adjust diet per protocol? Yes        08/31/21 1334              Activity restrictions: No strenuous activity  Discharge Condition: fair    Outpatient Follow-Up and Discharge Instructions  See after visit summary section titled Discharge Instructions for information provided to patient and family  Code Status: Level 1 - Full Code  Discharge Statement   I spent 35 minutes discharging the patient  This time was spent on the day of discharge  Greater than 50% of total time was spent with the patient and / or family counseling and / or coordination of care  Dea Ivan  Internal Medicine    ** Please Note: This note has been constructed using a voice recognition system   **

## 2021-09-04 NOTE — RESPIRATORY THERAPY NOTE
Home Oxygen Qualifying Test       Patient name: Carie Metz        : 1965   Date of Test:  2021  Diagnosis:      Home Oxygen Test:    **Medicare Guidelines require item(s) 1-5 on all ambulatory patients or 1 and 2 on non-ambulatory patients  1   Baseline SPO2 on Room Air at rest 100 %  2   SPO2 during exercise on Room Air 89 %  During exercise monitor SpO2  If SPO2 increases >=89% with ambulation do not add supplemental             oxygen  If <= 88% on room air add O2 via NC and titrate patient  Patient must be ambulated with O2 and titrated to > 88% with exertion  3   SPO2 on Oxygen at rest 98 % na lpm     4   SPO2 during exercise on Oxygen  na% a liter flow of na lpm     5   Exercise performed:          walking, duration 6 (min)          []  Supplemental Home Oxygen is indicated  [x]  Client does not qualify for home oxygen        Respiratory Additional Notes; pt walked in his room (covid +) for 6 minutes on room air, did very well, does not need 02 at home    RT Tevin

## 2021-09-04 NOTE — ASSESSMENT & PLAN NOTE
Lab Results   Component Value Date     (H) 09/04/2021     (H) 09/03/2021     (H) 09/02/2021     (H) 09/04/2021     (H) 09/03/2021     (H) 09/02/2021     · Chronic hepatitis panel negative  · Due to COVID infection  · CT of the chest shows upper abdomen, possible hepatic steatosis  · Outpatient follow-up with PCP, GI for repeat LFT, ultrasound outpatient  · Patient denies any nausea, vomiting, abdominal pain, change in bowel habits

## 2021-09-07 NOTE — UTILIZATION REVIEW
Notification of Discharge   This is a Notification of Discharge from our facility [address]  Please be advised that this patient has been discharge from our facility  Below you will find the admission and discharge date and time including the patients disposition  UTILIZATION REVIEW CONTACT:  Marcelina Hernandez  Utilization   Network Utilization Review Department  Phone: [phone] x carefully listen to the prompts  All voicemails are confidential   Email: [email or web address]     PHYSICIAN ADVISORY SERVICES:  FOR [id number] REVIEW - MEDICAL NECESSITY DENIAL  Phone: [phone]  Fax: [phone]  Email: [email or web address]     PRESENTATION DATE: 8/31/2021 10:58 AM  OBERVATION ADMISSION DATE:   INPATIENT ADMISSION DATE: 8/31/21 12:50 PM   DISCHARGE DATE: 9/4/2021  4:00 PM  DISPOSITION: Home/Self Care Home/Self Care      IMPORTANT INFORMATION:  Send all requests for admission clinical reviews, approved or denied determinations and any other requests to dedicated fax number below belonging to the campus where the patient is receiving treatment   List of dedicated fax numbers:  [address] DENIALS (Administrative/Medical Necessity) [phone]   [address] (Maternity/NICU/Pediatrics) [phone]   Debbora Glass [phone]   Markell Nicole [phone]   [address] [place] [address]  [phone]   [address] [phone]   [facility]  [phone]   [address] [place] [address], [facility]  [address] [facility] 1000 [facility] [phone]

## 2021-09-16 ENCOUNTER — HOSPITAL ENCOUNTER (OUTPATIENT)
Dept: CT IMAGING | Facility: HOSPITAL | Age: 56
Discharge: HOME | End: 2021-09-16
Attending: INTERNAL MEDICINE
Payer: COMMERCIAL

## 2021-09-16 ENCOUNTER — TELEPHONE (OUTPATIENT)
Dept: CARDIOLOGY CLINIC | Facility: CLINIC | Age: 56
End: 2021-09-16

## 2021-09-16 VITALS — SYSTOLIC BLOOD PRESSURE: 131 MMHG | RESPIRATION RATE: 20 BRPM | HEART RATE: 70 BPM | DIASTOLIC BLOOD PRESSURE: 75 MMHG

## 2021-09-16 DIAGNOSIS — R94.39 ABNORMAL NUCLEAR STRESS TEST: ICD-10-CM

## 2021-09-16 PROCEDURE — G1004 CDSM NDSC: HCPCS

## 2021-09-16 PROCEDURE — 75574 CT ANGIO HRT W/3D IMAGE: CPT

## 2021-09-16 RX ORDER — METOPROLOL TARTRATE 5 MG/5ML
5 INJECTION INTRAVENOUS
Status: DISCONTINUED | OUTPATIENT
Start: 2021-09-16 | End: 2021-09-17 | Stop reason: HOSPADM

## 2021-09-16 RX ORDER — NITROGLYCERIN 0.4 MG/1
0.4 TABLET SUBLINGUAL ONCE
Status: COMPLETED | OUTPATIENT
Start: 2021-09-16 | End: 2021-09-16

## 2021-09-16 RX ADMIN — NITROGLYCERIN 0.4 MG: 0.4 TABLET SUBLINGUAL at 14:40

## 2021-09-16 RX ADMIN — METOPROLOL TARTRATE 5 MG: 5 INJECTION INTRAVENOUS at 14:23

## 2021-09-16 RX ADMIN — METOPROLOL TARTRATE 5 MG: 5 INJECTION INTRAVENOUS at 14:28

## 2021-09-16 RX ADMIN — IODIXANOL 100 ML: 320 INJECTION, SOLUTION INTRAVASCULAR at 15:02

## 2021-09-16 RX ADMIN — METOPROLOL TARTRATE 5 MG: 5 INJECTION INTRAVENOUS at 14:36

## 2021-09-16 RX ADMIN — METOPROLOL TARTRATE 5 MG: 5 INJECTION INTRAVENOUS at 14:17

## 2021-09-16 RX ADMIN — METOPROLOL TARTRATE 5 MG: 5 INJECTION INTRAVENOUS at 14:12

## 2021-09-16 NOTE — NURSING NOTE
Pt arrived for cardiac cta, test/medications reviewed  Pt states he to Metoprolol as directed  Denies PDE5 inhibitor use  IV metoprolol given 5 mg x 5 doses to improve HR closer to target for test  See vitals flowsheet  Tolerated test well, IV extravasation occurred during IV Visipaque infusion  After test flushing and timing bolus of 20 ml infused without difficulty, 80 ml contast injected and approximately 30 ml extravasated  IV d/c, arm elevated and ice applied  Enough contrast infused, test did not need to be repeated  Dr Whittaker Reap notified and evaluated patient RUE  No surgical consult required  See extravasation assessment form [id number]  Treatment protocol form reviewed with patient for treatment instructions and worsening symptoms to notify radiology team  Copy given with phone numbers included  Patient and wife verbalize understanding  Encouraged pt to increase fluids remainder of day

## 2021-09-16 NOTE — LETTER
[address] [place] [address]  [address] [facility] [address]      September 21, 2021    MRN: [id number]     Phone: [phone]     Dear Mr Wilmar Randhawa recently had a(n) Cat Scan performed on 9/16/2021 at  [address] [place] [address] that was requested by Ino Mahoney MD  The study was reviewed by a radiologist, which is a physician who specializes in medical imaging  The radiologist issued a report describing his or her findings  In that report there was a finding that the radiologist felt warranted further discussion with your health care provider and that discussion would be beneficial to you  The results were sent to Ino Mahoney MD on 09/16/2021  3:40 PM  We recommend that you contact Ino Mahoney MD at [phone] or set up an appointment to discuss the results of the imaging test  If you have already heard from Ino Mahoney MD regarding the results of your study, you can disregard this letter  This letter is not meant to alarm you, but intended to encourage you to follow-up on your results with the provider that sent you for the imaging study  In addition, we have enclosed answers to frequently asked questions by other patients who have also received a letter to review results with their health care provider (see page two)  Thank you for choosing [address] [place] [address] for your medical imaging needs  FREQUENTLY ASKED QUESTIONS    1  Why am I receiving this letter? [facility] requires us to notify patients who have findings on imaging exams that may require more testing or follow-up with a health professional within the next 3 months  2  How serious is the finding on the imaging test?  This letter is sent to all patients who may need follow-up or more testing within the next 3 months  Receiving this letter does not necessarily mean you have a life-threatening imaging finding or disease  Recommendations in the radiologists imaging report are general in nature and it is up to your healthcare provider to say whether those recommendations make sense for your situation  You are strongly encouraged to talk to your health care provider about the results and ask whether additional steps need to be taken  3  Where can I get a copy of the final report for my recent radiology exam?  To get a full copy of the report you can access your records online at [email or web address] or please contact [address] [place] [address] Department at [phone] Monday through Friday between 8 am and 6 pm          4  What do I need to do now? Please contact your health care provider who requested the imaging study to discuss what further actions (if any) are needed  You may have already heard from (your ordering provider) in regard to this test in which case you can disregard this letter  NOTICE IN ACCORDANCE WITH THE [facility] PATIENT TEST RESULT INFORMATION ACT OF 2018    You are receiving this notice as a result of a determination by your diagnostic imaging service that further discussions of your test results are warranted and would be beneficial to you  The complete results of your test or tests have been or will be sent to the health care practitioner that ordered the test or tests  It is recommended that you contact your health care practitioner to discuss your results as soon as possible

## 2021-09-17 DIAGNOSIS — R07.2 PRECORDIAL PAIN: ICD-10-CM

## 2021-09-17 DIAGNOSIS — R94.39 ABNORMAL NUCLEAR STRESS TEST: ICD-10-CM

## 2021-09-17 DIAGNOSIS — R93.89 ABNORMAL COMPUTED TOMOGRAPHY ANGIOGRAPHY (CTA): Primary | ICD-10-CM

## 2021-09-17 NOTE — PROGRESS NOTES
Patient had prior abnormal stress test  Chest burning after 7 minutes of exercise with ischemic EKG and inferior artifact appearance  He refused cardiac catheterization, we proceeded with CTA with the understanding that it may still produce results requiring a cardiac catheterization  He had Visipaque extravasation, somewhat suboptimal study, but radiological interpretation confirms high likelihood of a 60% left main lesion  He was just hospitalized with COVID 2 weeks ago  COVID testing was positive at the end of August  At this point he is off of isolation  But still feels he is too weak to proceed with cardiac catheterization at this time, have sent a message to the surgical coordinator team to arrange for a cardiac catheterization soonest 1st week of October as he has no unstable anginal symptoms at this time  He did have elevated LFTs, reports PCP did follow-up blood work which we do not have available, will obtain this    Will still need blood work immediately preceding the cardiac catheterization, and this was ordered

## 2021-09-17 NOTE — NURSING NOTE
Spoke with patient to follow up with RAC extravasation of approx  30 ml Visipaque occurring yesterday during cardiac cta  Pt states he has no pain, movement to arm/hand is normal  States swelling is improved, "little bit,not much " He verifies IV site and surrounding skin remains intact, no blistering or discolor  Call back number given for any concerns

## 2021-09-22 ENCOUNTER — TELEPHONE (OUTPATIENT)
Dept: CARDIOLOGY CLINIC | Facility: CLINIC | Age: 56
End: 2021-09-22

## 2021-09-22 NOTE — TELEPHONE ENCOUNTER
Patient scheduled for [facility] on 10/13/21 in [facility] with Dr Kristy Durant  Mailing patient instructions  Can I have auth?

## 2021-09-23 NOTE — TELEPHONE ENCOUNTER
He does not need auth for his Cath [id number] or [id number] on 10/13/21 at [facility] per Clyde PITTMAN at [facility]  9/23/21

## 2021-09-27 ENCOUNTER — OFFICE VISIT (OUTPATIENT)
Dept: CARDIOLOGY CLINIC | Facility: CLINIC | Age: 56
End: 2021-09-27
Payer: COMMERCIAL

## 2021-09-27 VITALS
BODY MASS INDEX: 26.61 KG/M2 | WEIGHT: 169.9 LBS | HEART RATE: 74 BPM | SYSTOLIC BLOOD PRESSURE: 122 MMHG | DIASTOLIC BLOOD PRESSURE: 72 MMHG

## 2021-09-27 DIAGNOSIS — J96.01 ACUTE HYPOXEMIC RESPIRATORY FAILURE DUE TO COVID-19 (HCC): ICD-10-CM

## 2021-09-27 DIAGNOSIS — E78.00 HYPERCHOLESTEROLEMIA: Primary | ICD-10-CM

## 2021-09-27 DIAGNOSIS — U07.1 ACUTE HYPOXEMIC RESPIRATORY FAILURE DUE TO COVID-19 (HCC): ICD-10-CM

## 2021-09-27 DIAGNOSIS — E11.65 TYPE 2 DIABETES MELLITUS WITH HYPERGLYCEMIA, WITHOUT LONG-TERM CURRENT USE OF INSULIN (HCC): ICD-10-CM

## 2021-09-27 DIAGNOSIS — I10 ESSENTIAL HYPERTENSION: ICD-10-CM

## 2021-09-27 PROCEDURE — 99215 OFFICE O/P EST HI 40 MIN: CPT | Performed by: INTERNAL MEDICINE

## 2021-09-27 RX ORDER — PEN NEEDLE, DIABETIC 31 GX5/16"
NEEDLE, DISPOSABLE MISCELLANEOUS
COMMUNITY
Start: 2021-09-15

## 2021-09-27 RX ORDER — DIPHENHYD/PE/ACETAMINOPHEN/GG 25-5-325MG
TABLET, SEQUENTIAL ORAL
COMMUNITY
Start: 2021-09-15

## 2021-09-27 RX ORDER — INSULIN GLARGINE 100 [IU]/ML
INJECTION, SOLUTION SUBCUTANEOUS
COMMUNITY
Start: 2021-09-15

## 2021-09-27 NOTE — PROGRESS NOTES
[facility]  Office  Progress note  Arturo Gupta 61 y o  male MRN: [id number]        Problems    1  Hypercholesterolemia     2  Acute hypoxemic respiratory failure due to COVID-19 ([address] )     3  Essential hypertension     4  Type 2 diabetes mellitus with hyperglycemia, without long-term current use of insulin ([facility])         Impression:       Chest pain  o  he continues to have somewhat atypical symptoms  o  EKG previously showed LVH with repolarization abnormality  o  troponin during his hospital visit was normal in April   Hypercholesterolemia  o  ASCVD risk is high at 24%, statin therapy was indicated in started  o Remains poorly controlled with an LDL of 149    abnormal stress test  o  followed by 60% left main on CTA, although visit peak was used, and extravasation resulted in suboptimal study    abnormal LFTs   o  recently as high as 500, with follow-up blood work just a week and half ago, LFTs resolved    type 2 diabetes new  o  recent outpatient blood glucose 563 per results obtained from PCPs office  o  A1c 9 9   Hypertension  o  severely uncontrolled when I 1st saw him a few months back   o Added HCTZ due to JVD on examination   o Unfortunately rising creatinine prompted discontinuation and switch to amlodipine, he is otherwise on losartan 50 mg daily, metoprolol tartrate 50 mg twice a day  o  blood pressure is now well controlled  Plan    Gordon Hess and I had a long discussion today about the nature of the testing results so far, including the abnormal stress test and the abnormal yet suboptimal CTA of the coronaries   There is certainly a concerning 60% lesion of the left main, which Radiology felt was pretty likely despite the suboptimal study   I have discussed within the need for current medical therapy, and he has a list of medications to take home and insure he is on the right medications       He still a bit ambivalent about proceeding with cardiac catheterization, and possibly going to seek a 2nd opinion, we discussed all the risks of proceeding with cardiac catheterization, the possible outcomes including stenting versus bypass surgery   We will schedule a 2 month follow-up to further discuss once he has had time to think about it      I have spent 40 minutes with Patient and family today in which greater than 50% of this time was spent in counseling/coordination of care regarding Diagnostic results, Prognosis, Risks and benefits of tx options, Intructions for management, Patient and family education, Importance of tx compliance, Risk factor reductions and Impressions  HPI: Francia Leahy is a 61y o  year old male from [place] initially, , 2 sons, 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,  With a history of hypertension came to see me for symptoms of severe hypertension and chest pain with EKG of LVH and repolarization abnormality which occurred 4/21, and was hospitalized for that with normal troponins  He underwent a stress test which was abnormal   In communication with him he did not elect to proceed with cardiac catheterization, and we elected to proceed with cardiac CTA  60% left main was felt likely, although suboptimal injection due to visit take infiltration in the arm  HCTZ had been added in the interim, but he developed acute kidney injury, this was discontinued, and amlodipine started, and in conjunction with losartan and metoprolol as blood pressure is currently well controlled  Cholesterol is not well controlled, LDL recently 149  He is unsure about his current medications he is actually taking  He is supposed to be on baby aspirin as well  After the CTA we discussed cardiac catheterization yet again, however he just had COVID 19 pneumonia, with severe LFT elevation, severe hyperglycemia, A1c discovered to be 9 9, and he wished to wait, not 9 reasonable request considering his relatively stable symptoms    Most recent labs taken from his PCPs office, show a normal creatinine of 0 79, a glucose of 563, A1c 9 9, and resolved transaminitis  He is in here with his wife today, he is still not agreeable to proceed with cardiac catheterization, thinks he is going to get a 2nd opinion, acknowledges that he symptoms have not changed, still somewhat atypical chest pressure with and without exertion  Review of Systems   Constitutional: Negative for appetite change, diaphoresis, fatigue and fever  Respiratory: Negative for chest tightness, shortness of breath and wheezing  Cardiovascular: Positive for chest pain  Negative for palpitations and leg swelling  Gastrointestinal: Negative for abdominal pain and blood in stool  Musculoskeletal: Negative for arthralgias and joint swelling  Skin: Negative for rash  Neurological: Negative for dizziness, syncope and light-headedness  Past Medical History:   Diagnosis Date    Chest pain     Diverticulitis     GERD (gastroesophageal reflux disease)     HTN (hypertension)     Pain in left wrist      No past surgical history on file    Social History     Substance and Sexual Activity   Alcohol Use Yes    Comment: socially     Social History     Substance and Sexual Activity   Drug Use Never     Social History     Tobacco Use   Smoking Status Never Smoker   Smokeless Tobacco Never Used     Family History   Problem Relation Age of Onset    No Known Problems Mother     No Known Problems Father     No Known Problems Brother        Allergies:  No Known Allergies    Medications:     Current Outpatient Medications:     Alcohol Swabs ([employer] Alcohol Prep Pads) 70 % PADS, USE ONE EVERY DAY, Disp: , Rfl:     amLODIPine (NORVASC) 5 mg tablet, Take 1 tablet (5 mg total) by mouth daily, Disp: 30 tablet, Rfl: 3    Ascorbic Acid (vitamin C) 1000 MG tablet, Take 1 tablet (1,000 mg total) by mouth 2 (two) times a day, Disp: 60 tablet, Rfl: 0    aspirin (ECOTRIN LOW STRENGTH) 81 mg EC tablet, Take 1 tablet (81 mg total) by mouth daily, Disp: , Rfl:     atorvastatin (LIPITOR) 40 mg tablet, Take 1 tablet (40 mg total) by mouth daily with dinner, Disp: 30 tablet, Rfl: 3    B-D UF III MINI PEN NEEDLES 31G X 5 MM MISC, USE ONE EVERY DAY, Disp: , Rfl:     cholecalciferol (VITAMIN D3) 1,000 units tablet, Take 2 tablets (2,000 Units total) by mouth daily, Disp: 60 tablet, Rfl: 0    Lantus SoloStar 100 units/mL injection pen, INJECT 10 UNIT UNDER THE SKIN ONCE A DAY, Disp: , Rfl:     losartan (COZAAR) 50 mg tablet, Take 1 tablet (50 mg total) by mouth daily, Disp: 30 tablet, Rfl: 3    metFORMIN (GLUCOPHAGE) 500 mg tablet, Take 500 mg by mouth 2 (two) times a day, Disp: , Rfl:     metoprolol tartrate (LOPRESSOR) 50 mg tablet, Take 1 tablet the night before your cardiac CT, take 1 tablet the morning of your cardiac CT, and take the remainder of the tablets with you to the test, Disp: 4 tablet, Rfl: 0    pantoprazole (PROTONIX) 40 mg tablet, Take 1 tablet (40 mg total) by mouth daily in the early morning, Disp: 30 tablet, Rfl: 0      There were no vitals filed for this visit  Weight (last 2 days)     None        Physical Exam  Constitutional:       General: He is not in acute distress  Appearance: He is not diaphoretic  HENT:      Head: Normocephalic and atraumatic  Eyes:      General: No scleral icterus  Conjunctiva/sclera: Conjunctivae normal    Neck:      Vascular: No JVD  Cardiovascular:      Rate and Rhythm: Normal rate and regular rhythm  Heart sounds: Normal heart sounds  No murmur heard  Pulmonary:      Effort: Pulmonary effort is normal  No respiratory distress  Breath sounds: Normal breath sounds  No decreased breath sounds, wheezing, rhonchi or rales  Musculoskeletal:      Cervical back: Normal range of motion  Right lower leg: Normal  No edema  Left lower leg: Normal  No edema  Skin:     General: Skin is warm and dry     Neurological:      Mental Status: He is alert and oriented to person, place, and time  Laboratory Studies: All laboratory studies personally reviewed    Cardiac testing:     EKG reviewed personally:   No results found for this visit on 09/27/21 '      Echocardiogram:      Stress test:    7/21- moderate sized, fixed inferior wall defect, no reversible defect seen, had 1 5 mm of diffuse downsloping ST depression on EKG  Holter:      Cardiac catheterization:        Janine Robison MD    Portions of the record may have been created with voice recognition software  Occasional wrong word or "sound a like" substitutions may have occurred due to the inherent limitations of voice recognition software  Read the chart carefully and recognize, using context, where substitutions have occurred

## 2021-10-18 DIAGNOSIS — I10 ESSENTIAL HYPERTENSION: Primary | ICD-10-CM

## 2021-10-18 NOTE — TELEPHONE ENCOUNTER
Left voice message on patients phone and spoke to wife this morning in regards to blood work that was supposed to be done by 10/13/21  I did inform wife if labs were not completed by today, 10/18/21 then we will need to reschedule  Called both patient and wife at 3pm leaving a detailed message requesting a call back to reschedule procedure since labs are still not completed  Will await call back

## 2021-12-31 ENCOUNTER — HOSPITAL ENCOUNTER (EMERGENCY)
Facility: HOSPITAL | Age: 56
Discharge: HOME | End: 2021-12-31
Attending: EMERGENCY MEDICINE
Payer: COMMERCIAL

## 2021-12-31 VITALS
OXYGEN SATURATION: 100 % | BODY MASS INDEX: 27.14 KG/M2 | DIASTOLIC BLOOD PRESSURE: 99 MMHG | WEIGHT: 173.28 LBS | RESPIRATION RATE: 16 BRPM | TEMPERATURE: 98.6 F | HEART RATE: 96 BPM | SYSTOLIC BLOOD PRESSURE: 165 MMHG

## 2021-12-31 DIAGNOSIS — J02.9 PHARYNGITIS, UNSPECIFIED ETIOLOGY: Primary | ICD-10-CM

## 2021-12-31 LAB
FLUAV RNA RESP QL NAA+PROBE: NEGATIVE
FLUBV RNA RESP QL NAA+PROBE: NEGATIVE
RSV RNA RESP QL NAA+PROBE: NEGATIVE
S PYO DNA THROAT QL NAA+PROBE: NORMAL
SARS-COV-2 RNA RESP QL NAA+PROBE: NEGATIVE

## 2021-12-31 PROCEDURE — 99284 EMERGENCY DEPT VISIT MOD MDM: CPT | Performed by: PHYSICIAN ASSISTANT

## 2021-12-31 PROCEDURE — 0241U HB NFCT DS VIR RESP RNA 4 TRGT: CPT | Performed by: PHYSICIAN ASSISTANT

## 2021-12-31 PROCEDURE — 87651 STREP A DNA AMP PROBE: CPT | Performed by: PHYSICIAN ASSISTANT

## 2021-12-31 PROCEDURE — 99283 EMERGENCY DEPT VISIT LOW MDM: CPT

## 2021-12-31 RX ORDER — IBUPROFEN 600 MG/1
600 TABLET ORAL EVERY 6 HOURS PRN
Qty: 20 TABLET | Refills: 0 | Status: SHIPPED | OUTPATIENT
Start: 2021-12-31

## 2021-12-31 RX ORDER — PREDNISONE 20 MG/1
60 TABLET ORAL ONCE
Status: COMPLETED | OUTPATIENT
Start: 2021-12-31 | End: 2021-12-31

## 2021-12-31 RX ORDER — AMOXICILLIN 500 MG/1
500 CAPSULE ORAL 3 TIMES DAILY
Qty: 21 CAPSULE | Refills: 0 | Status: SHIPPED | OUTPATIENT
Start: 2021-12-31 | End: 2022-01-07

## 2021-12-31 RX ORDER — IBUPROFEN 600 MG/1
600 TABLET ORAL ONCE
Status: COMPLETED | OUTPATIENT
Start: 2021-12-31 | End: 2021-12-31

## 2021-12-31 RX ADMIN — PREDNISONE 60 MG: 20 TABLET ORAL at 11:35

## 2021-12-31 RX ADMIN — IBUPROFEN 600 MG: 600 TABLET, FILM COATED ORAL at 11:34

## 2021-12-31 NOTE — ED PROVIDER NOTES
nHistory  Chief Complaint   Patient presents with    Sore Throat     states he feels "like something is living in my throat"  lots of phlem  sore throat  red eyes     Pt with Past Medical History: Diverticulitis, GERD, HTN,   No PSH  Presents to ED c/o sore throat, painful swallowing, feels "like there is something in my throat", increased phlegm, left eye red, myalgias, feels tired  NO cp, no sob, no abd pain, no NVD          Prior to Admission Medications   Prescriptions Last Dose Informant Patient Reported? Taking?    Alcohol Swabs ([employer] Alcohol Prep Pads) 70 % PADS  Self Yes No   Sig: USE ONE EVERY DAY   Ascorbic Acid (vitamin C) 1000 MG tablet  Self No No   Sig: Take 1 tablet (1,000 mg total) by mouth 2 (two) times a day   B-D UF III MINI PEN NEEDLES 31G X 5 MM MISC  Self Yes No   Sig: USE ONE EVERY DAY   Lantus SoloStar 100 units/mL injection pen  Self Yes No   Sig: INJECT 10 UNIT UNDER THE SKIN ONCE A DAY   amLODIPine (NORVASC) 5 mg tablet  Self No No   Sig: Take 1 tablet (5 mg total) by mouth daily   aspirin (ECOTRIN LOW STRENGTH) 81 mg EC tablet  Self No No   Sig: Take 1 tablet (81 mg total) by mouth daily   atorvastatin (LIPITOR) 40 mg tablet  Self No No   Sig: Take 1 tablet (40 mg total) by mouth daily with dinner   cholecalciferol (VITAMIN D3) 1,000 units tablet  Self No No   Sig: Take 2 tablets (2,000 Units total) by mouth daily   losartan (COZAAR) 50 mg tablet  Self No No   Sig: Take 1 tablet (50 mg total) by mouth daily   metFORMIN (GLUCOPHAGE) 500 mg tablet  Self Yes No   Sig: Take 500 mg by mouth 2 (two) times a day   metoprolol tartrate (LOPRESSOR) 50 mg tablet  Self No No   Sig: Take 1 tablet the night before your cardiac CT, take 1 tablet the morning of your cardiac CT, and take the remainder of the tablets with you to the test   pantoprazole (PROTONIX) 40 mg tablet  Self No No   Sig: Take 1 tablet (40 mg total) by mouth daily in the early morning      Facility-Administered Medications: None Past Medical History:   Diagnosis Date    Chest pain     Diverticulitis     GERD (gastroesophageal reflux disease)     HTN (hypertension)     Pain in left wrist        History reviewed  No pertinent surgical history  Family History   Problem Relation Age of Onset    No Known Problems Mother     No Known Problems Father     No Known Problems Brother      I have reviewed and agree with the history as documented  E-Cigarette/Vaping    E-Cigarette Use Never User      E-Cigarette/Vaping Substances    Nicotine No     THC No     CBD No     Flavoring No     Other No     Unknown No      Social History     Tobacco Use    Smoking status: Never Smoker    Smokeless tobacco: Never Used   Vaping Use    Vaping Use: Never used   Substance Use Topics    Alcohol use: Yes     Comment: socially    Drug use: Never       Review of Systems   Constitutional: Negative for chills and fever  HENT: Positive for sore throat and trouble swallowing  Negative for hearing loss  Eyes: Negative for visual disturbance  Respiratory: Positive for cough  Negative for shortness of breath  Cardiovascular: Negative for chest pain and leg swelling  Gastrointestinal: Negative for abdominal pain and vomiting  Genitourinary: Negative for dysuria and frequency  Musculoskeletal: Negative for arthralgias and myalgias  Skin: Negative for pallor  Neurological: Negative for dizziness and weakness  Psychiatric/Behavioral: Negative for behavioral problems  All other systems reviewed and are negative  Physical Exam  Physical Exam  Vitals and nursing note reviewed  Constitutional:       General: He is in acute distress (mild)  Appearance: He is well-developed  He is ill-appearing  HENT:      Head: Normocephalic and atraumatic  Right Ear: Tympanic membrane, ear canal and external ear normal       Left Ear: Tympanic membrane, ear canal and external ear normal       Nose: Rhinorrhea (clear) present  Mouth/Throat:      Mouth: Mucous membranes are moist       Pharynx: Uvula midline  Pharyngeal swelling and posterior oropharyngeal erythema present  No oropharyngeal exudate or uvula swelling  Tonsils: No tonsillar exudate  Eyes:      Conjunctiva/sclera:      Left eye: Left conjunctiva is injected (slight)  Cardiovascular:      Rate and Rhythm: Normal rate and regular rhythm  Pulmonary:      Effort: Pulmonary effort is normal  No respiratory distress  Breath sounds: Normal breath sounds  Abdominal:      General: Bowel sounds are normal       Tenderness: There is no abdominal tenderness  Musculoskeletal:         General: Normal range of motion  Cervical back: Normal range of motion  Skin:     General: Skin is warm and dry  Findings: No erythema  Neurological:      General: No focal deficit present  Mental Status: He is alert and oriented to person, place, and time  Motor: No weakness     Psychiatric:         Behavior: Behavior normal          Vital Signs  ED Triage Vitals [12/31/21 1059]   Temperature Pulse Respirations Blood Pressure SpO2   98 6 °F (37 °C) 96 16 165/99 100 %      Temp Source Heart Rate Source Patient Position - Orthostatic VS BP Location FiO2 (%)   Oral Monitor Sitting Left arm --      Pain Score       10 - Worst Possible Pain           Vitals:    12/31/21 1059   BP: 165/99   Pulse: 96   Patient Position - Orthostatic VS: Sitting         Visual Acuity      ED Medications  Medications   ibuprofen (MOTRIN) tablet 600 mg (600 mg Oral Given 12/31/21 1134)   predniSONE tablet 60 mg (60 mg Oral Given 12/31/21 1135)       Diagnostic Studies  Results Reviewed     Procedure Component Value Units Date/Time    Strep A PCR [[id number]]  (Normal) Collected: 12/31/21 1109    Lab Status: Final result Specimen: Throat Updated: 12/31/21 1158     STREP A PCR None Detected    COVID/FLU/RSV - 2 hour TAT [[id number]]  (Normal) Collected: 12/31/21 1109    Lab Status: Final result Specimen: Nares from Nose Updated: 12/31/21 1153     SARS-CoV-2 Negative     INFLUENZA A PCR Negative     INFLUENZA B PCR Negative     RSV PCR Negative    Narrative:      FOR PEDIATRIC PATIENTS - copy/paste COVID Guidelines URL to browser: [email or web address]  [email or web address]     This test has been authorized by FDA under an EUA (Emergency Use Assay) for use by authorized laboratories  Clinical caution and judgement should be used with the interpretation of these results with consideration of the clinical impression and other laboratory testing  Testing reported as "Positive" or "Negative" has been proven to be accurate according to standard laboratory validation requirements  All testing is performed with control materials showing appropriate reactivity at standard intervals  No orders to display              Procedures  Procedures         ED Course                                             MDM    Disposition  Final diagnoses:   Pharyngitis, unspecified etiology     Time reflects when diagnosis was documented in both MDM as applicable and the Disposition within this note     Time User Action Codes Description Comment    12/31/2021 12:58 PM Lila Alcantara Add [J02 9] Pharyngitis, unspecified etiology       ED Disposition     ED Disposition Condition Date/Time Comment    Discharge Stable Fri Dec 31, 2021 12:58 PM Dayami Gutierrez discharge to home/self care              Follow-up Information     Follow up With Specialties Details Why Contact Info    Your PCP              Discharge Medication List as of 12/31/2021 12:59 PM      START taking these medications    Details   amoxicillin (AMOXIL) 500 mg capsule Take 1 capsule (500 mg total) by mouth 3 (three) times a day for 7 days, Starting Fri 12/31/2021, Until Fri 1/7/2022, Normal      ibuprofen (MOTRIN) 600 mg tablet Take 1 tablet (600 mg total) by mouth every 6 (six) hours as needed for mild pain, Starting Fri 12/31/2021, Normal         CONTINUE these medications which have NOT CHANGED    Details   Alcohol Swabs ([employer] Alcohol Prep Pads) 70 % PADS USE ONE EVERY DAY, Historical Med      amLODIPine (NORVASC) 5 mg tablet Take 1 tablet (5 mg total) by mouth daily, Starting Mon 7/19/2021, Normal      Ascorbic Acid (vitamin C) 1000 MG tablet Take 1 tablet (1,000 mg total) by mouth 2 (two) times a day, Starting Mon 8/23/2021, Until Mon 9/27/2021, Normal      aspirin (ECOTRIN LOW STRENGTH) 81 mg EC tablet Take 1 tablet (81 mg total) by mouth daily, Starting Mon 7/19/2021, No Print      atorvastatin (LIPITOR) 40 mg tablet Take 1 tablet (40 mg total) by mouth daily with dinner, Starting Thu 5/27/2021, Until Mon 9/27/2021, Normal      B-D UF III MINI PEN NEEDLES 31G X 5 MM MISC USE ONE EVERY DAY, Historical Med      cholecalciferol (VITAMIN D3) 1,000 units tablet Take 2 tablets (2,000 Units total) by mouth daily, Starting Mon 8/23/2021, Until Mon 9/27/2021, Normal      Lantus SoloStar 100 units/mL injection pen INJECT 10 UNIT UNDER THE SKIN ONCE A DAY, Historical Med      losartan (COZAAR) 50 mg tablet Take 1 tablet (50 mg total) by mouth daily, Starting Thu 5/27/2021, Until Mon 9/27/2021, Normal      metFORMIN (GLUCOPHAGE) 500 mg tablet Take 500 mg by mouth 2 (two) times a day, Starting Wed 9/15/2021, Historical Med      metoprolol tartrate (LOPRESSOR) 50 mg tablet Take 1 tablet the night before your cardiac CT, take 1 tablet the morning of your cardiac CT, and take the remainder of the tablets with you to the test, Normal      pantoprazole (PROTONIX) 40 mg tablet Take 1 tablet (40 mg total) by mouth daily in the early morning, Starting Sun 4/18/2021, Until Mon 9/27/2021, Normal             No discharge procedures on file      PDMP Review       Value Time User    PDMP Reviewed  Yes 8/25/2021  1:44 AM Micky Sanchez MD          ED Provider  Electronically Signed by           Carolann Coffman PA-C  12/31/21 1944

## 2022-10-12 PROBLEM — A41.9 SEPSIS (HCC): Status: RESOLVED | Noted: 2021-08-25 | Resolved: 2022-10-12

## 2022-11-04 NOTE — ASSESSMENT & PLAN NOTE
Assessment  Patient had a fever of 101-102 2  This morning patient Temp at 99F without any Acetaminophen since 00:10 today  · Follow-up on blood cultures  Clothing

## 2022-12-20 ENCOUNTER — TELEPHONE (OUTPATIENT)
Dept: CARDIOLOGY CLINIC | Facility: CLINIC | Age: 57
End: 2022-12-20

## 2022-12-20 NOTE — TELEPHONE ENCOUNTER
----- Message from Manan Alvarado MD sent at 12/16/2022 11:49 AM EST -----  Regarding: Patient has not followed up with me  Can you please reach out to the patient and assess where he stands with a recommendation for cardiac catheterization as of September 2021, if he actually got a second opinion somewhere else or if he has in fact undergone work-up at another institution

## 2023-03-01 ENCOUNTER — APPOINTMENT (EMERGENCY)
Dept: RADIOLOGY | Facility: HOSPITAL | Age: 58
End: 2023-03-01

## 2023-03-01 ENCOUNTER — HOSPITAL ENCOUNTER (EMERGENCY)
Facility: HOSPITAL | Age: 58
Discharge: HOME | End: 2023-03-01
Attending: EMERGENCY MEDICINE

## 2023-03-01 ENCOUNTER — TELEPHONE (OUTPATIENT)
Dept: OBGYN CLINIC | Facility: HOSPITAL | Age: 58
End: 2023-03-01

## 2023-03-01 VITALS
OXYGEN SATURATION: 98 % | BODY MASS INDEX: 28.59 KG/M2 | SYSTOLIC BLOOD PRESSURE: 155 MMHG | DIASTOLIC BLOOD PRESSURE: 102 MMHG | RESPIRATION RATE: 18 BRPM | WEIGHT: 182.54 LBS | TEMPERATURE: 98.1 F | HEART RATE: 94 BPM

## 2023-03-01 DIAGNOSIS — H66.92 LEFT OTITIS MEDIA: Primary | ICD-10-CM

## 2023-03-01 DIAGNOSIS — G56.02 CARPAL TUNNEL SYNDROME OF LEFT WRIST: ICD-10-CM

## 2023-03-01 DIAGNOSIS — S62.525A CLOSED NONDISPLACED FRACTURE OF DISTAL PHALANX OF LEFT THUMB, INITIAL ENCOUNTER: ICD-10-CM

## 2023-03-01 LAB — GLUCOSE SERPL-MCNC: 126 MG/DL (ref 65–140)

## 2023-03-01 RX ORDER — IBUPROFEN 600 MG/1
600 TABLET ORAL EVERY 6 HOURS PRN
Qty: 30 TABLET | Refills: 0 | Status: SHIPPED | OUTPATIENT
Start: 2023-03-01 | End: 2023-03-11

## 2023-03-01 RX ORDER — AMOXICILLIN AND CLAVULANATE POTASSIUM 875; 125 MG/1; MG/1
1 TABLET, FILM COATED ORAL EVERY 12 HOURS
Qty: 14 TABLET | Refills: 0 | Status: SHIPPED | OUTPATIENT
Start: 2023-03-01 | End: 2023-03-08

## 2023-03-01 RX ORDER — FLUTICASONE PROPIONATE 50 MCG
2 SPRAY, SUSPENSION (ML) NASAL DAILY
Qty: 16 G | Refills: 0 | Status: SHIPPED | OUTPATIENT
Start: 2023-03-01

## 2023-03-01 RX ORDER — OFLOXACIN 3 MG/ML
5 SOLUTION AURICULAR (OTIC) 2 TIMES DAILY
Qty: 5 ML | Refills: 0 | Status: SHIPPED | OUTPATIENT
Start: 2023-03-01

## 2023-03-01 RX ORDER — PREDNISONE 20 MG/1
TABLET ORAL
Qty: 15 TABLET | Refills: 0 | Status: SHIPPED | OUTPATIENT
Start: 2023-03-01

## 2023-03-01 RX ORDER — IBUPROFEN 600 MG/1
600 TABLET ORAL ONCE
Status: COMPLETED | OUTPATIENT
Start: 2023-03-01 | End: 2023-03-01

## 2023-03-01 RX ADMIN — IBUPROFEN 600 MG: 600 TABLET, FILM COATED ORAL at 10:56

## 2023-03-01 NOTE — DISCHARGE INSTRUCTIONS
Keep ear dry  Medication as directed - FU with ENT - there are changes to your ear seen today that need FU

## 2023-03-01 NOTE — ED PROVIDER NOTES
History  Chief Complaint   Patient presents with   • Earache     Pt reports using Debrox for L ear with cerumen build up and now has ear pain  Patient presents emergency department with left ear pain that has been intermittent over the past year but has been particularly worse over the past couple of days  He has been using Debrox and feels like his pain is getting worse  His ear feels clogged  Also complaining of left thumb numbness - int - feels soreness/pressure to it - lennie with he moves his hand  Prior to Admission Medications   Prescriptions Last Dose Informant Patient Reported? Taking?    Alcohol Swabs ([employer] Alcohol Prep Pads) 70 % PADS   Yes No   Sig: USE ONE EVERY DAY   Ascorbic Acid (vitamin C) 1000 MG tablet   No No   Sig: Take 1 tablet (1,000 mg total) by mouth 2 (two) times a day   B-D UF III MINI PEN NEEDLES 31G X 5 MM MISC Past Week  Yes Yes   Sig: USE ONE EVERY DAY   Lantus SoloStar 100 units/mL injection pen 2/28/2023  Yes Yes   Sig: INJECT 10 UNIT UNDER THE SKIN ONCE A DAY   amLODIPine (NORVASC) 5 mg tablet 3/1/2023  No Yes   Sig: Take 1 tablet (5 mg total) by mouth daily   aspirin (ECOTRIN LOW STRENGTH) 81 mg EC tablet 3/1/2023  No Yes   Sig: Take 1 tablet (81 mg total) by mouth daily   atorvastatin (LIPITOR) 40 mg tablet   No No   Sig: Take 1 tablet (40 mg total) by mouth daily with dinner   cholecalciferol (VITAMIN D3) 1,000 units tablet   No No   Sig: Take 2 tablets (2,000 Units total) by mouth daily   losartan (COZAAR) 50 mg tablet   No No   Sig: Take 1 tablet (50 mg total) by mouth daily   metFORMIN (GLUCOPHAGE) 500 mg tablet   Yes No   Sig: Take 500 mg by mouth 2 (two) times a day   metoprolol tartrate (LOPRESSOR) 50 mg tablet   No No   Sig: Take 1 tablet the night before your cardiac CT, take 1 tablet the morning of your cardiac CT, and take the remainder of the tablets with you to the test   pantoprazole (PROTONIX) 40 mg tablet   No No   Sig: Take 1 tablet (40 mg total) by mouth daily in the early morning      Facility-Administered Medications: None       Past Medical History:   Diagnosis Date   • Chest pain    • Diverticulitis    • GERD (gastroesophageal reflux disease)    • HTN (hypertension)    • Pain in left wrist        History reviewed  No pertinent surgical history  Family History   Problem Relation Age of Onset   • No Known Problems Mother    • No Known Problems Father    • No Known Problems Brother      I have reviewed and agree with the history as documented  E-Cigarette/Vaping   • E-Cigarette Use Never User      E-Cigarette/Vaping Substances   • Nicotine No    • THC No    • CBD No    • Flavoring No    • Other No    • Unknown No      Social History     Tobacco Use   • Smoking status: Never   • Smokeless tobacco: Never   Vaping Use   • Vaping Use: Never used   Substance Use Topics   • Alcohol use: Yes     Comment: socially   • Drug use: Never       Review of Systems   Constitutional: Negative for fever  HENT: Positive for congestion and ear pain  Respiratory: Negative  Cardiovascular: Negative  Gastrointestinal: Negative  Musculoskeletal: Positive for arthralgias  All other systems reviewed and are negative  Physical Exam  Physical Exam  Vitals and nursing note reviewed  Constitutional:       Appearance: He is well-developed  HENT:      Head: Normocephalic and atraumatic  Right Ear: Tympanic membrane, ear canal and external ear normal       Left Ear: Ear canal and external ear normal  A middle ear effusion is present  Ears:        Comments: + effusion   Eyes:      Conjunctiva/sclera: Conjunctivae normal    Cardiovascular:      Rate and Rhythm: Normal rate and regular rhythm  Heart sounds: Normal heart sounds  Pulmonary:      Effort: Pulmonary effort is normal       Breath sounds: Normal breath sounds  Abdominal:      General: Bowel sounds are normal       Palpations: Abdomen is soft     Musculoskeletal:         General: Normal range of motion  Hands:       Cervical back: Normal range of motion and neck supple  Comments: Focal tenderness over PIP thumb left hand and reports tingling   + phalens test     Lymphadenopathy:      Cervical: No cervical adenopathy  Skin:     General: Skin is warm  Findings: No rash  Neurological:      Mental Status: He is alert and oriented to person, place, and time  Motor: No abnormal muscle tone        Coordination: Coordination normal    Psychiatric:         Behavior: Behavior normal          Vital Signs  ED Triage Vitals   Temperature Pulse Respirations Blood Pressure SpO2   03/01/23 0959 03/01/23 0959 03/01/23 0959 03/01/23 0959 03/01/23 0959   98 1 °F (36 7 °C) 83 16 (!) 187/94 100 %      Temp Source Heart Rate Source Patient Position - Orthostatic VS BP Location FiO2 (%)   03/01/23 0959 03/01/23 0959 03/01/23 0959 03/01/23 0959 --   Oral Monitor Sitting Right arm       Pain Score       03/01/23 1056       10 - Worst Possible Pain           Vitals:    03/01/23 0959 03/01/23 1058   BP: (!) 187/94 (!) 155/102   Pulse: 83 94   Patient Position - Orthostatic VS: Sitting Sitting         Visual Acuity      ED Medications  Medications   ibuprofen (MOTRIN) tablet 600 mg (600 mg Oral Given 3/1/23 1056)       Diagnostic Studies  Results Reviewed     Procedure Component Value Units Date/Time    Fingerstick Glucose (POCT) [[id number]]  (Normal) Collected: 03/01/23 1146    Lab Status: Final result Updated: 03/01/23 1158     POC Glucose 126 mg/dl                  XR hand 3+ views LEFT   ED Interpretation by Suma Bear PA-C (03/01 1110)   Questionable fx thumb                  Procedures  Procedures         ED Course  ED Course as of 03/01/23 1628   Wed Mar 01, 2023   1139 Discussed with dr Derick Angel - ENT - requesting to also send prednisone - pt is on metformin - doesn't check glucose - will get acucheck before starting prednisone                                SBIRT 22yo+    Flowsheet Row Most Recent Value   SBIRT (22 yo +)    In order to provide better care to our patients, we are screening all of our patients for alcohol and drug use  Would it be okay to ask you these screening questions? No Filed at: 03/01/2023 1030                    Medical Decision Making  Patient has debris in his attic concern for possible cholesteatoma discussed this with ENT  We will treat for infection and effusion  ENT also requesting oral prednisone  Patient is on metformin does not check his glucose at home well check here prior to starting prednisone  Is within normal range and patient agrees to start prednisone  Discussed importance of close ENT follow-up  Patient also is complaining of left thumb pain x-ray questionable fracture will place in a splint  He also has numbness and tingling and positive Phalen's test possible carpal tunnel  We will treat with anti-inflammatory medication and he will follow-up with orthopedics  Carpal tunnel syndrome of left wrist: acute illness or injury  Closed nondisplaced fracture of distal phalanx of left thumb, initial encounter: acute illness or injury  Left otitis media: acute illness or injury  Amount and/or Complexity of Data Reviewed  Labs: ordered  Radiology: ordered and independent interpretation performed  Risk  OTC drugs  Prescription drug management            Disposition  Final diagnoses:   Left otitis media   Closed nondisplaced fracture of distal phalanx of left thumb, initial encounter   Carpal tunnel syndrome of left wrist     Time reflects when diagnosis was documented in both MDM as applicable and the Disposition within this note     Time User Action Codes Description Comment    3/1/2023 10:56 AM Suma Bear [H66 92] Left otitis media     3/1/2023 12:01 PM Suma Bear [S62 525A] Closed nondisplaced fracture of distal phalanx of left thumb, initial encounter     3/1/2023  4:26 PM Suma Bear [G56 02] Carpal tunnel syndrome of left wrist       ED Disposition     ED Disposition   Discharge    Condition   Stable    Date/Time   Wed Mar 1, 2023 10:56 AM    Comment   Khurram Mayorga discharge to home/self care  Follow-up Information     Follow up With Specialties Details Why Contact Info Additional [address] [facility] Donna Cronin 24, DO Otolaryngology   [id number]     [address] [facility]  [address] [facility] [address] [facility] Orthopedic Surgery   [address] [address] [phone] [address], [address], [address] [facility], [address] [facility], [place], [address] [phone]          Discharge Medication List as of 3/1/2023 12:01 PM      START taking these medications    Details   amoxicillin-clavulanate (AUGMENTIN) 875-125 mg per tablet Take 1 tablet by mouth every 12 (twelve) hours for 7 days, Starting Wed 3/1/2023, Until Wed 3/8/2023, Normal      fluticasone (FLONASE) 50 mcg/act nasal spray 2 sprays into each nostril daily, Starting Wed 3/1/2023, Normal      ibuprofen (MOTRIN) 600 mg tablet Take 1 tablet (600 mg total) by mouth every 6 (six) hours as needed for mild pain for up to 10 days, Starting Wed 3/1/2023, Until Sat 3/11/2023 at 2359, Normal      ofloxacin (FLOXIN) 0 3 % otic solution Administer 5 drops into ears 2 (two) times a day, Starting Wed 3/1/2023, Normal      predniSONE 20 mg tablet 2 PO x 2 days 1 PO x 2 days, Normal         CONTINUE these medications which have NOT CHANGED    Details   amLODIPine (NORVASC) 5 mg tablet Take 1 tablet (5 mg total) by mouth daily, Starting Mon 7/19/2021, Normal      aspirin (ECOTRIN LOW STRENGTH) 81 mg EC tablet Take 1 tablet (81 mg total) by mouth daily, Starting Mon 7/19/2021, No Print      B-D UF III MINI PEN NEEDLES 31G X 5 MM MISC USE ONE EVERY DAY, Historical Med      Lantus SoloStar 100 units/mL injection pen INJECT 10 UNIT UNDER THE SKIN ONCE A DAY, Historical Med Alcohol Swabs ([employer] Alcohol Prep Pads) 70 % PADS USE ONE EVERY DAY, Historical Med      Ascorbic Acid (vitamin C) 1000 MG tablet Take 1 tablet (1,000 mg total) by mouth 2 (two) times a day, Starting Mon 8/23/2021, Until Mon 9/27/2021, Normal      atorvastatin (LIPITOR) 40 mg tablet Take 1 tablet (40 mg total) by mouth daily with dinner, Starting Thu 5/27/2021, Until Mon 9/27/2021, Normal      cholecalciferol (VITAMIN D3) 1,000 units tablet Take 2 tablets (2,000 Units total) by mouth daily, Starting Mon 8/23/2021, Until Mon 9/27/2021, Normal      losartan (COZAAR) 50 mg tablet Take 1 tablet (50 mg total) by mouth daily, Starting Thu 5/27/2021, Until Mon 9/27/2021, Normal      metFORMIN (GLUCOPHAGE) 500 mg tablet Take 500 mg by mouth 2 (two) times a day, Starting Wed 9/15/2021, Historical Med      metoprolol tartrate (LOPRESSOR) 50 mg tablet Take 1 tablet the night before your cardiac CT, take 1 tablet the morning of your cardiac CT, and take the remainder of the tablets with you to the test, Normal      pantoprazole (PROTONIX) 40 mg tablet Take 1 tablet (40 mg total) by mouth daily in the early morning, Starting Sun 4/18/2021, Until Mon 9/27/2021, Normal                 PDMP Review       Value Time User    PDMP Reviewed  Yes 8/25/2021  1:44 AM Cm Cook MD          ED Provider  Electronically Signed by           Norm Dakins, PA-C  03/01/23 2616

## 2023-03-01 NOTE — Clinical Note
Ino Canales was seen and treated in our emergency department on 3/1/2023  Diagnosis:     Deb Taylor    He may return on this date: 03/03/2023    Will have splint to his left hand/thumb     If you have any questions or concerns, please don't hesitate to call        Suma Bear PA-C    ______________________________           _______________          _______________  Hospital Representative                              Date                                Time

## 2023-03-01 NOTE — TELEPHONE ENCOUNTER
Patient is being referred to a orthopedics  Please schedule accordingly      [facility]   [phone]

## 2023-03-03 ENCOUNTER — OFFICE VISIT (OUTPATIENT)
Dept: OBGYN CLINIC | Facility: CLINIC | Age: 58
End: 2023-03-03

## 2023-03-03 ENCOUNTER — HOSPITAL ENCOUNTER (OUTPATIENT)
Dept: RADIOLOGY | Facility: HOSPITAL | Age: 58
End: 2023-03-03
Attending: STUDENT IN AN ORGANIZED HEALTH CARE EDUCATION/TRAINING PROGRAM

## 2023-03-03 VITALS — BODY MASS INDEX: 28.97 KG/M2 | HEIGHT: 67 IN | WEIGHT: 184.6 LBS

## 2023-03-03 DIAGNOSIS — M79.645 PAIN OF LEFT THUMB: ICD-10-CM

## 2023-03-03 DIAGNOSIS — M79.641 RIGHT HAND PAIN: ICD-10-CM

## 2023-03-03 DIAGNOSIS — G56.03 BILATERAL CARPAL TUNNEL SYNDROME: Primary | ICD-10-CM

## 2023-03-03 DIAGNOSIS — S62.525A CLOSED NONDISPLACED FRACTURE OF DISTAL PHALANX OF LEFT THUMB, INITIAL ENCOUNTER: ICD-10-CM

## 2023-03-03 RX ORDER — BUPIVACAINE HYDROCHLORIDE 2.5 MG/ML
1 INJECTION, SOLUTION INFILTRATION; PERINEURAL
Status: COMPLETED | OUTPATIENT
Start: 2023-03-03 | End: 2023-03-03

## 2023-03-03 RX ORDER — BETAMETHASONE SODIUM PHOSPHATE AND BETAMETHASONE ACETATE 3; 3 MG/ML; MG/ML
6 INJECTION, SUSPENSION INTRA-ARTICULAR; INTRALESIONAL; INTRAMUSCULAR; SOFT TISSUE
Status: COMPLETED | OUTPATIENT
Start: 2023-03-03 | End: 2023-03-03

## 2023-03-03 RX ADMIN — BETAMETHASONE SODIUM PHOSPHATE AND BETAMETHASONE ACETATE 6 MG: 3; 3 INJECTION, SUSPENSION INTRA-ARTICULAR; INTRALESIONAL; INTRAMUSCULAR; SOFT TISSUE at 09:57

## 2023-03-03 RX ADMIN — BUPIVACAINE HYDROCHLORIDE 1 ML: 2.5 INJECTION, SOLUTION INFILTRATION; PERINEURAL at 09:57

## 2023-03-03 NOTE — PROGRESS NOTES
ORTHOPAEDIC HAND, WRIST, AND ELBOW OFFICE  VISIT       ASSESSMENT/PLAN:      Diagnoses and all orders for this visit:    Bilateral carpal tunnel syndrome  -     Cock Up Wrist Splint  -     EMG 2 Limb Upper Extremity; Future  -     Hand/upper extremity injection: bilateral carpal tunnel  -     bupivacaine (MARCAINE) 0 25 % injection 1 mL  -     betamethasone acetate-betamethasone sodium phosphate (CELESTONE) injection 6 mg  -     bupivacaine (MARCAINE) 0 25 % injection 1 mL  -     betamethasone acetate-betamethasone sodium phosphate (CELESTONE) injection 6 mg    Pain of left thumb  -     XR thumb left first digit-min 2v; Future  -     Ambulatory Referral to Hand Surgery    Right hand pain  -     XR hand 3+ vw right; Future      13-year-old male with suspected bilateral carpal tunnel syndrome    X-rays were reviewed in the office today which demonstrate no acute fractures or dislocations  There is evidence of prior injury left thumb  Treatment options were discussed in the form of bracing and bilateral carpal tunnel injections  The patient was agreeable to this  He consented and underwent bilateral carpal tunnel injections in the office today without any complications  He was fitted and provided with bilateral cock-up wrist braces he was advised to wear at night  We did discuss if he sees good relief from these injections he would likely see good relief from surgical intervention  I did explain, numbness to the dorsal aspect of the hand is unlikely related to carpal tunnel  A BUE EMG was ordered in the office today to evaluate for carpal tunnel syndrome  The patient verbalized understanding of exam findings and treatment plan  We engaged in the shared decision-making process and treatment options were discussed at length with the patient  Surgical and conservative management discussed today along with risks and benefits  Follow Up:   After testing       To Do Next Visit:  Re-evaluation of current issue    General Discussions:  Carpal Tunnel Syndrome: The anatomy and physiology of carpal tunnel syndrome was discussed with the patient today  Increase pressure localized under the transverse carpal ligament can cause pain, numbness, tingling, or dysesthesias within the median nerve distribution as well as feelings of fatigue, clumsiness, or awkwardness  These symptoms typically occur at night and worse in the morning upon waking  Eventually, untreated carpal tunnel syndrome can result in weakness and permanent loss of muscle within the thenar compartment of the hand  Treatment options were discussed with the patient  Conservative treatment includes nocturnal resting splints to keep the nerve in a neutral position, ergonomic changes within the work or home environment, activity modification, and tendon gliding exercises  Vitamin B6 one tablet daily over the counter may helpful to reduce symptoms  Steroid injections within the carpal canal can help a majority of patients, however this is often self-limited in a majority of patients  Surgical intervention to divide the transverse carpal ligament typically results in a long-lasting relief of the patient's complaints, with the recurrence rate of less than 1%  Lorraine Puentes MD  Attending, Orthopaedic Surgery  Hand, Wrist, and Elbow Surgery  [facility]    ____________________________________________________________________________________________________________________________________________      CHIEF COMPLAINT:  Chief Complaint   Patient presents with   • Left Thumb - Pain       SUBJECTIVE:  Natalie Sánchez is a 61y o  year old RHD male who presents today for evaluation and treatment of left thumb pain   The patient notes numbness along the dorsal aspect of his left thumb  He states this has been ongoing for the past 3 weeks however, has been getting worse over the past two weeks  He states he feels a tearing sensation in the thumb if he goes to reach for something  He denies any recent injury or trauma however, 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  He also notes numbness to his right long finger which he states has also been ongoing for the past 2 weeks  He states this is constant  He has been taking Motrin as needed for pain  I have personally reviewed all the relevant PMH, PSH, SH, FH, Medications and allergies      PAST MEDICAL HISTORY:  Past Medical History:   Diagnosis Date   • Chest pain    • Diverticulitis    • GERD (gastroesophageal reflux disease)    • HTN (hypertension)    • Pain in left wrist        PAST SURGICAL HISTORY:  History reviewed  No pertinent surgical history      FAMILY HISTORY:  Family History   Problem Relation Age of Onset   • No Known Problems Mother    • No Known Problems Father    • No Known Problems Brother        SOCIAL HISTORY:  Social History     Tobacco Use   • Smoking status: Never   • Smokeless tobacco: Never   Vaping Use   • Vaping Use: Never used   Substance Use Topics   • Alcohol use: Yes     Comment: socially   • Drug use: Never       MEDICATIONS:    Current Outpatient Medications:   •  Alcohol Swabs ([employer] Alcohol Prep Pads) 70 % PADS, USE ONE EVERY DAY, Disp: , Rfl:   •  amLODIPine (NORVASC) 5 mg tablet, Take 1 tablet (5 mg total) by mouth daily, Disp: 30 tablet, Rfl: 3  •  amoxicillin-clavulanate (AUGMENTIN) 875-125 mg per tablet, Take 1 tablet by mouth every 12 (twelve) hours for 7 days, Disp: 14 tablet, Rfl: 0  •  aspirin (ECOTRIN LOW STRENGTH) 81 mg EC tablet, Take 1 tablet (81 mg total) by mouth daily, Disp: , Rfl:   •  B-D UF III MINI PEN NEEDLES 31G X 5 MM MISC, USE ONE EVERY DAY, Disp: , Rfl:   •  fluticasone (FLONASE) 50 mcg/act nasal spray, 2 sprays into each nostril daily, Disp: 16 g, Rfl: 0  • ibuprofen (MOTRIN) 600 mg tablet, Take 1 tablet (600 mg total) by mouth every 6 (six) hours as needed for mild pain for up to 10 days, Disp: 30 tablet, Rfl: 0  •  Lantus SoloStar 100 units/mL injection pen, INJECT 10 UNIT UNDER THE SKIN ONCE A DAY, Disp: , Rfl:   •  metFORMIN (GLUCOPHAGE) 500 mg tablet, Take 500 mg by mouth 2 (two) times a day, Disp: , Rfl:   •  metoprolol tartrate (LOPRESSOR) 50 mg tablet, Take 1 tablet the night before your cardiac CT, take 1 tablet the morning of your cardiac CT, and take the remainder of the tablets with you to the test, Disp: 4 tablet, Rfl: 0  •  ofloxacin (FLOXIN) 0 3 % otic solution, Administer 5 drops into ears 2 (two) times a day, Disp: 5 mL, Rfl: 0  •  predniSONE 20 mg tablet, 2 PO x 2 days 1 PO x 2 days, Disp: 15 tablet, Rfl: 0  •  Ascorbic Acid (vitamin C) 1000 MG tablet, Take 1 tablet (1,000 mg total) by mouth 2 (two) times a day, Disp: 60 tablet, Rfl: 0  •  atorvastatin (LIPITOR) 40 mg tablet, Take 1 tablet (40 mg total) by mouth daily with dinner, Disp: 30 tablet, Rfl: 3  •  cholecalciferol (VITAMIN D3) 1,000 units tablet, Take 2 tablets (2,000 Units total) by mouth daily, Disp: 60 tablet, Rfl: 0  •  losartan (COZAAR) 50 mg tablet, Take 1 tablet (50 mg total) by mouth daily, Disp: 30 tablet, Rfl: 3  •  pantoprazole (PROTONIX) 40 mg tablet, Take 1 tablet (40 mg total) by mouth daily in the early morning, Disp: 30 tablet, Rfl: 0  No current facility-administered medications for this visit  ALLERGIES:  No Known Allergies        REVIEW OF SYSTEMS:  Musculoskeletal:        As noted in HPI  All other systems reviewed and are negative  VITALS:  There were no vitals filed for this visit      LABS:  HgA1c:   Lab Results   Component Value Date    HGBA1C 9 9 09/13/2021     BMP:   Lab Results   Component Value Date    CALCIUM 8 8 09/04/2021    K 4 4 09/04/2021    CO2 31 09/04/2021     09/04/2021    BUN 19 09/04/2021    CREATININE 0 88 09/04/2021 _____________________________________________________  PHYSICAL EXAMINATION:  General: well developed and well nourished, alert, oriented times 3 and appears comfortable  Psychiatric: Normal  HEENT: Normocephalic, Atraumatic Trachea Midline, No torticollis  Pulmonary: No audible wheezing or respiratory distress   Abdomen/GI: Non tender, non distended   Cardiovascular: No pitting edema, 2+ radial pulse   Skin: No masses, erythema, lacerations, fluctation, ulcerations  Neurovascular: Sensation Intact to the Median, Ulnar, Radial Nerve, Motor Intact to the Median, Ulnar, Radial Nerve and Pulses Intact  Musculoskeletal: Normal, except as noted in detailed exam and in HPI  MUSCULOSKELETAL EXAMINATION:  Bilateral hand  5/5 APB  + tinel's at the wrist   + Phalen's   Compartments soft  Brisk capillary refill     ___________________________________________________  STUDIES REVIEWED:  Xrays of the left thumb were reviewed in PACS by Dr Barbara Epperson and demonstrate no acute fractures or dislocations  evidence of prior injury  x-rays right hand demonstrate no osseous abnormalities  PROCEDURES PERFORMED:  Hand/upper extremity injection: bilateral carpal tunnel  [place] Protocol:  Consent: Verbal consent obtained    Consent given by: patient  Patient identity confirmed: verbally with patient    Supporting Documentation  Indications: pain   Procedure Details  Condition:carpal tunnel syndrome Site: bilateral carpal tunnel   Preparation: Patient was prepped and draped in the usual sterile fashion  Needle size: 25 G  Ultrasound guidance: no  Medications (Right): 1 mL bupivacaine 0 25 %; 6 mg betamethasone acetate-betamethasone sodium phosphate 6 (3-3) mg/mLMedications (Left): 1 mL bupivacaine 0 25 %; 6 mg betamethasone acetate-betamethasone sodium phosphate 6 (3-3) mg/mL   Patient tolerance: patient tolerated the procedure well with no immediate complications  Dressing:  Sterile dressing applied _____________________________________________________      Krystyna Mejia    I,:  Asia Elizabeth MA am acting as a scribe while in the presence of the attending physician :       I,:  Javier Hardy MD personally performed the services described in this documentation    as scribed in my presence :

## 2023-09-21 ENCOUNTER — APPOINTMENT (EMERGENCY)
Dept: CT IMAGING | Facility: HOSPITAL | Age: 58
End: 2023-09-21
Payer: COMMERCIAL

## 2023-09-21 ENCOUNTER — HOSPITAL ENCOUNTER (EMERGENCY)
Facility: HOSPITAL | Age: 58
Discharge: HOME | End: 2023-09-21
Attending: EMERGENCY MEDICINE
Payer: COMMERCIAL

## 2023-09-21 VITALS
WEIGHT: 175 LBS | HEART RATE: 74 BPM | TEMPERATURE: 97.7 F | HEIGHT: 67 IN | DIASTOLIC BLOOD PRESSURE: 77 MMHG | BODY MASS INDEX: 27.47 KG/M2 | SYSTOLIC BLOOD PRESSURE: 158 MMHG | OXYGEN SATURATION: 100 % | RESPIRATION RATE: 16 BRPM

## 2023-09-21 DIAGNOSIS — K62.5 BRBPR (BRIGHT RED BLOOD PER RECTUM): ICD-10-CM

## 2023-09-21 DIAGNOSIS — K57.90 DIVERTICULOSIS: Primary | ICD-10-CM

## 2023-09-21 LAB
ABO GROUP BLD: NORMAL
ABO GROUP BLD: NORMAL
ALBUMIN SERPL BCP-MCNC: 4.5 G/DL (ref 3.5–5)
ALP SERPL-CCNC: 69 U/L (ref 34–104)
ALT SERPL W P-5'-P-CCNC: 14 U/L (ref 7–52)
ANION GAP SERPL CALCULATED.3IONS-SCNC: 7 MMOL/L
AST SERPL W P-5'-P-CCNC: 16 U/L (ref 13–39)
BASOPHILS # BLD AUTO: 0.02 THOUSANDS/ÂΜL (ref 0–0.1)
BASOPHILS NFR BLD AUTO: 0 % (ref 0–1)
BILIRUB SERPL-MCNC: 0.37 MG/DL (ref 0.2–1)
BILIRUB UR QL STRIP: NEGATIVE
BLD GP AB SCN SERPL QL: NEGATIVE
BUN SERPL-MCNC: 16 MG/DL (ref 5–25)
CALCIUM SERPL-MCNC: 9.4 MG/DL (ref 8.4–10.2)
CHLORIDE SERPL-SCNC: 102 MMOL/L (ref 96–108)
CLARITY UR: CLEAR
CO2 SERPL-SCNC: 30 MMOL/L (ref 21–32)
COLOR UR: YELLOW
CREAT SERPL-MCNC: 0.94 MG/DL (ref 0.6–1.3)
EOSINOPHIL # BLD AUTO: 0.13 THOUSAND/ÂΜL (ref 0–0.61)
EOSINOPHIL NFR BLD AUTO: 2 % (ref 0–6)
ERYTHROCYTE [DISTWIDTH] IN BLOOD BY AUTOMATED COUNT: 12.5 % (ref 11.6–15.1)
GFR SERPL CREATININE-BSD FRML MDRD: 88 ML/MIN/1.73SQ M
GLUCOSE SERPL-MCNC: 111 MG/DL (ref 65–140)
GLUCOSE UR STRIP-MCNC: NEGATIVE MG/DL
HCT VFR BLD AUTO: 42 % (ref 36.5–49.3)
HEMOCCULT STL QL IA: POSITIVE
HGB BLD-MCNC: 13.3 G/DL (ref 12–17)
HGB UR QL STRIP.AUTO: NEGATIVE
IMM GRANULOCYTES # BLD AUTO: 0.01 THOUSAND/UL (ref 0–0.2)
IMM GRANULOCYTES NFR BLD AUTO: 0 % (ref 0–2)
KETONES UR STRIP-MCNC: NEGATIVE MG/DL
LACTATE SERPL-SCNC: 1.3 MMOL/L (ref 0.5–2)
LEUKOCYTE ESTERASE UR QL STRIP: NEGATIVE
LIPASE SERPL-CCNC: 14 U/L (ref 11–82)
LYMPHOCYTES # BLD AUTO: 2.6 THOUSANDS/ÂΜL (ref 0.6–4.47)
LYMPHOCYTES NFR BLD AUTO: 45 % (ref 14–44)
MCH RBC QN AUTO: 26.5 PG (ref 26.8–34.3)
MCHC RBC AUTO-ENTMCNC: 31.7 G/DL (ref 31.4–37.4)
MCV RBC AUTO: 84 FL (ref 82–98)
MONOCYTES # BLD AUTO: 0.46 THOUSAND/ÂΜL (ref 0.17–1.22)
MONOCYTES NFR BLD AUTO: 8 % (ref 4–12)
NEUTROPHILS # BLD AUTO: 2.62 THOUSANDS/ÂΜL (ref 1.85–7.62)
NEUTS SEG NFR BLD AUTO: 45 % (ref 43–75)
NITRITE UR QL STRIP: NEGATIVE
NRBC BLD AUTO-RTO: 0 /100 WBCS
PH UR STRIP.AUTO: 7 [PH]
PLATELET # BLD AUTO: 175 THOUSANDS/UL (ref 149–390)
PMV BLD AUTO: 9.2 FL (ref 8.9–12.7)
POTASSIUM SERPL-SCNC: 3.7 MMOL/L (ref 3.5–5.3)
PROT SERPL-MCNC: 7 G/DL (ref 6.4–8.4)
PROT UR STRIP-MCNC: NEGATIVE MG/DL
RBC # BLD AUTO: 5.02 MILLION/UL (ref 3.88–5.62)
RH BLD: POSITIVE
RH BLD: POSITIVE
SODIUM SERPL-SCNC: 139 MMOL/L (ref 135–147)
SP GR UR STRIP.AUTO: 1.01 (ref 1–1.03)
SPECIMEN EXPIRATION DATE: NORMAL
UROBILINOGEN UR QL STRIP.AUTO: 0.2 E.U./DL
WBC # BLD AUTO: 5.84 THOUSAND/UL (ref 4.31–10.16)

## 2023-09-21 PROCEDURE — 99285 EMERGENCY DEPT VISIT HI MDM: CPT

## 2023-09-21 PROCEDURE — 36415 COLL VENOUS BLD VENIPUNCTURE: CPT | Performed by: PHYSICIAN ASSISTANT

## 2023-09-21 PROCEDURE — 86850 RBC ANTIBODY SCREEN: CPT | Performed by: PHYSICIAN ASSISTANT

## 2023-09-21 PROCEDURE — 81003 URINALYSIS AUTO W/O SCOPE: CPT | Performed by: PHYSICIAN ASSISTANT

## 2023-09-21 PROCEDURE — 85025 COMPLETE CBC W/AUTO DIFF WBC: CPT | Performed by: PHYSICIAN ASSISTANT

## 2023-09-21 PROCEDURE — 96360 HYDRATION IV INFUSION INIT: CPT

## 2023-09-21 PROCEDURE — 99284 EMERGENCY DEPT VISIT MOD MDM: CPT | Performed by: PHYSICIAN ASSISTANT

## 2023-09-21 PROCEDURE — 83605 ASSAY OF LACTIC ACID: CPT | Performed by: PHYSICIAN ASSISTANT

## 2023-09-21 PROCEDURE — 80053 COMPREHEN METABOLIC PANEL: CPT | Performed by: PHYSICIAN ASSISTANT

## 2023-09-21 PROCEDURE — G1004 CDSM NDSC: HCPCS

## 2023-09-21 PROCEDURE — G0328 FECAL BLOOD SCRN IMMUNOASSAY: HCPCS | Performed by: PHYSICIAN ASSISTANT

## 2023-09-21 PROCEDURE — 86900 BLOOD TYPING SEROLOGIC ABO: CPT | Performed by: PHYSICIAN ASSISTANT

## 2023-09-21 PROCEDURE — 74177 CT ABD & PELVIS W/CONTRAST: CPT

## 2023-09-21 PROCEDURE — 96361 HYDRATE IV INFUSION ADD-ON: CPT

## 2023-09-21 PROCEDURE — 83690 ASSAY OF LIPASE: CPT | Performed by: PHYSICIAN ASSISTANT

## 2023-09-21 PROCEDURE — 87505 NFCT AGENT DETECTION GI: CPT | Performed by: PHYSICIAN ASSISTANT

## 2023-09-21 PROCEDURE — 86901 BLOOD TYPING SEROLOGIC RH(D): CPT | Performed by: PHYSICIAN ASSISTANT

## 2023-09-21 RX ADMIN — IOHEXOL 100 ML: 350 INJECTION, SOLUTION INTRAVENOUS at 10:39

## 2023-09-21 RX ADMIN — SODIUM CHLORIDE 1000 ML: 0.9 INJECTION, SOLUTION INTRAVENOUS at 09:32

## 2023-09-21 NOTE — DISCHARGE INSTRUCTIONS
Please return to the emergency department for worsening symptoms including chest pain, shortness of breath, dizziness, lightheadedness, fever greater than 103, severe pain, inability to walk, fainting episodes, etc.. Please follow-up with your family practice provider as soon as possible. With worsening bleeding, please return to the emergency department. Follow-up with gastroenterology. We will follow-up on your stool samples; if they are positive we will give you a call back.

## 2023-09-21 NOTE — ED PROVIDER NOTES
History  Chief Complaint   Patient presents with   • Rectal Bleeding     Pt reports having 2 loose stools with bright red blood starting this morning at approx 0600. Denies pain but states "I have the sensation to go and its uncomfortable."      This is a 79-year-old male with past medical history significant for hypertension and diverticulitis presenting to the emergency department today for rectal bleeding associated with generalized abdominal pain. Patient notes he had 2 episodes of diarrhea this morning which contained dark red blood. Blood was mixed in with the patient's stool. He notes generalized abdominal pain that is not worse in 1 region of the abdomen than another. He denies any associated nausea or vomiting. He has no history of inflammatory bowel disease. He denies any rectal pain. He denies any recent suspicious food intake but does note that he had a glass of garlic water yesterday. He is not on anticoagulation but does take antiplatelets daily. He has no dizziness, lightheadedness, or visual disturbances. He has no chest pain or shortness of breath. The patient denies other complaints at this time. History provided by:  Patient   used: No    Rectal Bleeding - Minor  Quality:  Maroon  Amount: Moderate  Timing:  Intermittent  Chronicity:  New  Similar prior episodes: no    Relieved by:  Nothing  Worsened by:  Nothing  Ineffective treatments:  None tried  Associated symptoms: abdominal pain    Associated symptoms: no dizziness, no epistaxis, no fever, no hematemesis, no light-headedness, no loss of consciousness, no recent illness and no vomiting    Risk factors: no anticoagulant use        Prior to Admission Medications   Prescriptions Last Dose Informant Patient Reported? Taking?    Alcohol Swabs ([employer] Alcohol Prep Pads) 70 % PADS  Self Yes No   Sig: USE ONE EVERY DAY   Ascorbic Acid (vitamin C) 1000 MG tablet  Self No No   Sig: Take 1 tablet (1,000 mg total) by mouth 2 (two) times a day   B-D UF III MINI PEN NEEDLES 31G X 5 MM MISC  Self Yes No   Sig: USE ONE EVERY DAY   Lantus SoloStar 100 units/mL injection pen  Self Yes No   Sig: INJECT 10 UNIT UNDER THE SKIN ONCE A DAY   amLODIPine (NORVASC) 5 mg tablet  Self No No   Sig: Take 1 tablet (5 mg total) by mouth daily   aspirin (ECOTRIN LOW STRENGTH) 81 mg EC tablet  Self No No   Sig: Take 1 tablet (81 mg total) by mouth daily   atorvastatin (LIPITOR) 40 mg tablet  Self No No   Sig: Take 1 tablet (40 mg total) by mouth daily with dinner   cholecalciferol (VITAMIN D3) 1,000 units tablet  Self No No   Sig: Take 2 tablets (2,000 Units total) by mouth daily   fluticasone (FLONASE) 50 mcg/act nasal spray   No No   Si sprays into each nostril daily   ibuprofen (MOTRIN) 600 mg tablet   No No   Sig: Take 1 tablet (600 mg total) by mouth every 6 (six) hours as needed for mild pain for up to 10 days   losartan (COZAAR) 50 mg tablet  Self No No   Sig: Take 1 tablet (50 mg total) by mouth daily   metFORMIN (GLUCOPHAGE) 500 mg tablet  Self Yes No   Sig: Take 500 mg by mouth 2 (two) times a day   metoprolol tartrate (LOPRESSOR) 50 mg tablet  Self No No   Sig: Take 1 tablet the night before your cardiac CT, take 1 tablet the morning of your cardiac CT, and take the remainder of the tablets with you to the test   ofloxacin (FLOXIN) 0.3 % otic solution   No No   Sig: Administer 5 drops into ears 2 (two) times a day   pantoprazole (PROTONIX) 40 mg tablet  Self No No   Sig: Take 1 tablet (40 mg total) by mouth daily in the early morning   predniSONE 20 mg tablet   No No   Si PO x 2 days 1 PO x 2 days      Facility-Administered Medications: None       Past Medical History:   Diagnosis Date   • Chest pain    • Diverticulitis    • GERD (gastroesophageal reflux disease)    • HTN (hypertension)    • Pain in left wrist        History reviewed. No pertinent surgical history.     Family History   Problem Relation Age of Onset   • No Known Problems Mother    • No Known Problems Father    • No Known Problems Brother      I have reviewed and agree with the history as documented. E-Cigarette/Vaping   • E-Cigarette Use Never User      E-Cigarette/Vaping Substances   • Nicotine No    • THC No    • CBD No    • Flavoring No    • Other No    • Unknown No      Social History     Tobacco Use   • Smoking status: Never   • Smokeless tobacco: Never   Vaping Use   • Vaping Use: Never used   Substance Use Topics   • Alcohol use: Yes     Comment: socially   • Drug use: Never       Review of Systems   Constitutional: Negative for appetite change, chills, diaphoresis, fatigue and fever. HENT: Negative for nosebleeds. Respiratory: Negative for cough, chest tightness, shortness of breath and wheezing. Cardiovascular: Negative for chest pain, palpitations and leg swelling. Gastrointestinal: Positive for abdominal pain, blood in stool, diarrhea and hematochezia. Negative for constipation, hematemesis, nausea and vomiting. Musculoskeletal: Negative for neck pain and neck stiffness. Skin: Negative for rash and wound. Neurological: Negative for dizziness, seizures, loss of consciousness, syncope, weakness, light-headedness, numbness and headaches. Psychiatric/Behavioral: Negative for confusion. All other systems reviewed and are negative. Physical Exam  Physical Exam  Vitals and nursing note reviewed. Constitutional:       General: He is not in acute distress. Appearance: Normal appearance. He is normal weight. He is not ill-appearing, toxic-appearing or diaphoretic. HENT:      Head: Normocephalic and atraumatic. Nose: Nose normal. No congestion or rhinorrhea. Mouth/Throat:      Mouth: Mucous membranes are moist.      Pharynx: No oropharyngeal exudate or posterior oropharyngeal erythema. Eyes:      General: No scleral icterus. Right eye: No discharge. Left eye: No discharge.       Conjunctiva/sclera: Conjunctivae normal.   Cardiovascular:      Rate and Rhythm: Normal rate and regular rhythm. Pulses: Normal pulses. Heart sounds: Normal heart sounds. No murmur heard. No friction rub. No gallop. Pulmonary:      Effort: Pulmonary effort is normal. No respiratory distress. Breath sounds: Normal breath sounds. No stridor. No wheezing, rhonchi or rales. Chest:      Chest wall: No tenderness. Abdominal:      General: Abdomen is flat. There is no distension. Palpations: Abdomen is soft. Tenderness: There is no abdominal tenderness. There is no right CVA tenderness, left CVA tenderness, guarding or rebound. Comments: The patient's abdomen is soft, nontender, nondistended, and without organomegaly; no rebound, Rovsing, or McBurney's point tenderness   Genitourinary:     Comments: The patient defers rectal examination  Musculoskeletal:         General: Normal range of motion. Cervical back: Normal range of motion. No rigidity. Right lower leg: No edema. Left lower leg: No edema. Skin:     General: Skin is warm and dry. Capillary Refill: Capillary refill takes less than 2 seconds. Coloration: Skin is not jaundiced or pale. Neurological:      General: No focal deficit present. Mental Status: He is alert and oriented to person, place, and time. Mental status is at baseline.    Psychiatric:         Mood and Affect: Mood normal.         Behavior: Behavior normal.         Vital Signs  ED Triage Vitals   Temperature Pulse Respirations Blood Pressure SpO2   09/21/23 0859 09/21/23 0856 09/21/23 0856 09/21/23 0856 09/21/23 0856   97.7 °F (36.5 °C) 83 18 (!) 195/101 100 %      Temp Source Heart Rate Source Patient Position - Orthostatic VS BP Location FiO2 (%)   09/21/23 0859 09/21/23 0856 09/21/23 0856 09/21/23 0856 --   Oral Monitor Sitting Left arm       Pain Score       09/21/23 0856       No Pain           Vitals:    09/21/23 0856 09/21/23 1151   BP: (!) 195/101 158/77 Pulse: 83 74   Patient Position - Orthostatic VS: Sitting Lying         Visual Acuity      ED Medications  Medications   sodium chloride 0.9 % bolus 1,000 mL (0 mL Intravenous Stopped 9/21/23 1225)   iohexol (OMNIPAQUE) 350 MG/ML injection (SINGLE-DOSE) 100 mL (100 mL Intravenous Given 9/21/23 1039)       Diagnostic Studies  Results Reviewed     Procedure Component Value Units Date/Time    CBC and differential [[id number]]  (Abnormal) Collected: 09/21/23 0934    Lab Status: Final result Specimen: Blood from Arm, Right Updated: 09/21/23 1107     WBC 5.84 Thousand/uL      RBC 5.02 Million/uL      Hemoglobin 13.3 g/dL      Hematocrit 42.0 %      MCV 84 fL      MCH 26.5 pg      MCHC 31.7 g/dL      RDW 12.5 %      MPV 9.2 fL      Platelets 820 Thousands/uL      nRBC 0 /100 WBCs      Neutrophils Relative 45 %      Immat GRANS % 0 %      Lymphocytes Relative 45 %      Monocytes Relative 8 %      Eosinophils Relative 2 %      Basophils Relative 0 %      Neutrophils Absolute 2.62 Thousands/µL      Immature Grans Absolute 0.01 Thousand/uL      Lymphocytes Absolute 2.60 Thousands/µL      Monocytes Absolute 0.46 Thousand/µL      Eosinophils Absolute 0.13 Thousand/µL      Basophils Absolute 0.02 Thousands/µL     Comprehensive metabolic panel [[id number]] Collected: 09/21/23 0934    Lab Status: Final result Specimen: Blood from Arm, Right Updated: 09/21/23 1025     Sodium 139 mmol/L      Potassium 3.7 mmol/L      Chloride 102 mmol/L      CO2 30 mmol/L      ANION GAP 7 mmol/L      BUN 16 mg/dL      Creatinine 0.94 mg/dL      Glucose 111 mg/dL      Calcium 9.4 mg/dL      AST 16 U/L      ALT 14 U/L      Alkaline Phosphatase 69 U/L      Total Protein 7.0 g/dL      Albumin 4.5 g/dL      Total Bilirubin 0.37 mg/dL      eGFR 88 ml/min/1.73sq m     Narrative:      [place] guidelines for Chronic Kidney Disease (CKD):   •  Stage 1 with normal or high GFR (GFR > 90 mL/min/1.73 square meters)  •  Stage 2 Mild CKD (GFR = 60-89 mL/min/1.73 square meters)  •  Stage 3A Moderate CKD (GFR = 45-59 mL/min/1.73 square meters)  •  Stage 3B Moderate CKD (GFR = 30-44 mL/min/1.73 square meters)  •  Stage 4 Severe CKD (GFR = 15-29 mL/min/1.73 square meters)  •  Stage 5 End Stage CKD (GFR <15 mL/min/1.73 square meters)  Note: GFR calculation is accurate only with a steady state creatinine    Lipase [[id number]]  (Normal) Collected: 09/21/23 0934    Lab Status: Final result Specimen: Blood from Arm, Right Updated: 09/21/23 1025     Lipase 14 u/L     Occult Blood, Fecal Immunochemical [[id number]]  (Abnormal) Collected: 09/21/23 0939    Lab Status: Final result Specimen: Stool from Per Rectum Updated: 09/21/23 1000     OCCULT BLD, FECAL IMMUNOLOGICAL Positive    Narrative:        Performed by Fecal Immunochemical Test.    Lactic acid, plasma (w/reflex if result > 2.0) [[id number]]  (Normal) Collected: 09/21/23 0934    Lab Status: Final result Specimen: Blood from Arm, Right Updated: 09/21/23 0956     LACTIC ACID 1.3 mmol/L     Narrative:      Result may be elevated if tourniquet was used during collection. UA w Reflex to Microscopic w Reflex to Culture [[id number]]  (Normal) Collected: 09/21/23 0940    Lab Status: Final result Specimen: Urine, Clean Catch Updated: 09/21/23 0952     Color, UA Yellow     Clarity, UA Clear     Specific Gravity, UA 1.015     pH, UA 7.0     Leukocytes, UA Negative     Nitrite, UA Negative     Protein, UA Negative mg/dl      Glucose, UA Negative mg/dl      Ketones, UA Negative mg/dl      Urobilinogen, UA 0.2 E.U./dl      Bilirubin, UA Negative     Occult Blood, UA Negative    Stool Enteric Bacterial Panel by PCR [[id number]] Collected: 09/21/23 0942    Lab Status: In process Specimen: Stool from Per Rectum Updated: 09/21/23 0948    Clostridium difficile toxin by PCR with EIA [[id number]] Collected: 09/21/23 0942    Lab Status:  In process Specimen: Stool from Per Rectum Updated: 09/21/23 0948                 CT abdomen pelvis with contrast   Final Result by Asher Zarco MD (09/21 1140)      No acute pathology identified in the abdomen or pelvis. Diffuse colonic diverticulosis without diverticulitis. Resident: Maynor Barrera, the attending radiologist, have reviewed the images and agree with the final report above. Workstation performed: [id number]                    Procedures  Procedures         ED Course                               SBIRT 20yo+    Flowsheet Row Most Recent Value   Initial Alcohol Screen: US AUDIT-C     1. How often do you have a drink containing alcohol? 0 Filed at: 09/21/2023 0858   Audit-C Score 0 Filed at: 09/21/2023 6146   KIKI: How many times in the past year have you. .. Used an illegal drug or used a prescription medication for non-medical reasons? Never Filed at: 09/21/2023 2062                    Medical Decision Making  This is a 65-year-old male presenting to the emergency department today for rectal bleeding. This is associated with generalized abdominal pain. No chest pain or shortness of breath. Vitals are stable. Patient had 1 bowel movement while you are in the emergency department which did appear to be maroon-tinged. On physical examination, the patient's abdomen is soft, nontender, nondistended, and without organomegaly. Hemoglobin appears stable. He was given intravenous fluids while here in the emergency department. No melena. Occult blood was positive. No leukocytosis or lactic acidosis. Negative urinalysis. CMP is reassuring. I reviewed prior notes. CT abdomen and pelvis shows diverticulosis without diverticulitis. The patient's hemoglobin is overall stable and therefore the patient is stable for discharge at this time. Patient feels well while here in the ER. Referral placed for gastroenterology. Follow-up with gastroenterology. Strict return precautions were given. Recommend PCP follow-up as soon as possible.  The patient and/or patient's proxy verify their understanding and agree to the plan at this time. All questions answered to the patient and/or their proxy's satisfaction. All labs reviewed and utilized in the medical decision making process (if labs were ordered). Portions of the record may have been created with voice recognition software.  Occasional wrong word or "sound a like" substitutions may have occurred due to the inherent limitations of voice recognition software.  Read the chart carefully and recognize, using context, where substitutions have occurred. BRBPR (bright red blood per rectum): undiagnosed new problem with uncertain prognosis  Diverticulosis: complicated acute illness or injury  Amount and/or Complexity of Data Reviewed  External Data Reviewed: radiology and notes. Labs: ordered. Decision-making details documented in ED Course. Radiology: ordered. Decision-making details documented in ED Course. Risk  Prescription drug management. Disposition  Final diagnoses:   Diverticulosis   BRBPR (bright red blood per rectum)     Time reflects when diagnosis was documented in both MDM as applicable and the Disposition within this note     Time User Action Codes Description Comment    9/21/2023 12:03 PM Jamie Mcknight Add [K57.90] Diverticulosis     9/21/2023 12:04 PM Sharon Patel Add [K62.5] BRBPR (bright red blood per rectum)       ED Disposition     ED Disposition   Discharge    Condition   Stable    Date/Time   Thu Sep 21, 2023 [address] [place] [address] discharge to home/self care.                Follow-up Information     Follow up With Specialties Details Why Contact Info Additional [address] [facility],[address] Emergency Department Emergency Medicine Go to  If symptoms worsen [address] [id number]  [address] [facility] Emergency Department, [address] [place] [address] Dr, [address] [facility]    [address] [facility] Medicine Schedule an appointment as soon as possible for a visit   [address] [place] [address] [phone] XR [id number], [address], [place], [place], [id number] [phone]    [facility] [address] [facility] Gastroenterology Schedule an appointment as soon as possible for a visit   Rosanna Salcedo [address] [phone] [facility] Gastroenterology [facility] [address] [facility], [address] [place], [place], [address] [phone]          Discharge Medication List as of 9/21/2023 12:06 PM      CONTINUE these medications which have NOT CHANGED    Details   Alcohol Swabs ([employer] Alcohol Prep Pads) 70 % PADS USE ONE EVERY DAY, Historical Med      amLODIPine (NORVASC) 5 mg tablet Take 1 tablet (5 mg total) by mouth daily, Starting Mon 7/19/2021, Normal      Ascorbic Acid (vitamin C) 1000 MG tablet Take 1 tablet (1,000 mg total) by mouth 2 (two) times a day, Starting Mon 8/23/2021, Until Mon 9/27/2021, Normal      aspirin (ECOTRIN LOW STRENGTH) 81 mg EC tablet Take 1 tablet (81 mg total) by mouth daily, Starting Mon 7/19/2021, No Print      atorvastatin (LIPITOR) 40 mg tablet Take 1 tablet (40 mg total) by mouth daily with dinner, Starting Thu 5/27/2021, Until Mon 9/27/2021, Normal      B-D UF III MINI PEN NEEDLES 31G X 5 MM MISC USE ONE EVERY DAY, Historical Med      cholecalciferol (VITAMIN D3) 1,000 units tablet Take 2 tablets (2,000 Units total) by mouth daily, Starting Mon 8/23/2021, Until Mon 9/27/2021, Normal      fluticasone (FLONASE) 50 mcg/act nasal spray 2 sprays into each nostril daily, Starting Wed 3/1/2023, Normal      ibuprofen (MOTRIN) 600 mg tablet Take 1 tablet (600 mg total) by mouth every 6 (six) hours as needed for mild pain for up to 10 days, Starting Wed 3/1/2023, Until Sat 3/11/2023 at 2359, Normal      Lantus SoloStar 100 units/mL injection pen INJECT 10 UNIT UNDER THE SKIN ONCE A DAY, Historical Med      losartan (COZAAR) 50 mg tablet Take 1 tablet (50 mg total) by mouth daily, Starting Thu 5/27/2021, Until Mon 9/27/2021, Normal      metFORMIN (GLUCOPHAGE) 500 mg tablet Take 500 mg by mouth 2 (two) times a day, Starting Wed 9/15/2021, Historical Med      metoprolol tartrate (LOPRESSOR) 50 mg tablet Take 1 tablet the night before your cardiac CT, take 1 tablet the morning of your cardiac CT, and take the remainder of the tablets with you to the test, Normal      ofloxacin (FLOXIN) 0.3 % otic solution Administer 5 drops into ears 2 (two) times a day, Starting Wed 3/1/2023, Normal      pantoprazole (PROTONIX) 40 mg tablet Take 1 tablet (40 mg total) by mouth daily in the early morning, Starting Sun 4/18/2021, Until Mon 9/27/2021, Normal      predniSONE 20 mg tablet 2 PO x 2 days 1 PO x 2 days, Normal                 PDMP Review       Value Time User    PDMP Reviewed  Yes 8/25/2021  1:44 AM Jenni Aaron MD          ED Provider  Electronically Signed by           Laci Mariee [facility] [address]  09/21/23 [address] PA-C  09/21/23 6360

## 2023-09-21 NOTE — Clinical Note
Aarti Baker was seen and treated in our emergency department on 9/21/2023. Diagnosis:     Keya Mo  is off the rest of the shift today. He may return on this date:     Please excuse this individual on September 21, 2023. If you have any questions or concerns, please don't hesitate to call.       Kat Marino PA-C    ______________________________           _______________          _______________  Hospital Representative                              Date                                Time

## 2023-09-22 LAB
C DIFF TOX GENS STL QL NAA+PROBE: NEGATIVE
CAMPYLOBACTER DNA SPEC NAA+PROBE: NORMAL
SALMONELLA DNA SPEC QL NAA+PROBE: NORMAL
SHIGA TOXIN STX GENE SPEC NAA+PROBE: NORMAL
SHIGELLA DNA SPEC QL NAA+PROBE: NORMAL

## 2024-09-01 ENCOUNTER — HOSPITAL ENCOUNTER (EMERGENCY)
Facility: HOSPITAL | Age: 59
Discharge: HOME | End: 2024-09-01
Attending: EMERGENCY MEDICINE
Payer: COMMERCIAL

## 2024-09-01 VITALS
RESPIRATION RATE: 16 BRPM | OXYGEN SATURATION: 99 % | WEIGHT: 172.62 LBS | HEIGHT: 67 IN | TEMPERATURE: 98.3 F | SYSTOLIC BLOOD PRESSURE: 185 MMHG | BODY MASS INDEX: 27.09 KG/M2 | HEART RATE: 92 BPM | DIASTOLIC BLOOD PRESSURE: 98 MMHG

## 2024-09-01 DIAGNOSIS — S39.012A STRAIN OF LUMBAR REGION, INITIAL ENCOUNTER: ICD-10-CM

## 2024-09-01 DIAGNOSIS — Z87.828 HISTORY OF MOTOR VEHICLE ACCIDENT: ICD-10-CM

## 2024-09-01 DIAGNOSIS — S16.1XXA STRAIN OF NECK MUSCLE, INITIAL ENCOUNTER: ICD-10-CM

## 2024-09-01 PROCEDURE — 99284 EMERGENCY DEPT VISIT MOD MDM: CPT | Performed by: EMERGENCY MEDICINE

## 2024-09-01 PROCEDURE — 99283 EMERGENCY DEPT VISIT LOW MDM: CPT

## 2024-09-01 RX ORDER — METHOCARBAMOL 750 MG/1
750 TABLET, FILM COATED ORAL EVERY 8 HOURS PRN
Qty: 7 TABLET | Refills: 0 | Status: SHIPPED | OUTPATIENT
Start: 2024-09-02 | End: 2024-09-12

## 2024-09-01 RX ORDER — ACETAMINOPHEN 325 MG/1
975 TABLET ORAL ONCE
Status: COMPLETED | OUTPATIENT
Start: 2024-09-01 | End: 2024-09-01

## 2024-09-01 RX ORDER — METHOCARBAMOL 500 MG/1
500 TABLET, FILM COATED ORAL ONCE
Status: COMPLETED | OUTPATIENT
Start: 2024-09-01 | End: 2024-09-01

## 2024-09-01 RX ORDER — LIDOCAINE 50 MG/G
1 PATCH TOPICAL ONCE
Status: DISCONTINUED | OUTPATIENT
Start: 2024-09-01 | End: 2024-09-01 | Stop reason: HOSPADM

## 2024-09-01 RX ORDER — NAPROXEN 250 MG/1
500 TABLET ORAL ONCE
Status: COMPLETED | OUTPATIENT
Start: 2024-09-01 | End: 2024-09-01

## 2024-09-01 RX ADMIN — METHOCARBAMOL TABLETS 500 MG: 500 TABLET, COATED ORAL at 19:45

## 2024-09-01 RX ADMIN — ACETAMINOPHEN 975 MG: 325 TABLET, FILM COATED ORAL at 19:43

## 2024-09-01 RX ADMIN — LIDOCAINE 1 PATCH: 50 PATCH CUTANEOUS at 19:46

## 2024-09-01 RX ADMIN — NAPROXEN 500 MG: 250 TABLET ORAL at 19:44

## 2024-09-01 NOTE — DISCHARGE INSTRUCTIONS
Blood pressure was elevated in the emergency department. Follow up with your primary doctor/outpatient providers, and return to the emergency department for new or worsening symptoms.
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
## 2024-09-01 NOTE — Clinical Note
Asaf Moise was seen and treated in our emergency department on 9/1/2024.                Diagnosis:     Asaf  may return to work on return date.    He may return on this date: 09/05/2024         If you have any questions or concerns, please don't hesitate to call.      Je Cohen MD    ______________________________           _______________          _______________  Hospital Representative                              Date                                Time

## 2024-09-03 ENCOUNTER — TELEPHONE (OUTPATIENT)
Dept: PHYSICAL THERAPY | Facility: OTHER | Age: 59
End: 2024-09-03

## 2024-09-03 ENCOUNTER — NURSE TRIAGE (OUTPATIENT)
Dept: PHYSICAL THERAPY | Facility: OTHER | Age: 59
End: 2024-09-03

## 2024-09-03 DIAGNOSIS — M54.50 ACUTE RIGHT-SIDED LOW BACK PAIN WITHOUT SCIATICA: ICD-10-CM

## 2024-09-03 DIAGNOSIS — M54.2 ACUTE NECK PAIN: Primary | ICD-10-CM
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
## 2024-09-16 ENCOUNTER — HOSPITAL ENCOUNTER (OUTPATIENT)
Dept: RADIOLOGY | Facility: HOSPITAL | Age: 59
Discharge: HOME | End: 2024-09-16
Payer: COMMERCIAL

## 2024-09-16 ENCOUNTER — OFFICE VISIT (OUTPATIENT)
Age: 59
End: 2024-09-16
Payer: COMMERCIAL

## 2024-09-16 VITALS
HEIGHT: 67 IN | WEIGHT: 172 LBS | SYSTOLIC BLOOD PRESSURE: 156 MMHG | BODY MASS INDEX: 27 KG/M2 | DIASTOLIC BLOOD PRESSURE: 90 MMHG | HEART RATE: 88 BPM | OXYGEN SATURATION: 97 %

## 2024-09-16 DIAGNOSIS — M53.3 SACROILIAC JOINT DYSFUNCTION: ICD-10-CM

## 2024-09-16 DIAGNOSIS — M54.50 ACUTE RIGHT-SIDED LOW BACK PAIN WITHOUT SCIATICA: ICD-10-CM

## 2024-09-16 DIAGNOSIS — M99.07 SOMATIC DYSFUNCTION OF RIGHT UPPER EXTREMITY: ICD-10-CM

## 2024-09-16 DIAGNOSIS — M54.2 ACUTE NECK PAIN: ICD-10-CM

## 2024-09-16 DIAGNOSIS — M79.18 MYOFASCIAL PAIN ON RIGHT SIDE: ICD-10-CM

## 2024-09-16 DIAGNOSIS — M99.03 SEGMENTAL DYSFUNCTION OF LUMBAR REGION: ICD-10-CM

## 2024-09-16 DIAGNOSIS — M99.01 SEGMENTAL DYSFUNCTION OF CERVICAL REGION: ICD-10-CM

## 2024-09-16 DIAGNOSIS — M25.811 IMPINGEMENT OF RIGHT SHOULDER: ICD-10-CM

## 2024-09-16 DIAGNOSIS — M99.02 SEGMENTAL DYSFUNCTION OF THORACIC REGION: ICD-10-CM

## 2024-09-16 DIAGNOSIS — M79.18 MYOFASCIAL PAIN ON RIGHT SIDE: Primary | ICD-10-CM

## 2024-09-16 PROCEDURE — 99203 OFFICE O/P NEW LOW 30 MIN: CPT | Performed by: CHIROPRACTOR

## 2024-09-16 PROCEDURE — 98943 CHIROPRACT MANJ XTRSPINL 1/>: CPT | Performed by: CHIROPRACTOR

## 2024-09-16 PROCEDURE — 98941 CHIROPRACT MANJ 3-4 REGIONS: CPT | Performed by: CHIROPRACTOR

## 2024-09-16 PROCEDURE — 72100 X-RAY EXAM L-S SPINE 2/3 VWS: CPT
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
## 2024-09-16 NOTE — PATIENT INSTRUCTIONS
The patient was advised to apply ice to the region in 15-minute intervals a minimum of 3 times per day.   The patient was advised to avoid sleeping in the prone and right side lying positions. Proper sleeping postures and use of pillows were discussed.  The patient was advised to avoid prolonged sitting and take micro breaks every 30 minutes.  The patient was informed that he may experience additional soreness following the today's treatment visit. The need to continue with the stretching exercises and apply ice in 15-minute intervals was discussed with the patient. The patient was informed they may see redness or possible bruising in the region of Graston technique treatment.

## 2024-09-19 ENCOUNTER — PROCEDURE VISIT (OUTPATIENT)
Age: 59
End: 2024-09-19
Payer: COMMERCIAL

## 2024-09-19 VITALS
DIASTOLIC BLOOD PRESSURE: 78 MMHG | HEIGHT: 67 IN | HEART RATE: 88 BPM | OXYGEN SATURATION: 99 % | WEIGHT: 172 LBS | SYSTOLIC BLOOD PRESSURE: 152 MMHG | BODY MASS INDEX: 27 KG/M2

## 2024-09-19 DIAGNOSIS — M25.811 IMPINGEMENT OF RIGHT SHOULDER: ICD-10-CM

## 2024-09-19 DIAGNOSIS — M99.02 SEGMENTAL DYSFUNCTION OF THORACIC REGION: ICD-10-CM

## 2024-09-19 DIAGNOSIS — M53.3 SACROILIAC JOINT DYSFUNCTION: ICD-10-CM

## 2024-09-19 DIAGNOSIS — M99.01 SEGMENTAL DYSFUNCTION OF CERVICAL REGION: ICD-10-CM

## 2024-09-19 DIAGNOSIS — M99.03 SEGMENTAL DYSFUNCTION OF LUMBAR REGION: ICD-10-CM

## 2024-09-19 DIAGNOSIS — M79.18 MYOFASCIAL PAIN ON RIGHT SIDE: Primary | ICD-10-CM

## 2024-09-19 DIAGNOSIS — M99.07 SOMATIC DYSFUNCTION OF RIGHT UPPER EXTREMITY: ICD-10-CM

## 2024-09-19 PROCEDURE — 98943 CHIROPRACT MANJ XTRSPINL 1/>: CPT | Performed by: CHIROPRACTOR

## 2024-09-19 PROCEDURE — 98941 CHIROPRACT MANJ 3-4 REGIONS: CPT | Performed by: CHIROPRACTOR

## 2024-09-19 NOTE — PROGRESS NOTES
Assessment:  The patient is progressing as expected.    1. Myofascial pain on right side        2. Sacroiliac joint dysfunction        3. Segmental dysfunction of lumbar region        4. Segmental dysfunction of cervical region        5. Segmental dysfunction of thoracic region        6. Somatic dysfunction of right upper extremity        7. Impingement of right shoulder          Plan:  Treatment today consisted of myofascial release via Graston Technique to the right upper thoracic musculature including the levator scapula at the superior angle of the scapula, right supraspinatus.  GT 3, GT 4, and GT 5 were used.  Myofascial release via ischemic compression was performed to the right lumbar erector spinae and right quadratus lumborum.  Myofascial release via active release technique was performed to the right subscapularis and lateral portion of the right pectoralis major (patient performed active movements).    Manipulation was performed to the right sacroiliac joint via Samaniego low force drop technique. Contact was made to the right ischial tuberosity and the left lateral aspect of the  apex of the sacrum. Manipulation was performed to the lumbar restrictions via manual lumbar flexion distraction technique. Manipulation was performed to the lower cervical restrictions via gentle cervical traction technique.    Manipulation was performed to the right glenohumeral joint via grade 1 mobilization techniques.    Subjective:  The patient reported that he is not experiencing the headaches since initial chiropractic visit.  However he stated he still has pain with abduction of the right shoulder.  He indicated having symptoms in the right posterior cervical region, right upper thoracic region, and right lateral shoulder region.  He has increased pain in his right cervical/upper thoracic region and right lateral shoulder region with abduction of his right upper extremity.  The patient has decreased right cervical/upper  thoracic symptoms with rest.  Despite reporting feeling better the patient described his cervical pain as a 6 on a 0-10 pain scale which is higher than the initial chiropractic visit.  He described his right shoulder pain as a a 3 or 4 on a 0-10 pain scale but an 8 on a 0-10 pain scale with abduction of the right upper extremity.  The patient has pain in the right ASIS region to the midline lumbar/right lower back.  He has increased symptoms in the right lower back to the ASIS region with sitting for varying periods of time.  He has increased symptoms in the right lower back to the ASIS region with activity.  The patient reported having decreased right lower back/ASIS region pain with taking the muscle relaxant medication.  He described his lower back to the right ASIS region as a 7 on a 0-10 pain scale.      Objective:  Negative Derefield on the right. Active myofascial trigger points were present in the right lumbar erector spinae and quadratus lumborum.  Active myofascial trigger points were present in the right superior trapezius, levator scapulae, bilateral semispinalis capitis, bilateral splenius capitis, and bilateral splenius cervicis. Intersegmental motion was decreased in the cervical spine including joint dysfunctions at C1-C2, C5-C6, and C6-C7. Intersegmental motion was decreased in the thoracic spine including joint dysfunctions at T3-4, T6-7, T7-8, and T10-11. Intersegmental motion was decreased in the lumbar spine including joint dysfunctions at L2-3 and L5-S1. Intersegmental motion was decreased in the right sacroiliac joint.  Range of motion with active release technique maneuvers to the right subscapularis were 20 to 25% restricted.  Limitation in range of motion is also noted with active release technique maneuvers to the right pectoralis major.       I have used the Epic copy/forward function to compose this note.  I have reviewed my current note to ensure it reflects the current patient status,  exam, assessment and plan.    Dictation voice to text software has been used in the creation of this document. Please consider this in light of any contextual or grammatical errors.

## 2024-09-23 ENCOUNTER — PROCEDURE VISIT (OUTPATIENT)
Age: 59
End: 2024-09-23
Payer: COMMERCIAL

## 2024-09-23 VITALS
WEIGHT: 172 LBS | HEART RATE: 75 BPM | OXYGEN SATURATION: 99 % | HEIGHT: 67 IN | DIASTOLIC BLOOD PRESSURE: 80 MMHG | SYSTOLIC BLOOD PRESSURE: 152 MMHG | BODY MASS INDEX: 27 KG/M2

## 2024-09-23 DIAGNOSIS — M99.01 SEGMENTAL DYSFUNCTION OF CERVICAL REGION: ICD-10-CM

## 2024-09-23 DIAGNOSIS — M53.3 SACROILIAC JOINT DYSFUNCTION: ICD-10-CM

## 2024-09-23 DIAGNOSIS — M99.02 SEGMENTAL DYSFUNCTION OF THORACIC REGION: ICD-10-CM

## 2024-09-23 DIAGNOSIS — M25.811 IMPINGEMENT OF RIGHT SHOULDER: ICD-10-CM

## 2024-09-23 DIAGNOSIS — M79.18 MYOFASCIAL PAIN ON RIGHT SIDE: Primary | ICD-10-CM

## 2024-09-23 DIAGNOSIS — M99.07 SOMATIC DYSFUNCTION OF RIGHT UPPER EXTREMITY: ICD-10-CM

## 2024-09-23 DIAGNOSIS — M99.03 SEGMENTAL DYSFUNCTION OF LUMBAR REGION: ICD-10-CM

## 2024-09-23 PROCEDURE — 98941 CHIROPRACT MANJ 3-4 REGIONS: CPT | Performed by: CHIROPRACTOR

## 2024-09-23 PROCEDURE — 98943 CHIROPRACT MANJ XTRSPINL 1/>: CPT | Performed by: CHIROPRACTOR

## 2024-09-24 NOTE — PROGRESS NOTES
Assessment:  The patient is progressing as expected.    1. Myofascial pain on right side        2. Sacroiliac joint dysfunction        3. Segmental dysfunction of lumbar region        4. Segmental dysfunction of cervical region        5. Segmental dysfunction of thoracic region        6. Somatic dysfunction of right upper extremity        7. Impingement of right shoulder          Plan:  Treatment today consisted of myofascial release via Graston Technique to the right upper thoracic musculature including the levator scapula at the superior angle of the scapula, right supraspinatus.  GT 3, GT 4, and GT 5 were used.  Myofascial release via ischemic compression was performed to the right lumbar erector spinae and right quadratus lumborum.  Myofascial release via active release technique was performed to the right subscapularis and lateral portion of the right pectoralis major (patient performed active movements).    Manipulation was performed to the right sacroiliac joint via Samaniego low force drop technique. Contact was made to the right ischial tuberosity and the left lateral aspect of the  apex of the sacrum. Manipulation was performed to the lumbar restrictions via manual lumbar flexion distraction technique. Manipulation was performed to the lower cervical restrictions via gentle cervical traction technique.    Manipulation was performed to the right glenohumeral joint via grade 1 mobilization techniques.    Subjective:  The patient reported that he feels a definite improvement in his right cervical/upper thoracic region since the last chiropractic visit.  He stated he has pain with abduction of the right shoulder.  He indicated having symptoms in the right posterior cervical region, right upper thoracic region, and right lateral shoulder region. The patient has decreased right cervical/upper thoracic symptoms with rest.  The patient described his cervical pain as a 3 on a 0-10 pain scale.  He described his right  shoulder pain as a a 6 on a 0-10 pain scale with abduction of the right upper extremity.  The patient has decreased pain in the right ASIS region to the midline lumbar/right lower back.  He has increased symptoms in the right lower back to the ASIS region with sitting for varying periods of time.  He has increased symptoms in the right lower back to the ASIS region with activity.  The patient reported having decreased right lower back/ASIS region pain with taking the muscle relaxant medication.  He described his lower back to the right ASIS region as a 4 on a 0-10 pain scale.      Objective:  Negative Derefield on the right. Active myofascial trigger points were present in the right lumbar erector spinae and quadratus lumborum.  Active myofascial trigger points were present in the right superior trapezius, levator scapulae, bilateral semispinalis capitis, bilateral splenius capitis, and bilateral splenius cervicis. Intersegmental motion was decreased in the cervical spine including joint dysfunctions at C1-C2, C5-C6, and C6-C7. Intersegmental motion was decreased in the thoracic spine including joint dysfunctions at T3-4, T7-8, and T10-11. Intersegmental motion was decreased in the lumbar spine including joint dysfunctions at L2-3 and L5-S1. Intersegmental motion was decreased in the right sacroiliac joint.  Range of motion with active release technique maneuvers to the right subscapularis were better when compared to last chiropractic visit.  Limitation in range of motion is also noted with active release technique maneuvers to the right pectoralis major.  The right glenohumeral joint was restricted from internal and external rotation.       I have used the Epic copy/forward function to compose this note.  I have reviewed my current note to ensure it reflects the current patient status, exam, assessment and plan.    Dictation voice to text software has been used in the creation of this document. Please consider  this in light of any contextual or grammatical errors.

## 2024-09-26 ENCOUNTER — PROCEDURE VISIT (OUTPATIENT)
Age: 59
End: 2024-09-26
Payer: COMMERCIAL

## 2024-09-26 VITALS
OXYGEN SATURATION: 97 % | DIASTOLIC BLOOD PRESSURE: 84 MMHG | HEART RATE: 93 BPM | HEIGHT: 67 IN | WEIGHT: 172 LBS | BODY MASS INDEX: 27 KG/M2 | SYSTOLIC BLOOD PRESSURE: 156 MMHG

## 2024-09-26 DIAGNOSIS — M99.01 SEGMENTAL DYSFUNCTION OF CERVICAL REGION: ICD-10-CM

## 2024-09-26 DIAGNOSIS — M79.18 MYOFASCIAL PAIN ON RIGHT SIDE: Primary | ICD-10-CM

## 2024-09-26 DIAGNOSIS — M99.07 SOMATIC DYSFUNCTION OF RIGHT UPPER EXTREMITY: ICD-10-CM

## 2024-09-26 DIAGNOSIS — M99.03 SEGMENTAL DYSFUNCTION OF LUMBAR REGION: ICD-10-CM

## 2024-09-26 DIAGNOSIS — M53.3 SACROILIAC JOINT DYSFUNCTION: ICD-10-CM

## 2024-09-26 DIAGNOSIS — M25.811 IMPINGEMENT OF RIGHT SHOULDER: ICD-10-CM

## 2024-09-26 DIAGNOSIS — M99.02 SEGMENTAL DYSFUNCTION OF THORACIC REGION: ICD-10-CM

## 2024-09-26 PROCEDURE — 98943 CHIROPRACT MANJ XTRSPINL 1/>: CPT | Performed by: CHIROPRACTOR

## 2024-09-26 PROCEDURE — 98941 CHIROPRACT MANJ 3-4 REGIONS: CPT | Performed by: CHIROPRACTOR

## 2024-09-26 NOTE — PROGRESS NOTES
Assessment:  The patient is indicating that he continues to experience the same intensity of symptoms in his right shoulder with abduction to 90 degrees of the right upper extremity.  Therefore he was referred to Dr. Harry Lu (orthopedic) for evaluation of the right shoulder.  The patient was taught pectoralis/subscapularis and gluteus medius/piriformis stretches for home use. The need to stretch to the point of tension only and not to the point of pain was emphasized. The patient was advised to do the stretch at 3 sets of 15 to 20 seconds several times per day.      1. Myofascial pain on right side        2. Sacroiliac joint dysfunction        3. Segmental dysfunction of lumbar region        4. Segmental dysfunction of cervical region        5. Segmental dysfunction of thoracic region        6. Somatic dysfunction of right upper extremity        7. Impingement of right shoulder          Plan:  Treatment today consisted of myofascial release via Graston Technique to the right upper thoracic musculature including the levator scapula at the superior angle of the scapula, right supraspinatus, right anterior/lateral gluteus medius, and tensor fascia nabila (superficial to the right gluteus medius and tensor fascia nabila).  GT 3, GT 4, and GT 5 were used.  Myofascial release via ischemic compression was performed to the right lumbar erector spinae and right quadratus lumborum.  Myofascial release via active release technique was performed to the right subscapularis and lateral portion of the right pectoralis major (patient performed active movements).    Manipulation was performed to the right sacroiliac joint via Samaniego low force drop technique. Contact was made to the right ischial tuberosity and the left lateral aspect of the  apex of the sacrum. Manipulation was performed to the lumbar restrictions via manual lumbar flexion distraction technique. Manipulation was performed to the lower cervical restrictions via  gentle cervical traction technique.    Manipulation was performed to the right glenohumeral joint via grade 1 mobilization techniques.    Subjective:  The patient reported that he feels the same in the right cervical/upper thoracic region and right superior lateral shoulder region as the last chiropractic visit.  Once again he stated he has right superior lateral shoulder pain with abduction of the right shoulder.  The patient stated he feels as though he has some improvement in his right shoulder range of motion but continues to experience right superior lateral shoulder pain at 90 degrees of abduction of the right upper extremity.  He indicated having symptoms in the right posterior cervical region, right upper thoracic region, and right lateral shoulder region. The patient has decreased right cervical/upper thoracic symptoms with rest.  Although the patient reported feeling the same as the last chiropractic visit the patient described his cervical pain as a 6 or 7 on a 0-10 pain scale.  He described his right shoulder pain as a a 6 on a 0-10 pain scale with abduction of the right upper extremity.  The patient has the same intensity of pain in the midline lumbar/right lower back.  He indicated the pain is into the right superior lateral gluteal region.  He has increased symptoms in the right lower back to the right superior lateral gluteal region region with sitting for varying periods of time.  He has increased symptoms in the right lower back and superior lateral gluteal region region with activity.  The patient reported having decreased right lower back/right superior lateral gluteal region pain with taking the muscle relaxant medication.  He described his lower back to the right superior lateral gluteal region 5 on a 0-10 pain scale.      Objective:  Negative Derefield on the right. Active myofascial trigger points were present in the right lumbar erector spinae and quadratus lumborum.  Active myofascial  trigger points were present in the right superior trapezius, levator scapulae, bilateral splenius capitis, and bilateral splenius cervicis.  Trigger point activity and hypertonicity are at least 60% less in the right cervical/upper thoracic region overall.  Intersegmental motion was decreased in the cervical spine including joint dysfunctions at C1-C2, C5-C6, and C6-C7. Intersegmental motion was decreased in the thoracic spine including joint dysfunctions at T3-4, T7-8, and T10-11. Intersegmental motion was decreased in the lumbar spine including joint dysfunctions at L2-3 and L5-S1. Intersegmental motion was decreased in the right sacroiliac joint.  Range of motion with active release technique maneuvers to the right pectoralis major are better when compared to last chiropractic visit.  The right glenohumeral joint was restricted from internal and external rotation.       I have used the Epic copy/forward function to compose this note.  I have reviewed my current note to ensure it reflects the current patient status, exam, assessment and plan.    Dictation voice to text software has been used in the creation of this document. Please consider this in light of any contextual or grammatical errors.

## 2024-09-26 NOTE — PATIENT INSTRUCTIONS
The patient was advised to continue with the icing and stretching instructions.   The patient was informed that he may experience additional soreness following the today's treatment visit. The need to continue with the stretching exercises and apply ice in 15-minute intervals was discussed with the patient.   The patient was taught pectoralis/subscapularis and gluteus medius/piriformis stretches for home use. The need to stretch to the point of tension only and not to the point of pain was emphasized. The patient was advised to do the stretch at 3 sets of 15 to 20 seconds several times per day.

## 2024-10-01 ENCOUNTER — PROCEDURE VISIT (OUTPATIENT)
Age: 59
End: 2024-10-01
Payer: COMMERCIAL

## 2024-10-01 VITALS
WEIGHT: 172 LBS | SYSTOLIC BLOOD PRESSURE: 156 MMHG | BODY MASS INDEX: 27 KG/M2 | HEIGHT: 67 IN | OXYGEN SATURATION: 98 % | HEART RATE: 73 BPM | DIASTOLIC BLOOD PRESSURE: 82 MMHG

## 2024-10-01 DIAGNOSIS — M25.811 IMPINGEMENT OF RIGHT SHOULDER: ICD-10-CM

## 2024-10-01 DIAGNOSIS — M99.01 SEGMENTAL DYSFUNCTION OF CERVICAL REGION: ICD-10-CM

## 2024-10-01 DIAGNOSIS — M53.3 SACROILIAC JOINT DYSFUNCTION: ICD-10-CM

## 2024-10-01 DIAGNOSIS — M79.18 MYOFASCIAL PAIN ON RIGHT SIDE: Primary | ICD-10-CM

## 2024-10-01 DIAGNOSIS — M99.03 SEGMENTAL DYSFUNCTION OF LUMBAR REGION: ICD-10-CM

## 2024-10-01 DIAGNOSIS — M99.02 SEGMENTAL DYSFUNCTION OF THORACIC REGION: ICD-10-CM

## 2024-10-01 DIAGNOSIS — M99.07 SOMATIC DYSFUNCTION OF RIGHT UPPER EXTREMITY: ICD-10-CM

## 2024-10-01 PROCEDURE — 98941 CHIROPRACT MANJ 3-4 REGIONS: CPT | Performed by: CHIROPRACTOR

## 2024-10-01 PROCEDURE — 98943 CHIROPRACT MANJ XTRSPINL 1/>: CPT | Performed by: CHIROPRACTOR

## 2024-10-01 NOTE — PROGRESS NOTES
"Assessment:  I began addressing the right coracoid clavicular ligament and right acromioclavicular joint as the patient is describing a locking sensation in the right superior lateral shoulder region with abduction beyond 90 degrees.    1. Myofascial pain on right side        2. Sacroiliac joint dysfunction        3. Segmental dysfunction of lumbar region        4. Segmental dysfunction of cervical region        5. Segmental dysfunction of thoracic region        6. Somatic dysfunction of right upper extremity        7. Impingement of right shoulder          Plan:  Treatment today consisted of myofascial release via Graston Technique to the right upper thoracic musculature including the levator scapula at the superior angle of the scapula, right supraspinatus, right anterior/lateral gluteus medius, and tensor fascia nabila.  GT 3, GT 4, and GT 5 were used.  Myofascial release via ischemic compression was performed to the right lumbar erector spinae and right quadratus lumborum.  Myofascial release via active release technique was performed to the right coracoid clavicular ligament, right subscapularis, and lateral portion of the right pectoralis major (patient performed active movements).    Manipulation was performed to the right sacroiliac joint via Samaniego low force drop technique. Contact was made to the right ischial tuberosity and the left lateral aspect of the  apex of the sacrum. Manipulation was performed to the lumbar restrictions via manual lumbar flexion distraction technique. Manipulation was performed to the lower cervical restrictions via gentle cervical traction technique.    Manipulation was performed to the right glenohumeral joint via grade 1 mobilization techniques.  Manipulation was performed to the right acromioclavicular joint and an A to P direction.    Subjective:  The patient reported that he feels \"much better\" in the right cervical/upper thoracic region but continues to experience with " walking in the right superior lateral shoulder region with abduction of the shoulder to 90 degrees.  He stated that this occurred on Saturday.  The patient informed my assistant that he feels as though he has improvement in his right shoulder range of motion but continues to experience right superior lateral shoulder pain.  He indicated having symptoms in the right posterior cervical region, right upper thoracic region, and right lateral shoulder region. The patient has decreased right cervical/upper thoracic symptoms with rest. The patient described his cervical pain as a 2 on a 0-10 pain scale.  He described his right shoulder pain as a a 5 on a 0-10 pain scale with abduction of the right upper extremity.  The patient feels much better in the midline lumbar/right lower back.  He i stated he noticed decreased pain is into the right superior lateral gluteal region with Graston Technique to the region.  He has increased symptoms in the right lower back to the right superior lateral gluteal region region with sitting for varying periods of time.  He has increased symptoms in the right lower back and superior lateral gluteal region region with activity.  The patient reported having decreased right lower back/right superior lateral gluteal region pain with taking the muscle relaxant medication.  He described his lower back to the right superior lateral gluteal region 2 on a 0-10 pain scale.      Objective:  Negative Derefield on the right. Active myofascial trigger points were present in the right lumbar erector spinae and quadratus lumborum.  Active myofascial trigger points were present in the right superior trapezius, levator scapulae, bilateral splenius capitis, and bilateral splenius cervicis.  Hypertonicity is present on the right coracoid clavicular ligament.  Intersegmental motion was decreased in the cervical spine including joint dysfunctions at C1-C2, C5-C6, and C6-C7. Intersegmental motion was decreased in  the thoracic spine including joint dysfunctions at T3-4, T6-7 T7-8, and T10-11. Intersegmental motion was decreased in the lumbar spine including joint dysfunctions at L4-5 and L5-S1. Intersegmental motion was decreased in the right sacroiliac joint. The right glenohumeral joint was restricted from internal and external rotation.  The right acromioclavicular joint was restricted from an A to P direction.       I have used the Epic copy/forward function to compose this note.  I have reviewed my current note to ensure it reflects the current patient status, exam, assessment and plan.    Dictation voice to text software has been used in the creation of this document. Please consider this in light of any contextual or grammatical errors.

## 2024-10-03 ENCOUNTER — PROCEDURE VISIT (OUTPATIENT)
Age: 59
End: 2024-10-03
Payer: COMMERCIAL

## 2024-10-03 VITALS
HEART RATE: 91 BPM | OXYGEN SATURATION: 96 % | BODY MASS INDEX: 27 KG/M2 | HEIGHT: 67 IN | SYSTOLIC BLOOD PRESSURE: 162 MMHG | DIASTOLIC BLOOD PRESSURE: 80 MMHG | WEIGHT: 172 LBS

## 2024-10-03 DIAGNOSIS — M53.3 SACROILIAC JOINT DYSFUNCTION: ICD-10-CM

## 2024-10-03 DIAGNOSIS — M99.07 SOMATIC DYSFUNCTION OF RIGHT UPPER EXTREMITY: ICD-10-CM

## 2024-10-03 DIAGNOSIS — M99.03 SEGMENTAL DYSFUNCTION OF LUMBAR REGION: ICD-10-CM

## 2024-10-03 DIAGNOSIS — M99.01 SEGMENTAL DYSFUNCTION OF CERVICAL REGION: ICD-10-CM

## 2024-10-03 DIAGNOSIS — M25.811 IMPINGEMENT OF RIGHT SHOULDER: ICD-10-CM

## 2024-10-03 DIAGNOSIS — M79.18 MYOFASCIAL PAIN ON RIGHT SIDE: Primary | ICD-10-CM

## 2024-10-03 DIAGNOSIS — M99.02 SEGMENTAL DYSFUNCTION OF THORACIC REGION: ICD-10-CM

## 2024-10-03 PROCEDURE — 98943 CHIROPRACT MANJ XTRSPINL 1/>: CPT | Performed by: CHIROPRACTOR

## 2024-10-03 PROCEDURE — 98941 CHIROPRACT MANJ 3-4 REGIONS: CPT | Performed by: CHIROPRACTOR

## 2024-10-03 NOTE — PROGRESS NOTES
"Assessment:    1. Myofascial pain on right side        2. Sacroiliac joint dysfunction        3. Segmental dysfunction of lumbar region        4. Segmental dysfunction of cervical region        5. Segmental dysfunction of thoracic region        6. Somatic dysfunction of right upper extremity        7. Impingement of right shoulder          Plan:  Treatment today consisted of myofascial release via active technique to the upper thoracic portion of the right superior trapezius, right levator scapula at the superior angle of the scapula, right supraspinatus, right gluteus medius,  right coracoid clavicular ligament, right subscapularis, and lateral portion of the right pectoralis major (patient performed active movements). Myofascial release via ischemic compression was performed to the right lumbar erector spinae and right quadratus lumborum.     Manipulation was performed to the right sacroiliac joint via Samaniego low force drop technique. Contact was made to the right ischial tuberosity and the left lateral aspect of the  apex of the sacrum. Manipulation was performed to the lumbar restrictions via manual lumbar flexion distraction technique. Manipulation was performed to the lower cervical restrictions via gentle cervical traction technique.    Manipulation was performed to the right glenohumeral joint via grade 1 mobilization techniques.  Manipulation was performed to the right acromioclavicular joint and an A to P direction.    Subjective:  The patient reported that he feels \"much better\" in the right cervical/upper thoracic region but continues to experience with locking in the right superior lateral shoulder region with abduction of the shoulder to 90 degrees. He has pain/symptoms in the right posterior cervical region, right upper thoracic region, and right lateral shoulder region. The patient has decreased right cervical/upper thoracic symptoms with rest.  Despite reporting feeling much better the patient " described his cervical pain as a 4 on a 0-10 pain scale.  He described his right shoulder pain as a a 7 on a 0-10 pain scale with abduction of the right upper extremity.  He described his right superior lateral shoulder pain as a 2 on a 0-10 pain scale when it is in the neutral position.  The patient feels much better in the midline lumbar/right lower back.  He indicated having symptoms in the right lower back/sacroiliac region.  He has increased symptoms in the right lower back to the right superior lateral gluteal region region with sitting for varying periods of time.  He has increased symptoms in the right lower back and superior lateral gluteal region region with activity.  The patient reported having decreased right lower back/right superior lateral gluteal region pain with taking the muscle relaxant medication.  He described his lower back to the right superior lateral gluteal region 0 on a 0-10 pain scale.      Objective:  Negative Derefield on the right. Active myofascial trigger points were present in the right lumbar erector spinae and quadratus lumborum.  Active myofascial trigger points were present in the right superior trapezius, levator scapulae, bilateral splenius capitis, and bilateral splenius cervicis.  Hypertonicity is at least 60% less in the right upper thoracic/cervical region when compared to earlier chiropractic visits.  Hypertonicity is present on the right coracoid clavicular ligament.  Intersegmental motion was decreased in the cervical spine including joint dysfunctions at C1-C2 and C5-C6. Intersegmental motion was decreased in the thoracic spine including joint dysfunctions at T3-4, T6-7 T7-8, and T10-11. Intersegmental motion was decreased in the lumbar spine including joint dysfunctions at L2-3 and L5-S1. Intersegmental motion was decreased in the right sacroiliac joint. The right glenohumeral joint was restricted from internal and external rotation.  The right acromioclavicular  joint was restricted from an A to P direction.       I have used the Epic copy/forward function to compose this note.  I have reviewed my current note to ensure it reflects the current patient status, exam, assessment and plan.    Dictation voice to text software has been used in the creation of this document. Please consider this in light of any contextual or grammatical errors.

## 2024-10-10 ENCOUNTER — TELEPHONE (OUTPATIENT)
Age: 59
End: 2024-10-10

## 2024-10-10 ENCOUNTER — APPOINTMENT (OUTPATIENT)
Dept: RADIOLOGY | Facility: AMBULARY SURGERY CENTER | Age: 59
End: 2024-10-10
Attending: ORTHOPAEDIC SURGERY
Payer: COMMERCIAL

## 2024-10-10 ENCOUNTER — OFFICE VISIT (OUTPATIENT)
Dept: OBGYN CLINIC | Facility: CLINIC | Age: 59
End: 2024-10-10
Payer: COMMERCIAL

## 2024-10-10 DIAGNOSIS — M25.511 RIGHT SHOULDER PAIN, UNSPECIFIED CHRONICITY: Primary | ICD-10-CM

## 2024-10-10 DIAGNOSIS — M25.811 IMPINGEMENT OF RIGHT SHOULDER: ICD-10-CM

## 2024-10-10 DIAGNOSIS — M25.511 RIGHT SHOULDER PAIN, UNSPECIFIED CHRONICITY: ICD-10-CM

## 2024-10-10 DIAGNOSIS — M19.011 PRIMARY OSTEOARTHRITIS OF RIGHT SHOULDER: ICD-10-CM

## 2024-10-10 PROCEDURE — 99214 OFFICE O/P EST MOD 30 MIN: CPT | Performed by: ORTHOPAEDIC SURGERY

## 2024-10-10 PROCEDURE — 73030 X-RAY EXAM OF SHOULDER: CPT

## 2024-10-10 PROCEDURE — 20610 DRAIN/INJ JOINT/BURSA W/O US: CPT | Performed by: ORTHOPAEDIC SURGERY

## 2024-10-10 RX ORDER — BUPIVACAINE HYDROCHLORIDE 2.5 MG/ML
1.5 INJECTION, SOLUTION INFILTRATION; PERINEURAL
Status: COMPLETED | OUTPATIENT
Start: 2024-10-10 | End: 2024-10-10

## 2024-10-10 RX ORDER — BETAMETHASONE SODIUM PHOSPHATE AND BETAMETHASONE ACETATE 3; 3 MG/ML; MG/ML
9 INJECTION, SUSPENSION INTRA-ARTICULAR; INTRALESIONAL; INTRAMUSCULAR; SOFT TISSUE
Status: COMPLETED | OUTPATIENT
Start: 2024-10-10 | End: 2024-10-10

## 2024-10-10 RX ADMIN — BETAMETHASONE SODIUM PHOSPHATE AND BETAMETHASONE ACETATE 9 MG: 3; 3 INJECTION, SUSPENSION INTRA-ARTICULAR; INTRALESIONAL; INTRAMUSCULAR; SOFT TISSUE at 13:15

## 2024-10-10 RX ADMIN — BUPIVACAINE HYDROCHLORIDE 1.5 ML: 2.5 INJECTION, SOLUTION INFILTRATION; PERINEURAL at 13:15

## 2024-10-10 NOTE — TELEPHONE ENCOUNTER
Left message to check if would like to schedule any additional chiropractic appointments.  Call back if would like to schedule.
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
## 2024-11-21 ENCOUNTER — HOSPITAL ENCOUNTER (EMERGENCY)
Facility: HOSPITAL | Age: 59
Discharge: HOME | End: 2024-11-21
Attending: EMERGENCY MEDICINE
Payer: COMMERCIAL

## 2024-11-21 VITALS
SYSTOLIC BLOOD PRESSURE: 184 MMHG | TEMPERATURE: 98.5 F | BODY MASS INDEX: 27.41 KG/M2 | DIASTOLIC BLOOD PRESSURE: 100 MMHG | RESPIRATION RATE: 12 BRPM | WEIGHT: 175 LBS | OXYGEN SATURATION: 99 % | HEART RATE: 76 BPM

## 2024-11-21 DIAGNOSIS — R21 RASH AND NONSPECIFIC SKIN ERUPTION: Primary | ICD-10-CM

## 2024-11-21 DIAGNOSIS — B36.9 FUNGAL OTITIS EXTERNA: ICD-10-CM

## 2024-11-21 DIAGNOSIS — H62.40 FUNGAL OTITIS EXTERNA: ICD-10-CM

## 2024-11-21 PROCEDURE — 99283 EMERGENCY DEPT VISIT LOW MDM: CPT

## 2024-11-21 PROCEDURE — 99284 EMERGENCY DEPT VISIT MOD MDM: CPT | Performed by: EMERGENCY MEDICINE

## 2024-11-21 RX ORDER — HYDROCORTISONE AND ACETIC ACID 20.75; 10.375 MG/ML; MG/ML
3 SOLUTION AURICULAR (OTIC) 4 TIMES DAILY
Qty: 10 ML | Refills: 0 | Status: SHIPPED | OUTPATIENT
Start: 2024-11-21

## 2024-11-21 RX ORDER — CLOTRIMAZOLE 1 %
CREAM (GRAM) TOPICAL
Qty: 15 G | Refills: 0 | Status: SHIPPED | OUTPATIENT
Start: 2024-11-21

## 2024-11-21 NOTE — DISCHARGE INSTRUCTIONS
I have prescribed a trial of a topical antifungal called clotrimazole.  If this does not improve your symptoms after approximately 2 weeks of use you should make a follow-up appointment with dermatology.  Regarding your ear discharge I recommend following up with your ENT.  You can continue using the drops I previously prescribed but insurance would likely not cover them if I tried to represcribe them.  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  You should have repeat testing since you are still having symptoms intermittently.

## 2024-11-22 NOTE — ED PROVIDER NOTES
Time reflects when diagnosis was documented in both MDM as applicable and the Disposition within this note       Time User Action Codes Description Comment    11/21/2024  4:14 PM Suma Jara [R21] Rash and nonspecific skin eruption     11/21/2024  4:26 PM Suma Jara [B36.9,  H62.40] Fungal otitis externa           ED Disposition       ED Disposition   Discharge    Condition   Stable    Date/Time   Thu Nov 21, 2024  4:14 PM    Comment   Asaf Moise discharge to home/self care.                   Assessment & Plan       Medical Decision Making  60 yo M w several months rash to b/l tibial tuberosities.  Clinical appearance nonspecific but most likely dermatophyte infection.  Will prescribe trial of clotrimazole.  Advised dermatology follow-up if symptoms do not improve.  Patient also asking about eardrops.  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  Being treated with [facility] HC eardrops.  Advised patient that he needs to follow-up with ENT since it has been such a long time however I will attempt to fill his eardrops although I am unsure if insurance will cover this.  Current appearance is not consistent with acute bacterial otitis externa and is relatively mild.    Risk  Prescription drug management.             Medications - No data to display    ED Risk Strat Scores                           SBIRT 20yo+      Flowsheet Row Most Recent Value   Initial Alcohol Screen: US AUDIT-C     1. How often do you have a drink containing alcohol? 0 Filed at: 11/21/2024 1515   2. How many drinks containing alcohol do you have on a typical day you are drinking?  0 Filed at: 11/21/2024 1515   3a. Male UNDER 65: How often do you have five or more drinks on one occasion? 0 Filed at: 11/21/2024 1515   Audit-C Score 0 Filed at: 11/21/2024 1515   KIKI: How many times in the past year have you...    Used an illegal drug or used a prescription medication  for non-medical reasons? Never Filed at: 11/21/2024 1515                            History of Present Illness       Chief Complaint   Patient presents with    Rash     Reports rash on bilateral lower legs for several months.  Uses A&D with intermittent relief       Past Medical History:   Diagnosis Date    Chest pain     Diverticulitis     GERD (gastroesophageal reflux disease)     HTN (hypertension)     Pain in left wrist       History reviewed. No pertinent surgical history.   Family History   Problem Relation Age of Onset    No Known Problems Mother     No Known Problems Father     No Known Problems Brother       Social History     Tobacco Use    Smoking status: Never    Smokeless tobacco: Never   Vaping Use    Vaping status: Never Used   Substance Use Topics    Alcohol use: Yes     Comment: socially    Drug use: Never      E-Cigarette/Vaping    E-Cigarette Use Never User       E-Cigarette/Vaping Substances    Nicotine No     THC No     CBD No     Flavoring No     Other No     Unknown No       I have reviewed and agree with the history as documented.     59-year-old male presenting with several months of itchy rash to bilateral shins.  No known trigger.  Rash has not spread.  Patient does note that he spends time kneeling for his job but is usually wearing pants with multiple layers.  Patient also has unrelated concern regarding ear drainage.  He states that in the past he was treated for a fungal ear infection with special drops.  Since then he occasionally will get some drainage from his ears and use the drops and it will improve.  His ears are currently itchy and draining and he is running low on drops and would like a refill.  He denies significant pain or fevers.        Review of Systems   All other systems reviewed and are negative.          Objective       ED Triage Vitals [11/21/24 1512]   Temperature Pulse Blood Pressure Respirations SpO2 Patient Position - Orthostatic VS   98.5 °F (36.9 °C) 76 (!)  184/100 12 99 % Sitting      Temp Source Heart Rate Source BP Location FiO2 (%) Pain Score    Oral Monitor Right arm -- 3      Vitals      Date and Time Temp Pulse SpO2 Resp BP Pain Score FACES Pain Rating User   11/21/24 1512 98.5 °F (36.9 °C) 76 99 % 12 184/100 3 -- RR            Physical Exam  Constitutional:       General: He is not in acute distress.  HENT:      Head: Normocephalic.      Right Ear: Tympanic membrane normal.      Left Ear: Tympanic membrane normal.      Ears:      Comments: Some dryness and crusting around bilateral EAC openings without significant swelling erythema or exudates, no pain with movement of external ears     Mouth/Throat:      Mouth: Mucous membranes are moist.   Eyes:      Conjunctiva/sclera: Conjunctivae normal.   Cardiovascular:      Rate and Rhythm: Normal rate.   Pulmonary:      Effort: Pulmonary effort is normal.   Musculoskeletal:         General: Normal range of motion.   Skin:     General: Skin is warm and dry.      Comments: Keratinized patches to bilateral tibial tuberosities with trace erythema, both approximately 3 cm in diameter, no significant scale or crusting   Neurological:      General: No focal deficit present.      Mental Status: He is alert and oriented to person, place, and time.   Psychiatric:         Mood and Affect: Mood normal.         Behavior: Behavior normal.         Results Reviewed       None            No orders to display       Procedures    ED Medication and Procedure Management   Prior to Admission Medications   Prescriptions Last Dose Informant Patient Reported? Taking?   Alcohol Swabs ([employer] Alcohol Prep Pads) 70 % PADS  Self Yes No   Sig: USE ONE EVERY DAY   Ascorbic Acid (vitamin C) 1000 MG tablet  Self No No   Sig: Take 1 tablet (1,000 mg total) by mouth 2 (two) times a day   B-D UF III MINI PEN NEEDLES 31G X 5 MM MISC  Self Yes No   Sig: USE ONE EVERY DAY   Lantus SoloStar 100 units/mL injection pen  Self Yes No   Sig: INJECT 10 UNIT UNDER THE  SKIN ONCE A DAY   amLODIPine (NORVASC) 5 mg tablet  Self No No   Sig: Take 1 tablet (5 mg total) by mouth daily   aspirin (ECOTRIN LOW STRENGTH) 81 mg EC tablet  Self No No   Sig: Take 1 tablet (81 mg total) by mouth daily   atorvastatin (LIPITOR) 40 mg tablet  Self No No   Sig: Take 1 tablet (40 mg total) by mouth daily with dinner   cholecalciferol (VITAMIN D3) 1,000 units tablet  Self No No   Sig: Take 2 tablets (2,000 Units total) by mouth daily   fluticasone (FLONASE) 50 mcg/act nasal spray   No No   Si sprays into each nostril daily   losartan (COZAAR) 50 mg tablet  Self No No   Sig: Take 1 tablet (50 mg total) by mouth daily   metFORMIN (GLUCOPHAGE) 500 mg tablet  Self Yes No   Sig: Take 500 mg by mouth 2 (two) times a day   methocarbamol (Robaxin-750) 750 mg tablet   No No   Sig: Take 1 tablet (750 mg total) by mouth every 8 (eight) hours as needed for muscle spasms for up to 10 days Do not start before 2024.   metoprolol tartrate (LOPRESSOR) 50 mg tablet  Self No No   Sig: Take 1 tablet the night before your cardiac CT, take 1 tablet the morning of your cardiac CT, and take the remainder of the tablets with you to the test   naproxen (NAPROSYN) 375 mg tablet   No No   Sig: Take 1 tablet (375 mg total) by mouth every 12 (twelve) hours as needed (pain) for up to 10 days Take with food. Do not start before 2024.   ofloxacin (FLOXIN) 0.3 % otic solution   No No   Sig: Administer 5 drops into ears 2 (two) times a day   pantoprazole (PROTONIX) 40 mg tablet  Self No No   Sig: Take 1 tablet (40 mg total) by mouth daily in the early morning   predniSONE 20 mg tablet   No No   Si PO x 2 days 1 PO x 2 days      Facility-Administered Medications: None     Discharge Medication List as of 2024  4:17 PM        START taking these medications    Details   clotrimazole (LOTRIMIN) 1 % cream Apply to affected area 2 times daily, Normal           CONTINUE these medications which have NOT  CHANGED    Details   Alcohol Swabs ([employer] Alcohol Prep Pads) 70 % PADS USE ONE EVERY DAY, Historical Med      amLODIPine (NORVASC) 5 mg tablet Take 1 tablet (5 mg total) by mouth daily, Starting Mon 7/19/2021, Normal      Ascorbic Acid (vitamin C) 1000 MG tablet Take 1 tablet (1,000 mg total) by mouth 2 (two) times a day, Starting Mon 8/23/2021, Until Mon 9/27/2021, Normal      aspirin (ECOTRIN LOW STRENGTH) 81 mg EC tablet Take 1 tablet (81 mg total) by mouth daily, Starting Mon 7/19/2021, No Print      atorvastatin (LIPITOR) 40 mg tablet Take 1 tablet (40 mg total) by mouth daily with dinner, Starting Thu 5/27/2021, Until Mon 9/27/2021, Normal      B-D UF III MINI PEN NEEDLES 31G X 5 MM MISC USE ONE EVERY DAY, Historical Med      cholecalciferol (VITAMIN D3) 1,000 units tablet Take 2 tablets (2,000 Units total) by mouth daily, Starting Mon 8/23/2021, Until Mon 9/27/2021, Normal      fluticasone (FLONASE) 50 mcg/act nasal spray 2 sprays into each nostril daily, Starting Wed 3/1/2023, Normal      Lantus SoloStar 100 units/mL injection pen INJECT 10 UNIT UNDER THE SKIN ONCE A DAY, Historical Med      losartan (COZAAR) 50 mg tablet Take 1 tablet (50 mg total) by mouth daily, Starting Thu 5/27/2021, Until Mon 9/27/2021, Normal      metFORMIN (GLUCOPHAGE) 500 mg tablet Take 500 mg by mouth 2 (two) times a day, Starting Wed 9/15/2021, Historical Med      methocarbamol (Robaxin-750) 750 mg tablet Take 1 tablet (750 mg total) by mouth every 8 (eight) hours as needed for muscle spasms for up to 10 days Do not start before September 2, 2024., Starting Mon 9/2/2024, Until Thu 9/12/2024 at 2359, Normal      metoprolol tartrate (LOPRESSOR) 50 mg tablet Take 1 tablet the night before your cardiac CT, take 1 tablet the morning of your cardiac CT, and take the remainder of the tablets with you to the test, Normal      naproxen (NAPROSYN) 375 mg tablet Take 1 tablet (375 mg total) by mouth every 12 (twelve) hours as needed (pain) for  up to 10 days Take with food. Do not start before September 2, 2024., Starting Mon 9/2/2024, Until Thu 9/12/2024 at 2359, Normal      ofloxacin (FLOXIN) 0.3 % otic solution Administer 5 drops into ears 2 (two) times a day, Starting Wed 3/1/2023, Normal      pantoprazole (PROTONIX) 40 mg tablet Take 1 tablet (40 mg total) by mouth daily in the early morning, Starting Sun 4/18/2021, Until Mon 9/27/2021, Normal      predniSONE 20 mg tablet 2 PO x 2 days 1 PO x 2 days, Normal           No discharge procedures on file.  ED SEPSIS DOCUMENTATION   Time reflects when diagnosis was documented in both MDM as applicable and the Disposition within this note       Time User Action Codes Description Comment    11/21/2024  4:14 PM Suma Jara [R21] Rash and nonspecific skin eruption     11/21/2024  4:26 PM Suma Jara [B36.9,  H62.40] Fungal otitis externa                  Suma Jara MD  11/21/24 2038

## 2025-01-16 ENCOUNTER — OFFICE VISIT (OUTPATIENT)
Dept: OBGYN CLINIC | Facility: CLINIC | Age: 60
End: 2025-01-16
Payer: COMMERCIAL

## 2025-01-16 VITALS — BODY MASS INDEX: 27.47 KG/M2 | HEIGHT: 67 IN | WEIGHT: 175 LBS

## 2025-01-16 DIAGNOSIS — G89.29 CHRONIC RIGHT SHOULDER PAIN: ICD-10-CM

## 2025-01-16 DIAGNOSIS — M19.011 PRIMARY OSTEOARTHRITIS OF RIGHT SHOULDER: ICD-10-CM

## 2025-01-16 DIAGNOSIS — M25.511 CHRONIC RIGHT SHOULDER PAIN: ICD-10-CM

## 2025-01-16 DIAGNOSIS — M25.811 IMPINGEMENT OF RIGHT SHOULDER: Primary | ICD-10-CM

## 2025-01-16 PROCEDURE — 99214 OFFICE O/P EST MOD 30 MIN: CPT | Performed by: ORTHOPAEDIC SURGERY
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
## 2025-01-20 ENCOUNTER — TELEPHONE (OUTPATIENT)
Age: 60
End: 2025-01-20

## 2025-01-20 NOTE — TELEPHONE ENCOUNTER
Called patient back and patient will call back to make appt with Dr. Armstrong for appt at [facility]

## 2025-01-21 ENCOUNTER — PROCEDURE VISIT (OUTPATIENT)
Age: 60
End: 2025-01-21
Payer: COMMERCIAL

## 2025-01-21 VITALS
BODY MASS INDEX: 27.47 KG/M2 | SYSTOLIC BLOOD PRESSURE: 156 MMHG | HEIGHT: 67 IN | DIASTOLIC BLOOD PRESSURE: 80 MMHG | WEIGHT: 175 LBS | OXYGEN SATURATION: 95 % | HEART RATE: 77 BPM

## 2025-01-21 DIAGNOSIS — M99.03 SEGMENTAL DYSFUNCTION OF LUMBAR REGION: ICD-10-CM

## 2025-01-21 DIAGNOSIS — G44.86 CERVICOGENIC HEADACHE: ICD-10-CM

## 2025-01-21 DIAGNOSIS — M99.02 SEGMENTAL DYSFUNCTION OF THORACIC REGION: ICD-10-CM

## 2025-01-21 DIAGNOSIS — M99.01 SEGMENTAL DYSFUNCTION OF CERVICAL REGION: ICD-10-CM

## 2025-01-21 DIAGNOSIS — M79.18 MYOFASCIAL PAIN: Primary | ICD-10-CM

## 2025-01-21 DIAGNOSIS — M53.3 SACROILIAC JOINT DYSFUNCTION: ICD-10-CM

## 2025-01-21 PROCEDURE — 99213 OFFICE O/P EST LOW 20 MIN: CPT | Performed by: CHIROPRACTOR

## 2025-01-21 PROCEDURE — 98941 CHIROPRACT MANJ 3-4 REGIONS: CPT | Performed by: CHIROPRACTOR

## 2025-01-21 NOTE — PATIENT INSTRUCTIONS
The patient was advised to continue with the icing and stretching instructions.   The patient was advised to focus on improving hamstring flexibility by doing the hamstring stretch frequently. The need to stretch to the point of tension only and not to the point of pain was emphasized. The patient was advised to do the stretch at 3 sets of 15 to 20 seconds several times per day.
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
## 2025-01-23 ENCOUNTER — PROCEDURE VISIT (OUTPATIENT)
Age: 60
End: 2025-01-23
Payer: COMMERCIAL

## 2025-01-23 VITALS
SYSTOLIC BLOOD PRESSURE: 138 MMHG | HEIGHT: 67 IN | DIASTOLIC BLOOD PRESSURE: 76 MMHG | WEIGHT: 175 LBS | OXYGEN SATURATION: 98 % | HEART RATE: 92 BPM | BODY MASS INDEX: 27.47 KG/M2

## 2025-01-23 DIAGNOSIS — G44.86 CERVICOGENIC HEADACHE: ICD-10-CM

## 2025-01-23 DIAGNOSIS — M99.02 SEGMENTAL DYSFUNCTION OF THORACIC REGION: ICD-10-CM

## 2025-01-23 DIAGNOSIS — M99.03 SEGMENTAL DYSFUNCTION OF LUMBAR REGION: ICD-10-CM

## 2025-01-23 DIAGNOSIS — M79.18 MYOFASCIAL PAIN: Primary | ICD-10-CM

## 2025-01-23 DIAGNOSIS — M53.3 SACROILIAC JOINT DYSFUNCTION: ICD-10-CM

## 2025-01-23 DIAGNOSIS — M99.01 SEGMENTAL DYSFUNCTION OF CERVICAL REGION: ICD-10-CM

## 2025-01-23 PROCEDURE — 98941 CHIROPRACT MANJ 3-4 REGIONS: CPT | Performed by: CHIROPRACTOR

## 2025-01-23 NOTE — PROGRESS NOTES
Assessment:  The patient is progressing as expected.  The patient was advised to schedule 3 days between chiropractic visits if he notices most relief of cervical symptoms with Graston Technique.  The muscles need to heal between Graston Technique treatments.    1. Myofascial pain        2. Cervicogenic headache        3. Segmental dysfunction of cervical region        4. Segmental dysfunction of thoracic region        5. Sacroiliac joint dysfunction        6. Segmental dysfunction of lumbar region          Plan:  Treatment today consisted of myofascial release via active release technique to the bilateral rectus capitis posterior major and minor, splenius capitis, and longissimus capitis.    Manipulation was performed to the right sacroiliac joint via Samaniego low force drop technique. Contact was made to the right ischial tuberosity and the left lateral aspect of the  apex of the sacrum. Manipulation was performed to the lumbar restrictions via manual lumbar flexion distraction technique. Manipulation was performed to the lower thoracic restrictions via grade 1 mobilization techniques in the prone position.    PNF stretching was performed to the bilateral hamstrings.    Subjective:  The patient reported feeling much better in the suboccipital and posterior lateral cervical region when compared to his chiropractic reevaluation (last visit).  He has less intense discomfort/pain in the suboccipital/posterior lateral cervical region. The patient stated he continues to experience pain in the right anterior shoulder.  He stated he works as a  and was doing work for a person today.  Despite reported feeling decreased symptoms in the cervical and suboccipital region the patient described his cervical symptoms as a 4 on a 0-10 pain scale today which is higher than the last chiropractic visit.  He described his right shoulder pain as a 4 on a 0-10 pain scale today.  The patient informed my assistant that his pain  level in the right shoulder was a 9 on a 0-10 pain scale last night.   The patient has intermittent pain in the right lower back region (approximately 1 time per month).  It is dependent upon activity.      Objective:  Negative Derefield on the right. Active myofascial trigger points were present in the bilateral rectus capitis posterior major and minor, bilateral longissimus capitis, superior trapezius, levator scapulae, and bilateral splenius capitis. Active myofascial trigger points were present in the right lumbar erector spinae, quadratus lumborum, and gluteus medius. Intersegmental motion was decreased in the cervical spine including joint dysfunctions at C1-C2, C5-C6, and C6-C7. Intersegmental motion was decreased in the thoracic spine including joint dysfunctions at T3-4, T6-7, T7-8, and T10-11. Intersegmental motion was decreased in the lumbar spine including joint dysfunctions at L2-3 and L3-4. Intersegmental motion was decreased in the right sacroiliac joint.       I have used the Epic copy/forward function to compose this note.  I have reviewed my current note to ensure it reflects the current patient status, exam, assessment and plan.    Dictation voice to text software has been used in the creation of this document. Please consider this in light of any contextual or grammatical errors.

## 2025-01-27 ENCOUNTER — HOSPITAL ENCOUNTER (EMERGENCY)
Facility: HOSPITAL | Age: 60
Discharge: HOME | End: 2025-01-27
Attending: EMERGENCY MEDICINE
Payer: COMMERCIAL

## 2025-01-27 ENCOUNTER — APPOINTMENT (EMERGENCY)
Dept: CT IMAGING | Facility: HOSPITAL | Age: 60
End: 2025-01-27
Payer: COMMERCIAL

## 2025-01-27 VITALS
OXYGEN SATURATION: 99 % | RESPIRATION RATE: 16 BRPM | HEART RATE: 82 BPM | HEIGHT: 67 IN | BODY MASS INDEX: 28.48 KG/M2 | DIASTOLIC BLOOD PRESSURE: 94 MMHG | TEMPERATURE: 98 F | SYSTOLIC BLOOD PRESSURE: 184 MMHG | WEIGHT: 181.44 LBS

## 2025-01-27 DIAGNOSIS — I10 HTN (HYPERTENSION): ICD-10-CM

## 2025-01-27 DIAGNOSIS — I25.10 ATHEROSCLEROTIC HEART DISEASE: ICD-10-CM

## 2025-01-27 DIAGNOSIS — R23.4 THICKENING OF SKIN: Primary | ICD-10-CM

## 2025-01-27 PROCEDURE — 71250 CT THORAX DX C-: CPT

## 2025-01-27 PROCEDURE — 99283 EMERGENCY DEPT VISIT LOW MDM: CPT

## 2025-01-27 PROCEDURE — 99284 EMERGENCY DEPT VISIT MOD MDM: CPT | Performed by: PHYSICIAN ASSISTANT

## 2025-01-28 ENCOUNTER — PROCEDURE VISIT (OUTPATIENT)
Age: 60
End: 2025-01-28
Payer: COMMERCIAL

## 2025-01-28 DIAGNOSIS — M99.03 SEGMENTAL DYSFUNCTION OF LUMBAR REGION: ICD-10-CM

## 2025-01-28 DIAGNOSIS — G44.86 CERVICOGENIC HEADACHE: ICD-10-CM

## 2025-01-28 DIAGNOSIS — M99.01 SEGMENTAL DYSFUNCTION OF CERVICAL REGION: ICD-10-CM

## 2025-01-28 DIAGNOSIS — M79.18 MYOFASCIAL PAIN: Primary | ICD-10-CM

## 2025-01-28 DIAGNOSIS — M53.3 SACROILIAC JOINT DYSFUNCTION: ICD-10-CM

## 2025-01-28 DIAGNOSIS — M99.02 SEGMENTAL DYSFUNCTION OF THORACIC REGION: ICD-10-CM

## 2025-01-28 PROCEDURE — 97110 THERAPEUTIC EXERCISES: CPT | Performed by: CHIROPRACTOR

## 2025-01-28 PROCEDURE — 98941 CHIROPRACT MANJ 3-4 REGIONS: CPT | Performed by: CHIROPRACTOR

## 2025-01-28 NOTE — DISCHARGE INSTRUCTIONS
Please return to the emergency department for worsening symptoms including chest pain, shortness of breath, dizziness, lightheadedness, fever greater than 103, severe pain, inability to walk, fainting episodes, etc..  Please follow-up with your family practice provider as soon as possible.  Follow-up with your PCP for atherosclerosis of heart and your chronic hypertension.  Follow-up with dermatology for the lesions on your skin.

## 2025-01-28 NOTE — PROGRESS NOTES
Assessment:  The patient is progressing as expected.    1. Myofascial pain        2. Cervicogenic headache        3. Segmental dysfunction of cervical region        4. Segmental dysfunction of thoracic region        5. Sacroiliac joint dysfunction        6. Segmental dysfunction of lumbar region          Plan:  Treatment today consisted of myofascial release via Graston Technique to the bilateral suboccipital musculature, splenius capitis, and longissimus capitis (superficial).  GT 3 was used.    Manipulation was performed to the right sacroiliac joint via Samaniego low force drop technique. Contact was made to the right ischial tuberosity and the left lateral aspect of the  apex of the sacrum. Manipulation was performed to the lumbar restrictions via manual lumbar flexion distraction technique. Manipulation was performed to the lower cervical restrictions via gentle cervical traction technique.  Manipulation was performed to the upper cervical restrictions via supine stairstepping technique.    Therapeutic exercises were performed with the goal of improving flexibility and function. The therapeutic exercises were performed for at least 8 minutes as I performed PNF stretching to the bilateral gluteus medius and piriformis and bilateral  hamstrings.    Discussion:  After PNF stretching to the bilateral gluteus medius, piriformis, and hamstrings hamstring flexibility was mild to moderately limited.    Subjective:  The patient reported feeling return of pain in the suboccipital and posterior lateral cervical region this morning.  The patient also reported that he has noticed headaches return again. The patient described his cervical symptoms as a 7 on a 0-10 pain scale today.  He described his right shoulder pain as a 5 or 6 on a 0-10 pain scale today.  He informed my assistant that his right shoulder pain is worse at night.  The patient informed my assistant that he is sharp pain in the right shoulder with lifting his  arm.  The patient has intermittent pain in the right lower back region that is dependent upon activity.      Objective:  Negative Derefield on the right. Active myofascial trigger points were present in the bilateral rectus capitis posterior major and minor, bilateral longissimus capitis, superior trapezius, levator scapulae, and bilateral splenius capitis. Active myofascial trigger points were present in the right lumbar erector spinae, quadratus lumborum, and gluteus medius. Intersegmental motion was decreased in the cervical spine including joint dysfunctions at C1-C2, C5-C6, and C6-C7. Intersegmental motion was decreased in the thoracic spine including joint dysfunctions at T3-4, T6-7, T7-8, and T10-11. Intersegmental motion was decreased in the lumbar spine including joint dysfunctions at L2-3 and L3-4. Intersegmental motion was decreased in the right sacroiliac joint.  Bilateral hamstring flexibility has improved.       I have used the Epic copy/forward function to compose this note.  I have reviewed my current note to ensure it reflects the current patient status, exam, assessment and plan.    Dictation voice to text software has been used in the creation of this document. Please consider this in light of any contextual or grammatical errors.

## 2025-01-29 ENCOUNTER — HOSPITAL ENCOUNTER (OUTPATIENT)
Dept: MRI IMAGING | Facility: HOSPITAL | Age: 60
Discharge: HOME | End: 2025-01-29
Attending: ORTHOPAEDIC SURGERY
Payer: COMMERCIAL

## 2025-01-29 DIAGNOSIS — M25.811 IMPINGEMENT OF RIGHT SHOULDER: ICD-10-CM

## 2025-01-29 DIAGNOSIS — G89.29 CHRONIC RIGHT SHOULDER PAIN: ICD-10-CM

## 2025-01-29 DIAGNOSIS — M25.511 CHRONIC RIGHT SHOULDER PAIN: ICD-10-CM

## 2025-01-29 DIAGNOSIS — M19.011 PRIMARY OSTEOARTHRITIS OF RIGHT SHOULDER: ICD-10-CM

## 2025-01-29 PROCEDURE — 73221 MRI JOINT UPR EXTREM W/O DYE: CPT

## 2025-01-31 ENCOUNTER — PROCEDURE VISIT (OUTPATIENT)
Age: 60
End: 2025-01-31
Payer: COMMERCIAL

## 2025-01-31 VITALS
HEART RATE: 102 BPM | HEIGHT: 67 IN | DIASTOLIC BLOOD PRESSURE: 92 MMHG | SYSTOLIC BLOOD PRESSURE: 172 MMHG | BODY MASS INDEX: 28.41 KG/M2 | WEIGHT: 181 LBS | OXYGEN SATURATION: 96 %

## 2025-01-31 VITALS
WEIGHT: 181 LBS | BODY MASS INDEX: 28.41 KG/M2 | HEART RATE: 95 BPM | OXYGEN SATURATION: 99 % | DIASTOLIC BLOOD PRESSURE: 82 MMHG | SYSTOLIC BLOOD PRESSURE: 166 MMHG | HEIGHT: 67 IN

## 2025-01-31 DIAGNOSIS — M53.3 SACROILIAC JOINT DYSFUNCTION: ICD-10-CM

## 2025-01-31 DIAGNOSIS — M99.02 SEGMENTAL DYSFUNCTION OF THORACIC REGION: ICD-10-CM

## 2025-01-31 DIAGNOSIS — G44.86 CERVICOGENIC HEADACHE: ICD-10-CM

## 2025-01-31 DIAGNOSIS — M99.01 SEGMENTAL DYSFUNCTION OF CERVICAL REGION: ICD-10-CM

## 2025-01-31 DIAGNOSIS — M79.18 MYOFASCIAL PAIN: Primary | ICD-10-CM

## 2025-01-31 DIAGNOSIS — M99.03 SEGMENTAL DYSFUNCTION OF LUMBAR REGION: ICD-10-CM

## 2025-01-31 PROCEDURE — 98941 CHIROPRACT MANJ 3-4 REGIONS: CPT | Performed by: CHIROPRACTOR

## 2025-01-31 NOTE — PROGRESS NOTES
"Assessment:  This is an exacerbation of a current condition.  The patient was advised to bring his pillow to the next appointment to determine if it is contributing to worsened neck symptoms.  The patient was advised to avoid sleeping in the prone and right side-lying position. Proper sleeping postures and use of pillows were discussed.    1. Myofascial pain        2. Cervicogenic headache        3. Segmental dysfunction of cervical region        4. Segmental dysfunction of thoracic region        5. Sacroiliac joint dysfunction        6. Segmental dysfunction of lumbar region          Plan:  Treatment today consisted of myofascial release via Graston Technique to the bilateral suboccipital musculature, splenius capitis, and longissimus capitis (superficial).  GT 3 was used.    Manipulation was performed to the right sacroiliac joint via Samaniego low force drop technique. Contact was made to the right ischial tuberosity and the left lateral aspect of the  apex of the sacrum. Manipulation was performed to the lumbar restrictions via manual lumbar flexion distraction technique. Manipulation was performed to the lower cervical restrictions via gentle cervical traction technique.  Manipulation was performed to the upper cervical restrictions via supine stairstepping technique.    Subjective:  The patient reported feeling better following his last chiropractic visit.  However he stated that he woke up at 4 AM earlier this morning and had increased neck pain as well as a headache.  He stated that he sleeps in \"all different positions\" but avoid sleeping on the right side because of his right shoulder pain.  He indicated that he often sleeps in the supine position.  He has pain in the suboccipital and posterior lateral cervical region. The patient described his cervical symptoms as a 4 on a 0-10 pain scale today.  He described his right shoulder pain as a 4 on a 0-10 pain scale today.  The patient reported that he went for " a walk in a park today and feels soreness and aching in the lower back region.     Objective:  Negative Derefield on the right. Active myofascial trigger points were present in the bilateral rectus capitis posterior major and minor, bilateral longissimus capitis, superior trapezius, levator scapulae, and bilateral splenius capitis. Active myofascial trigger points were present in the right lumbar erector spinae, quadratus lumborum, and gluteus medius. Intersegmental motion was decreased in the cervical spine including joint dysfunctions at C1-C2 and C5-C6. Intersegmental motion was decreased in the thoracic spine including joint dysfunctions at T3-4, T6-7, T7-8, and T10-11. Intersegmental motion was decreased in the lumbar spine including joint dysfunctions at L2-3 and L5-S1. Intersegmental motion was decreased in the right sacroiliac joint.       I have used the Epic copy/forward function to compose this note.  I have reviewed my current note to ensure it reflects the current patient status, exam, assessment and plan.    Dictation voice to text software has been used in the creation of this document. Please consider this in light of any contextual or grammatical errors.

## 2025-01-31 NOTE — PATIENT INSTRUCTIONS
The patient was advised to bring his pillow to the next appointment to determine if it is contributing to worsened neck symptoms.  The patient was advised to avoid sleeping in the prone position. Proper sleeping postures and use of pillows were discussed.

## 2025-02-04 ENCOUNTER — PROCEDURE VISIT (OUTPATIENT)
Age: 60
End: 2025-02-04
Payer: COMMERCIAL

## 2025-02-04 VITALS
SYSTOLIC BLOOD PRESSURE: 172 MMHG | HEART RATE: 93 BPM | OXYGEN SATURATION: 97 % | DIASTOLIC BLOOD PRESSURE: 86 MMHG | BODY MASS INDEX: 28.41 KG/M2 | HEIGHT: 67 IN | WEIGHT: 181 LBS

## 2025-02-04 DIAGNOSIS — G44.86 CERVICOGENIC HEADACHE: ICD-10-CM

## 2025-02-04 DIAGNOSIS — M99.02 SEGMENTAL DYSFUNCTION OF THORACIC REGION: ICD-10-CM

## 2025-02-04 DIAGNOSIS — M99.01 SEGMENTAL DYSFUNCTION OF CERVICAL REGION: ICD-10-CM

## 2025-02-04 DIAGNOSIS — M79.18 MYOFASCIAL PAIN: Primary | ICD-10-CM

## 2025-02-04 DIAGNOSIS — M99.03 SEGMENTAL DYSFUNCTION OF LUMBAR REGION: ICD-10-CM

## 2025-02-04 DIAGNOSIS — M53.3 SACROILIAC JOINT DYSFUNCTION: ICD-10-CM

## 2025-02-04 PROCEDURE — 98941 CHIROPRACT MANJ 3-4 REGIONS: CPT | Performed by: CHIROPRACTOR

## 2025-02-04 PROCEDURE — 97110 THERAPEUTIC EXERCISES: CPT | Performed by: CHIROPRACTOR

## 2025-02-04 NOTE — PROGRESS NOTES
Assessment:  The patient's pillow forces his neck into forward flexion when lying in the supine position.  Therefore he was encouraged to use a more thin pillow so that his neck is in a more neutral position.  He was also advised to avoid bunching the pillow under his neck as it forced his neck into further cervical flexion.  The patient's blood pressure was elevated today.  He was advised to follow-up with Dorys Lucero MD (PCP, general medicine) for further evaluation.    1. Myofascial pain        2. Cervicogenic headache        3. Segmental dysfunction of cervical region        4. Segmental dysfunction of thoracic region        5. Sacroiliac joint dysfunction        6. Segmental dysfunction of lumbar region          Plan:  Treatment today consisted of myofascial release via Graston Technique to the bilateral suboccipital musculature, splenius capitis, and longissimus capitis (superficial).  GT 3 was used.    Manipulation was performed to the right sacroiliac joint via Samaniego low force drop technique. Contact was made to the right ischial tuberosity and the left lateral aspect of the  apex of the sacrum. Manipulation was performed to the lumbar restrictions via manual lumbar flexion distraction technique. Manipulation was performed to the lower cervical restrictions via gentle cervical traction technique.  Manipulation was performed to the upper cervical restrictions via supine stairstepping technique.    Therapeutic exercises were performed with the goal of improving flexibility and function. The therapeutic exercises were performed for at least 8 minutes as I performed PNF stretching to the bilateral gluteus medius and piriformis and bilateral  hamstrings.    Subjective:  The patient reported feeling better when compared to his last chiropractic visit.  However, once again he stated that he woke up this morning and had increased neck pain as well as a headache.  The patient reported that he has been  "sleeping in the supine position.  He has pain in the suboccipital and posterior lateral cervical region. The patient described his cervical symptoms as a 4 on a 0-10 pain scale today.  He described his right shoulder pain as a 4 on a 0-10 pain scale today.  The patient reported that his lower back pain is much better when compared to his last chiropractic visit.  He stated that his back pain is intermittently noted and depending on activity.    Objective:    Vitals:    02/04/25 1409   BP: (!) 172/86   Pulse: 93   SpO2: 97%   Weight: 82.1 kg (181 lb)   Height: 5' 7\" (1.702 m)     Upon inspection the patient's pillow forces his neck into cervical flexion when lying in the supine position.  He indicated that he rolls it up somewhat and this forced his neck into further cervical flexion.    Negative Derefield on the right. Active myofascial trigger points were present in the bilateral rectus capitis posterior major and minor, bilateral longissimus capitis, superior trapezius, levator scapulae, and bilateral splenius capitis. Active myofascial trigger points were present in the right lumbar erector spinae, quadratus lumborum, and gluteus medius. Intersegmental motion was decreased in the cervical spine including joint dysfunctions at C1-C2 and C5-C6. Intersegmental motion was decreased in the thoracic spine including joint dysfunctions at T3-4, T6-7, T7-8, and T10-11. Intersegmental motion was decreased in the lumbar spine including joint dysfunctions at L2-3 and L5-S1. Intersegmental motion was decreased in the right sacroiliac joint.  Bilateral gluteal and hamstring flexibility is improving.       I have used the Epic copy/forward function to compose this note.  I have reviewed my current note to ensure it reflects the current patient status, exam, assessment and plan.    Dictation voice to text software has been used in the creation of this document. Please consider this in light of any contextual or grammatical " errors.

## 2025-02-04 NOTE — LETTER
February 4, 2025     Dorys Lucero MD  [id number] [facility] [id number]    Patient: Asaf Moise   YOB: 1965   Date of Visit: 2/4/2025       Dear Dr. Lucero:      Asaf Moise has had elevated blood pressure readings at his last few chiropractic visits.           Sincerely,        Isma Armstrong DC        CC: No Recipients

## 2025-02-14 ENCOUNTER — PROCEDURE VISIT (OUTPATIENT)
Age: 60
End: 2025-02-14
Payer: COMMERCIAL

## 2025-02-14 VITALS
OXYGEN SATURATION: 96 % | HEIGHT: 67 IN | WEIGHT: 181 LBS | DIASTOLIC BLOOD PRESSURE: 88 MMHG | SYSTOLIC BLOOD PRESSURE: 160 MMHG | BODY MASS INDEX: 28.41 KG/M2 | HEART RATE: 54 BPM

## 2025-02-14 DIAGNOSIS — M99.01 SEGMENTAL DYSFUNCTION OF CERVICAL REGION: ICD-10-CM

## 2025-02-14 DIAGNOSIS — M79.18 MYOFASCIAL PAIN: Primary | ICD-10-CM

## 2025-02-14 DIAGNOSIS — G44.86 CERVICOGENIC HEADACHE: ICD-10-CM

## 2025-02-14 DIAGNOSIS — M53.3 SACROILIAC JOINT DYSFUNCTION: ICD-10-CM

## 2025-02-14 DIAGNOSIS — M99.02 SEGMENTAL DYSFUNCTION OF THORACIC REGION: ICD-10-CM

## 2025-02-14 DIAGNOSIS — M99.03 SEGMENTAL DYSFUNCTION OF LUMBAR REGION: ICD-10-CM

## 2025-02-14 PROCEDURE — 98941 CHIROPRACT MANJ 3-4 REGIONS: CPT | Performed by: CHIROPRACTOR

## 2025-02-14 PROCEDURE — 97110 THERAPEUTIC EXERCISES: CPT | Performed by: CHIROPRACTOR

## 2025-02-14 NOTE — PROGRESS NOTES
Assessment:  This is exacerbation of lower back symptoms.    1. Myofascial pain        2. Cervicogenic headache        3. Segmental dysfunction of cervical region        4. Segmental dysfunction of thoracic region        5. Sacroiliac joint dysfunction        6. Segmental dysfunction of lumbar region          Plan:  Treatment today consisted of myofascial release via Graston Technique to the bilateral suboccipital musculature, splenius capitis, and longissimus capitis, bilateral medial upper thoracic musculature including the medial superior trapezius and semispinalis capitis (superficial).  GT 3 was used.    Manipulation was performed to the right sacroiliac joint via Samaniego low force drop technique. Contact was made to the right ischial tuberosity and the left lateral aspect of the  apex of the sacrum. Manipulation was performed to the lumbar restrictions via manual lumbar flexion distraction technique. Manipulation was performed to the lower cervical restrictions via gentle cervical traction technique.  Manipulation was performed to the upper cervical restrictions via supine stairstepping technique.    Therapeutic exercises were performed with the goal of improving flexibility and function. The therapeutic exercises were performed for at least 8 minutes as I performed PNF stretching to the bilateral gluteus medius and piriformis and bilateral  hamstrings.    Subjective:  The patient reported feeling better when compared to his last chiropractic visit.  The patient stated the headaches have been less intense as he has been only using 1 pillow under his head when sleeping at night.  The patient's stated he had an evaluation with his primary care physician and has been taking his blood pressure medication during the day and also before going to bed.  He acknowledges that this may be assisting in his headaches as well.  The patient reported he is mainly aware of symptoms in the bilateral suboccipital/posterior  "cervical and cervicothoracic/medial upper thoracic region.  Despite reporting feeling better the patient described his cervical symptoms as a 7 on a 0-10 pain scale which is higher than last chiropractic visit.  The patient informed my assistant that he is having constant pressure when at rest but his pain level is a 7 when he moves his neck.  He described his right shoulder pain as a 6 on a 0-10 pain scale today.  The patient reported that he woke up on Tuesday with his lower back \"locked.\"  The patient stated that his lower back symptoms subsided with stretching.  My assistant informed me that the patient told her that he did not have an evaluation of his blood pressure with his physician and has not been consistent with taking his blood pressure medication.  My assistant informed me that the patient said he has an appointment for evaluation of his blood pressure next week.    Objective:    Vitals:    02/14/25 1116   BP: 160/88   Pulse: (!) 54   SpO2: 96%   Weight: 82.1 kg (181 lb)   Height: 5' 7\" (1.702 m)     Negative Derefield on the right. Active myofascial trigger points were present in the bilateral rectus capitis posterior major and minor, bilateral longissimus capitis, superior trapezius, levator scapulae, and bilateral splenius capitis. Active myofascial trigger points were present in the right lumbar erector spinae, quadratus lumborum, and gluteus medius. Intersegmental motion was decreased in the cervical spine including joint dysfunctions at C1-C2 and C5-C6. Intersegmental motion was decreased in the thoracic spine including joint dysfunctions at T3-4, T6-7, T7-8, and T10-11. Intersegmental motion was decreased in the lumbar spine including joint dysfunctions at L2-3 and L5-S1. Intersegmental motion was decreased in the right sacroiliac joint.  Bilateral gluteal flexibility improved with PNF stretching.  Right gluteal flexibility was more limited when compared to the left.  After PNF stretching to the " gluteus medius and piriformis, bilateral hamstring flexibility improved       I have used the Epic copy/forward function to compose this note.  I have reviewed my current note to ensure it reflects the current patient status, exam, assessment and plan.    Dictation voice to text software has been used in the creation of this document. Please consider this in light of any contextual or grammatical errors.

## 2025-02-18 ENCOUNTER — TELEPHONE (OUTPATIENT)
Age: 60
End: 2025-02-18

## 2025-02-18 NOTE — TELEPHONE ENCOUNTER
LMOM to notify patient Dr. Armstrong is out sick and appt today will have to be rescheduled. Left call back number to confirm cancellation and reschedule patient.

## 2025-02-25 ENCOUNTER — TELEPHONE (OUTPATIENT)
Age: 60
End: 2025-02-25

## 2025-02-25 NOTE — TELEPHONE ENCOUNTER
Caller: Patient    Doctor/Office: Dr Alexandre    Call regarding :   request copy of MRI results     Call was transferred to: Medical Records

## 2025-03-07 ENCOUNTER — PROCEDURE VISIT (OUTPATIENT)
Age: 60
End: 2025-03-07
Payer: COMMERCIAL

## 2025-03-07 VITALS
HEIGHT: 67 IN | DIASTOLIC BLOOD PRESSURE: 80 MMHG | SYSTOLIC BLOOD PRESSURE: 174 MMHG | WEIGHT: 181 LBS | OXYGEN SATURATION: 95 % | HEART RATE: 78 BPM | BODY MASS INDEX: 28.41 KG/M2

## 2025-03-07 DIAGNOSIS — G44.86 CERVICOGENIC HEADACHE: ICD-10-CM

## 2025-03-07 DIAGNOSIS — M79.18 MYOFASCIAL PAIN: Primary | ICD-10-CM

## 2025-03-07 DIAGNOSIS — M99.02 SEGMENTAL DYSFUNCTION OF THORACIC REGION: ICD-10-CM

## 2025-03-07 DIAGNOSIS — M99.01 SEGMENTAL DYSFUNCTION OF CERVICAL REGION: ICD-10-CM

## 2025-03-07 DIAGNOSIS — M53.3 SACROILIAC JOINT DYSFUNCTION: ICD-10-CM

## 2025-03-07 DIAGNOSIS — M99.03 SEGMENTAL DYSFUNCTION OF LUMBAR REGION: ICD-10-CM

## 2025-03-07 PROCEDURE — 98941 CHIROPRACT MANJ 3-4 REGIONS: CPT | Performed by: CHIROPRACTOR

## 2025-03-07 RX ORDER — LISINOPRIL 20 MG/1
1 TABLET ORAL DAILY
COMMUNITY
Start: 2025-02-28

## 2025-03-07 NOTE — PATIENT INSTRUCTIONS
The patient was advised to avoid sleeping in the prone and right side lying positions. Proper sleeping postures and use of pillows were discussed.  The patient was informed that he may experience additional soreness following the today's treatment visit. The need to continue with the stretching exercises and apply ice in 15-minute intervals was discussed with the patient.   The need to apply ice in 15 minute intervals several times per day (minimum of 3 times per day) was discussed with the patient. Otherwise the patient's progress may be slowed.

## 2025-03-07 NOTE — PROGRESS NOTES
"Assessment:  Once again this is exacerbation of lower back symptoms.  Due to the persistence of pain in the right lower back/sacroiliac region as well as the persistence of headaches the patient was referred to [facility]el[facility] MD (spine and pain medicine) for evaluation.  There is no record in the Epic system of an appointment with the emergency department for his exacerbation back symptoms on Tuesday morning.  The patient was advised to avoid sleeping in the prone and right side lying positions. Proper sleeping postures and use of pillows were discussed.  The need to apply ice in 15 minute intervals several times per day (minimum of 3 times per day) was discussed with the patient. Otherwise the patient's progress may be slowed.    1. Myofascial pain        2. Cervicogenic headache        3. Segmental dysfunction of cervical region        4. Segmental dysfunction of thoracic region        5. Sacroiliac joint dysfunction        6. Segmental dysfunction of lumbar region          Plan:  Treatment today consisted of myofascial release via Graston Technique to the bilateral suboccipital musculature, splenius capitis, and longissimus capitis (superficial), right lumbar erector spinae, right quadratus lumborum, and right gluteus medius.  GT 3 was used.    Manipulation was performed to the right sacroiliac joint via Samaniego low force drop technique. Contact was made to the right ischial tuberosity and the left lateral aspect of the  apex of the sacrum. Manipulation was performed to the lumbar restrictions via manual lumbar flexion distraction technique. Manipulation was performed to the lower cervical restrictions via gentle cervical traction technique.  Manipulation was performed to the upper cervical restrictions via supine stairstepping technique.    Subjective:  The patient reported his headaches have been the same intensity and frequency as the last chiropractic visit.  He stated the headaches \"come and go.\"  The " "patient has symptoms in the bilateral suboccipital/posterior cervical region.  The patient described his cervical symptoms as a 4 on a 0-10 pain scale today.  He described his right shoulder pain as a 3 on a 0-10 pain scale today.  The patient informed my assistant that his right shoulder symptoms increase with activity.  Once again the patient reported that he woke up on Tuesday (earlier this week) with his lower back \"locked.\"  He stated that he sleeps in the supine position or either side-lying position.  He reported that he went to the emergency department for evaluation as he had difficulty standing upright.  The patient also reported he can feel a \"lump\" in the right lateral suboccipital region.    Objective:  Negative Derefield on the right. Active myofascial trigger points were present in the bilateral rectus capitis posterior major and minor, bilateral longissimus capitis, superior trapezius, levator scapulae, and bilateral splenius capitis.  Trigger point activity and hypertonicity in the suboccipital and posterior lateral cervical musculature is significantly less (70% less overall).  The \"lump\" that the patient is referring to was palpated and was relatively flat, pea-sized (max), superficial, and at the dermal level.  Active myofascial trigger points were present in the right lumbar erector spinae, quadratus lumborum, and gluteus medius. Intersegmental motion was decreased in the cervical spine including joint dysfunctions at C1-C2 and C5-C6. Intersegmental motion was decreased in the thoracic spine including joint dysfunctions at T3-4, T6-7, T7-8, and T10-11. Intersegmental motion was decreased in the lumbar spine including joint dysfunctions at L2-3 and L5-S1. Intersegmental motion was decreased in the right sacroiliac joint.        I have used the Epic copy/forward function to compose this note.  I have reviewed my current note to ensure it reflects the current patient status, exam, assessment and " plan.    Dictation voice to text software has been used in the creation of this document. Please consider this in light of any contextual or grammatical errors.

## 2025-03-11 ENCOUNTER — TELEPHONE (OUTPATIENT)
Age: 60
End: 2025-03-11
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
## 2025-03-12 ENCOUNTER — PROCEDURE VISIT (OUTPATIENT)
Age: 60
End: 2025-03-12
Payer: COMMERCIAL

## 2025-03-12 VITALS
DIASTOLIC BLOOD PRESSURE: 84 MMHG | WEIGHT: 181 LBS | BODY MASS INDEX: 28.41 KG/M2 | HEIGHT: 67 IN | SYSTOLIC BLOOD PRESSURE: 138 MMHG

## 2025-03-12 DIAGNOSIS — M99.03 SEGMENTAL DYSFUNCTION OF LUMBAR REGION: ICD-10-CM

## 2025-03-12 DIAGNOSIS — M53.3 SACROILIAC JOINT DYSFUNCTION: ICD-10-CM

## 2025-03-12 DIAGNOSIS — M79.18 MYOFASCIAL PAIN: Primary | ICD-10-CM

## 2025-03-12 DIAGNOSIS — M99.02 SEGMENTAL DYSFUNCTION OF THORACIC REGION: ICD-10-CM

## 2025-03-12 DIAGNOSIS — M99.01 SEGMENTAL DYSFUNCTION OF CERVICAL REGION: ICD-10-CM

## 2025-03-12 DIAGNOSIS — G44.86 CERVICOGENIC HEADACHE: ICD-10-CM

## 2025-03-12 PROCEDURE — 98941 CHIROPRACT MANJ 3-4 REGIONS: CPT | Performed by: CHIROPRACTOR

## 2025-03-12 RX ORDER — ENALAPRIL MALEATE 2.5 MG/1
2.5 TABLET ORAL DAILY
COMMUNITY

## 2025-03-12 RX ORDER — FLUCONAZOLE 150 MG/1
1 TABLET ORAL DAILY
COMMUNITY
Start: 2025-01-17

## 2025-03-12 NOTE — PROGRESS NOTES
"Assessment:  The patient is progressing better since his latest exacerbation of lower back symptoms.    1. Myofascial pain        2. Cervicogenic headache        3. Segmental dysfunction of cervical region        4. Segmental dysfunction of thoracic region        5. Sacroiliac joint dysfunction        6. Segmental dysfunction of lumbar region          Plan:  Treatment today consisted of myofascial release via Graston Technique to the bilateral suboccipital musculature, splenius capitis, and longissimus capitis (superficial).  GT 3 was used.  Myofascial release via ischemic compression was performed to the bilateral mid to lower thoracic erector spinae.    Manipulation was performed to the right sacroiliac joint via Samaniego low force drop technique. Contact was made to the right ischial tuberosity and the left lateral aspect of the  apex of the sacrum. Manipulation was performed to the lumbar restrictions via manual lumbar flexion distraction technique. Manipulation was performed to the lower cervical restrictions via gentle cervical traction technique.  Manipulation was performed to the upper cervical restrictions via supine stairstepping technique.  Manipulation was performed to the mid thoracic restrictions via grade 2 mobilization techniques and low force diversified maneuvers.  No high velocity thrust was applied.  An audible release occurred and was well-tolerated.    Subjective:  The patient reported his headaches and midline mid thoracic pain \"come and go.\"  He stated he is not experiencing a headache today but is aware of pain in the midline mid thoracic region.  He indicates that the midline mid thoracic symptoms feel like \"gas\" in the region of the spine.  He stated that he has not experienced the symptoms in the right lower back region that he described on the last chiropractic visit when he stated his right lower back \"locked.\"  The patient typically has symptoms in the bilateral suboccipital/posterior " "cervical region that cause headaches.  The patient Zaida that the majority of his pain is present in the midline mid thoracic region today.  The patient describes his pain level as a 4 on a 0-10 pain scale today.    Objective:  Negative Derefield on the right. Active myofascial trigger points were present in the bilateral rectus capitis posterior major and minor, bilateral longissimus capitis, superior trapezius, levator scapulae, and bilateral splenius capitis.  Active myofascial trigger points and tenderness were present in the bilateral mid to lower thoracic erector spinae.  Pressure to the mid to lower thoracic erector spinae trigger points reproduced the pain \"inside\" described in today's chief complaint.  Active myofascial trigger points were present in the right lumbar erector spinae, quadratus lumborum, and gluteus medius. Intersegmental motion was decreased in the cervical spine including joint dysfunctions at C1-C2 and C5-C6. Intersegmental motion was decreased in the thoracic spine including joint dysfunctions at T3-4, T6-7, T7-8, and T10-11. Intersegmental motion was decreased in the lumbar spine including joint dysfunctions at L2-3 and L3-4. Intersegmental motion was decreased in the right sacroiliac joint.        I have used the Epic copy/forward function to compose this note.  I have reviewed my current note to ensure it reflects the current patient status, exam, assessment and plan.    Dictation voice to text software has been used in the creation of this document. Please consider this in light of any contextual or grammatical errors.  "

## 2025-03-13 ENCOUNTER — TELEPHONE (OUTPATIENT)
Dept: OBGYN CLINIC | Facility: CLINIC | Age: 60
End: 2025-03-13

## 2025-03-13 ENCOUNTER — TELEPHONE (OUTPATIENT)
Dept: PAIN MEDICINE | Facility: CLINIC | Age: 60
End: 2025-03-13
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
## 2025-03-14 ENCOUNTER — PROCEDURE VISIT (OUTPATIENT)
Age: 60
End: 2025-03-14
Payer: COMMERCIAL

## 2025-03-14 VITALS
BODY MASS INDEX: 28.41 KG/M2 | HEART RATE: 88 BPM | HEIGHT: 67 IN | SYSTOLIC BLOOD PRESSURE: 132 MMHG | DIASTOLIC BLOOD PRESSURE: 74 MMHG | WEIGHT: 181 LBS | OXYGEN SATURATION: 98 %

## 2025-03-14 DIAGNOSIS — M99.03 SEGMENTAL DYSFUNCTION OF LUMBAR REGION: ICD-10-CM

## 2025-03-14 DIAGNOSIS — M79.18 MYOFASCIAL PAIN: Primary | ICD-10-CM

## 2025-03-14 DIAGNOSIS — G44.86 CERVICOGENIC HEADACHE: ICD-10-CM

## 2025-03-14 DIAGNOSIS — M99.01 SEGMENTAL DYSFUNCTION OF CERVICAL REGION: ICD-10-CM

## 2025-03-14 DIAGNOSIS — M53.3 SACROILIAC JOINT DYSFUNCTION: ICD-10-CM

## 2025-03-14 DIAGNOSIS — M99.02 SEGMENTAL DYSFUNCTION OF THORACIC REGION: ICD-10-CM

## 2025-03-14 PROCEDURE — 98941 CHIROPRACT MANJ 3-4 REGIONS: CPT | Performed by: CHIROPRACTOR

## 2025-03-14 PROCEDURE — 99213 OFFICE O/P EST LOW 20 MIN: CPT | Performed by: CHIROPRACTOR

## 2025-03-14 NOTE — PATIENT INSTRUCTIONS
The patient was advised to continue with the icing and stretching instructions.   The patient was advised to avoid prolonged sitting and take micro breaks every 30 minutes.
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
## 2025-03-18 ENCOUNTER — PROCEDURE VISIT (OUTPATIENT)
Age: 60
End: 2025-03-18
Payer: COMMERCIAL

## 2025-03-18 VITALS
HEIGHT: 67 IN | BODY MASS INDEX: 28.41 KG/M2 | OXYGEN SATURATION: 98 % | HEART RATE: 82 BPM | SYSTOLIC BLOOD PRESSURE: 130 MMHG | WEIGHT: 181 LBS | DIASTOLIC BLOOD PRESSURE: 68 MMHG

## 2025-03-18 DIAGNOSIS — M79.18 MYOFASCIAL PAIN: Primary | ICD-10-CM

## 2025-03-18 DIAGNOSIS — M53.3 SACROILIAC JOINT DYSFUNCTION: ICD-10-CM

## 2025-03-18 DIAGNOSIS — M99.02 SEGMENTAL DYSFUNCTION OF THORACIC REGION: ICD-10-CM

## 2025-03-18 DIAGNOSIS — M99.01 SEGMENTAL DYSFUNCTION OF CERVICAL REGION: ICD-10-CM

## 2025-03-18 DIAGNOSIS — G44.86 CERVICOGENIC HEADACHE: ICD-10-CM

## 2025-03-18 DIAGNOSIS — M99.03 SEGMENTAL DYSFUNCTION OF LUMBAR REGION: ICD-10-CM

## 2025-03-18 PROCEDURE — 99212 OFFICE O/P EST SF 10 MIN: CPT | Performed by: CHIROPRACTOR

## 2025-03-18 PROCEDURE — 98941 CHIROPRACT MANJ 3-4 REGIONS: CPT | Performed by: CHIROPRACTOR

## 2025-03-18 NOTE — PATIENT INSTRUCTIONS
The patient was advised to continue with the icing and stretching instructions.   Discontinue hamstring stretches.  The patient was advised to focus on improving gluteal flexibility by doing the gluteus medius/piriformis stretch frequently. The need to stretch to the point of tension only and not to the point of pain was emphasized. The patient was advised to do the stretch at 3 sets of 15 to 20 seconds several times per day.  The patient was informed that he may experience additional soreness following the today's treatment visit. The need to continue with the stretching exercises and apply ice in 15-minute intervals was discussed with the patient.

## 2025-03-19 NOTE — PROGRESS NOTES
Assessment:  The patient is indicating a new onset of right lateral lower extremity symptoms.  Pain along the right lateral lower extremity is associated with irritation to the right L5 nerve root.  The patient demonstrated positive nerve root tension findings on orthopedic testing today.  The patient had a reexamination on his last chiropractic visit (March 14, 2025) and did not have positive nerve root tension findings on orthopedic testing (in the seated and supine positions).  Due to the onset of right lateral lower extremity symptoms, positive nerve root tension findings on orthopedic testing, and persistent right lower back symptoms the patient was referred for an MRI of the lumbar spine.  The right L5 nerve root is in question.  The patient has an appointment scheduled with Navi oL DO (spine pain medicine) on March 27, 2025.  Hopefully the MRI of the lumbar spine is scheduled prior to the his appointment.  Other treatment options can be discussed.    1. Myofascial pain        2. Cervicogenic headache        3. Segmental dysfunction of cervical region        4. Segmental dysfunction of thoracic region        5. Sacroiliac joint dysfunction        6. Segmental dysfunction of lumbar region          This is a separate identifiable portion of today's visit from the E and M code billed.    Plan:  Treatment today consisted of myofascial release via ischemic compression was performed to the bilateral mid thoracic erector spinae.  Myofascial release via Graston technique was performed to the right anterior/lateral gluteus medius and tensor fascia nabila (superficial).  GT 3 and GT 4 were used.    Manipulation was performed to the right sacroiliac joint via grade 1 mobilization techniques in prone position.  No high velocity thrust was applied. Manipulation was performed to the lumbar restrictions via manual lumbar flexion distraction technique. Manipulation was performed to the lower cervical restrictions via gentle  "cervical traction technique.  Manipulation was performed to the upper cervical restrictions via supine stairstepping technique.    Subjective:  The patient reported that he went for a 6 block walk yesterday and stated he began to feel pain in the right lateral lower extremity that travel distal to the knee into the right lateral lower leg/ankle.  He stated he felt as though his right lower extremity was weak and he felt off balance.  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  He did not indicate that he is continuing to feel the more pronounced symptoms/pain in the in the cervicothoracic/midline upper to mid thoracic region that he described in the last chiropractic visit.  Once again he informed my assistant that the symptoms in the cervical/midline upper thoracic region are intermittently noted and \"comes and goes.\" The midline cervicothoracic/midline upper to mid thoracic symptoms vary with activity and can become more pronounced with cervical rotation at times.  The patient has decreased cervicothoracic/midline upper to mid thoracic symptoms/pain with chiropractic care. He has pain or paresthesias in the upper extremities he stated he experiences paresthesias in the bilateral hands as well as all the digits of the bilateral hands at night and early morning at times.  The patient described the cervicothoracic/midline upper to mid thoracic region as a 4 on a 0-10 pain scale today. The right lower back symptoms are randomly present and can occur 1-2 times per week.  The patient has difficulty with transitioning from seated to standing if he has been sitting for a prolonged period of time.  The patient typically has decreased symptoms in the right lower back/sacroiliac region symptoms with walking after prolonged sitting.  He has decreased symptoms in the right lower back/sacroiliac region with taking the prescribed muscle relaxant medication.   "   Objective:  Seated straight leg raise was positive on the right for right lateral/posterior lateral thigh and right lateral gluteal pain.  Braggard's test was positive for increased right lateral/posterior lateral thigh and right lateral gluteal pain.  Seated straight leg raise was negative on the left.    Negative [facility] on the right. Active myofascial trigger points were present in the bilateral rectus capitis posterior major and minor, bilateral longissimus capitis, superior trapezius, levator scapulae, and bilateral splenius capitis.  Active myofascial trigger points and tenderness were present in the bilateral mid to lower thoracic erector spinae.  Active myofascial trigger points were present in the right lumbar erector spinae, quadratus lumborum, and gluteus medius. Intersegmental motion was decreased in the cervical spine including joint dysfunctions at C1-C2 and C5-C6. Intersegmental motion was decreased in the thoracic spine including joint dysfunctions at T3-4, T6-7, T7-8, and T10-11. Intersegmental motion was decreased in the lumbar spine including joint dysfunctions at L2-3 and L3-4. Intersegmental motion was decreased in the right sacroiliac joint.         I have used the Epic copy/forward function to compose this note. I have reviewed my current note to ensure it reflects the current patient status, exam, assessment and plan.    Dictation voice to text software has been used in the creation of this document. Please consider this in light of any contextual or grammatical errors.

## 2025-03-21 ENCOUNTER — PROCEDURE VISIT (OUTPATIENT)
Age: 60
End: 2025-03-21
Payer: COMMERCIAL

## 2025-03-21 VITALS
HEART RATE: 87 BPM | HEIGHT: 67 IN | WEIGHT: 181 LBS | DIASTOLIC BLOOD PRESSURE: 72 MMHG | SYSTOLIC BLOOD PRESSURE: 140 MMHG | OXYGEN SATURATION: 98 % | BODY MASS INDEX: 28.41 KG/M2

## 2025-03-21 DIAGNOSIS — M99.03 SEGMENTAL DYSFUNCTION OF LUMBAR REGION: ICD-10-CM

## 2025-03-21 DIAGNOSIS — M99.01 SEGMENTAL DYSFUNCTION OF CERVICAL REGION: ICD-10-CM

## 2025-03-21 DIAGNOSIS — M53.3 SACROILIAC JOINT DYSFUNCTION: ICD-10-CM

## 2025-03-21 DIAGNOSIS — M99.02 SEGMENTAL DYSFUNCTION OF THORACIC REGION: ICD-10-CM

## 2025-03-21 DIAGNOSIS — M79.18 MYOFASCIAL PAIN: Primary | ICD-10-CM

## 2025-03-21 DIAGNOSIS — G44.86 CERVICOGENIC HEADACHE: ICD-10-CM

## 2025-03-21 PROCEDURE — 98941 CHIROPRACT MANJ 3-4 REGIONS: CPT | Performed by: CHIROPRACTOR

## 2025-03-21 NOTE — PROGRESS NOTES
Assessment:  The patient was advised to speak to his orthopedist to discuss other treatment options for the right shoulder besides surgery.   The patient is requesting massage to the mid thoracic region with manipulation which I have been doing during treatment visits.  I am not willing to perform osseous lumbar or pelvic manipulation without an MRI of the lumbar spine particularly since the patient described right lower extremity symptoms.  The patient has been referred for an MRI of the lumbar spine to evaluate the status of the right L5 nerve root.  I have also not been performing manipulation of the cervical spine as the patient had elevated blood pressure until recently.    1. Myofascial pain        2. Cervicogenic headache        3. Segmental dysfunction of cervical region        4. Segmental dysfunction of thoracic region        5. Sacroiliac joint dysfunction        6. Segmental dysfunction of lumbar region          Plan:  Treatment today consisted of myofascial release via ischemic compression was performed to the bilateral mid thoracic erector spinae, right lumbar to spinae, requisition warm, right gluteus medius including the right lateral gluteus medius.    Manipulation was performed to the right sacroiliac joint via grade 1 mobilization techniques in prone position.  No high velocity thrust was applied. Manipulation was performed to the lumbar restrictions via manual lumbar flexion distraction technique. Manipulation was performed to the lower cervical restrictions via gentle cervical traction technique.  Manipulation was performed to the upper cervical restrictions via supine stairstepping technique.Manipulation was performed to the mid thoracic restrictions via grade 2 mobilization techniques and low force diversified maneuvers.  No high velocity thrust was applied.  An audible release occurred and was well-tolerated.    PNF stretching was performed to the right gluteus medius and  piriformis.    Subjective:  The patient reported feeling better when compared to the last chiropractic visit as he is no longer experiencing pain in the right lateral lower extremity.  He points to the the right lower back and right superior/superior lateral gluteal region as the location of pain.  He also indicated that he continues to experience stiffness and discomfort in the mid thoracic region.  The patient stated he was speaking to his private physician and was advised to seek a chiropractor with massage therapy who can crack the middle of his back as the physician feels he needs to gain flexibility in his spine.  He questioned whether the persistent pain in his right shoulder is causing him to continue to experience intermittent pain in the midline upper to mid thoracic region.  The patient was questioned if he has any follow-up appointment with the orthopedist.  He stated that he does not have a follow-up appointment with the orthopedist.  The patient reported that surgery was suggested as a treatment option but the patient did not want to have the surgery to the right shoulder.  When asked the patient reported that he did not discussed the possibility of physical therapy with the orthopedist.  The midline cervicothoracic/midline upper to mid thoracic symptoms vary with activity and can become more pronounced with cervical rotation at times.  The patient has decreased cervicothoracic/midline upper to mid thoracic symptoms/pain with chiropractic care. He has pain or paresthesias in the upper extremities he stated he experiences paresthesias in the bilateral hands as well as all the digits of the bilateral hands at night and early morning at times.  The patient described the cervicothoracic/midline upper to mid thoracic region as a 4 or 5 on a 0-10 pain scale today.  The patient typically has decreased symptoms in the right lower back/sacroiliac region symptoms with walking after prolonged sitting.  He has  decreased symptoms in the right lower back/sacroiliac region with taking the prescribed muscle relaxant medication.  The patient informed my assistant that his pain level for the right shoulder is a 3 or 4 on a 0-10 pain scale.  However he described his pain level in the right shoulder as a 7 on a 0-10 pain scale with raising his right upper extremity.    Objective:  Negative Derefield on the right. Active myofascial trigger points were present in the bilateral superior trapezius, levator scapulae, and bilateral splenius capitis.  Active myofascial trigger points and less tenderness were present in the bilateral mid thoracic erector spinae.  Active myofascial trigger points were present in the right lumbar erector spinae, quadratus lumborum, and gluteus medius including the right lateral gluteus medius. Intersegmental motion was decreased in the cervical spine including joint dysfunctions at C1-C2 and C5-C6. Intersegmental motion was decreased in the thoracic spine including joint dysfunctions at T3-4, T6-7, T7-8, and T10-11. Intersegmental motion was decreased in the lumbar spine including joint dysfunctions at L2-3 and L3-4. Intersegmental motion was decreased in the right sacroiliac joint.         I have used the Epic copy/forward function to compose this note. I have reviewed my current note to ensure it reflects the current patient status, exam, assessment and plan.    Dictation voice to text software has been used in the creation of this document. Please consider this in light of any contextual or grammatical errors.

## 2025-03-25 ENCOUNTER — PROCEDURE VISIT (OUTPATIENT)
Age: 60
End: 2025-03-25
Payer: COMMERCIAL

## 2025-03-25 VITALS
OXYGEN SATURATION: 99 % | HEIGHT: 67 IN | WEIGHT: 181 LBS | SYSTOLIC BLOOD PRESSURE: 128 MMHG | HEART RATE: 81 BPM | DIASTOLIC BLOOD PRESSURE: 70 MMHG | BODY MASS INDEX: 28.41 KG/M2

## 2025-03-25 DIAGNOSIS — M99.02 SEGMENTAL DYSFUNCTION OF THORACIC REGION: ICD-10-CM

## 2025-03-25 DIAGNOSIS — M99.03 SEGMENTAL DYSFUNCTION OF LUMBAR REGION: ICD-10-CM

## 2025-03-25 DIAGNOSIS — M79.18 MYOFASCIAL PAIN: Primary | ICD-10-CM

## 2025-03-25 DIAGNOSIS — M53.3 SACROILIAC JOINT DYSFUNCTION: ICD-10-CM

## 2025-03-25 DIAGNOSIS — M99.01 SEGMENTAL DYSFUNCTION OF CERVICAL REGION: ICD-10-CM

## 2025-03-25 DIAGNOSIS — G44.86 CERVICOGENIC HEADACHE: ICD-10-CM

## 2025-03-25 PROCEDURE — 98941 CHIROPRACT MANJ 3-4 REGIONS: CPT | Performed by: CHIROPRACTOR

## 2025-03-25 RX ORDER — AMLODIPINE BESYLATE 10 MG/1
10 TABLET ORAL DAILY
COMMUNITY
Start: 2025-03-01

## 2025-03-26 NOTE — PROGRESS NOTES
Assessment:  Once again the patient was advised to speak to his orthopedist to discuss other treatment options for the right shoulder as the patient is reluctant to do surgery.  He was informed that the physician that he is seeing on Friday is a spine and pain medicine physician and is not the same physician who evaluated his right shoulder.  He was encouraged to schedule with Isma Alexandre DO (orthopedic) for further evaluation and to discuss treatment options for his right shoulder.  During the patient's January 21, 2025 chiropractic reevaluation he indicated that he had been experiencing pain in the right lower back region only 1 time per month while he was involved in certain activities.  Since then his right lower back symptoms have become more frequent and he had an episode of pain and weakness in the right lower extremity.  As a result the patient has been referred for an MRI of the lumbar spine but has not had the study performed yet.  I have asked the patient if there is any change in his daily activities that may be responsible for the worsening of his lumbar symptoms over the past 2 months.  The patient is unable to provide an answer as to why his back pain is more pronounced and more frequent.  He only describes activities in which he feels the pain but acknowledges that he had been doing similar physical activity in recent months when his lower back pain was less frequent and less pronounced.  Therefore the patient is reaching a plateau with chiropractic care.  He has an appointment scheduled with Navi Lo DO (spine and pain medicine) on Friday.  Other treatment options will be discussed.    1. Myofascial pain        2. Cervicogenic headache        3. Segmental dysfunction of cervical region        4. Segmental dysfunction of thoracic region        5. Sacroiliac joint dysfunction        6. Segmental dysfunction of lumbar region          Plan:  Treatment today consisted of myofascial release via  "ischemic compression was performed to the bilateral mid thoracic erector spinae, right lumbar erector spinae, right quadratus lumborum, and right gluteus medius. Myofascial release via Graston Technique to the bilateral suboccipital musculature, splenius capitis, and longissimus capitis (superficial).  GT 3 was used.    Manipulation was performed to the right sacroiliac joint via grade 1 mobilization techniques in prone position.  No high velocity thrust was applied. Manipulation was performed to the lumbar restrictions via manual lumbar flexion distraction technique. Manipulation was performed to the lower cervical restrictions via gentle cervical traction technique.  Manipulation was performed to the upper cervical restrictions via supine stairstepping technique. Manipulation was performed to the mid thoracic restrictions via grade 1 mobilization techniques in the prone position.  No high velocity thrust was applied.      PNF stretching was performed to the right gluteus medius and piriformis.    Subjective:  The patient reported feeling a \"shooting pain up my back\" when he was working on a sink today.  He stated he was sitting on a stool while working under a sink and had pain in the lower back that radiated along the midline spine superiorly into the midline upper to mid thoracic spine.  The patient denied experiencing pain in the right lateral lower extremity since that 1 episode when he was walking.  He did not indicate experiencing pain/symptoms in the right superior/superior lateral gluteal region as he described on last chiropractic visit.  The patient was question whether he feels relief with the treatment to the mid thoracic region performed on recent chiropractic visits as he stated he felt as though he needed massage and manipulation to the thoracic region on the last chiropractic visit.  The patient reported that he has relief with that form of treatment to the thoracic region but stated that it does " not completely resolve with only doing that 1 time.  Although he acknowledges that I have performed manipulation and myofascial release to the region on earlier visits.  The patient reported that he did not communicate with the orthopedic and did not schedule another orthopedic evaluation for his right shoulder condition.  The patient stated that he thought the appointment he has on Friday (Navi Lo DO) was for the right shoulder. The midline cervicothoracic/midline upper to mid thoracic symptoms vary with activity and can become more pronounced with cervical rotation at times.  The patient has decreased cervicothoracic/midline upper to mid thoracic symptoms/pain with chiropractic care. The patient described the cervicothoracic/midline upper to mid thoracic region as a 4 on a 0-10 pain scale today.  The patient typically has decreased symptoms in the right lower back/sacroiliac region symptoms with walking after prolonged sitting.  He has decreased symptoms in the right lower back/sacroiliac region with taking the prescribed muscle relaxant medication.  The patient described his back pain as a 5 on a 0-10 pain scale today.  The patient informed my assistant that his pain level for the right shoulder has a 4 on a 0-10 pain scale.  The patient has increased pain in the right shoulder with raising his right upper extremity.    Objective:  Negative Derefield on the right. Active myofascial trigger points were present in the bilateral superior trapezius, levator scapulae, and bilateral splenius capitis.  Active myofascial trigger points were present in the bilateral mid thoracic erector spinae.  Active myofascial trigger points were present in the right lumbar erector spinae, quadratus lumborum, and gluteus medius.  Intersegmental motion was decreased in the cervical spine including joint dysfunctions at C1-C2 and C5-C6. Intersegmental motion was decreased in the thoracic spine including joint dysfunctions at T3-4,  T7-8, and T10-11. Intersegmental motion was decreased in the lumbar spine including joint dysfunctions at L2-3 and L5-S1. Intersegmental motion was decreased in the right sacroiliac joint.         I have used the Epic copy/forward function to compose this note. I have reviewed my current note to ensure it reflects the current patient status, exam, assessment and plan.    Dictation voice to text software has been used in the creation of this document. Please consider this in light of any contextual or grammatical errors.

## 2025-03-27 ENCOUNTER — TELEPHONE (OUTPATIENT)
Dept: PAIN MEDICINE | Facility: CLINIC | Age: 60
End: 2025-03-27

## 2025-03-27 NOTE — TELEPHONE ENCOUNTER
"Patient missed his appt today at 11:00.  I called him (new no show policy requirement) at 11:20 and he said his appt was at 11:15.  He was reminded that he was a no show for 03/14/25 appt and since he is now 20 min late we would need to reschedule.  He is a new consult. He stated \"why, when I come there no one is waiting, the doctor isn't even busy\".  \"Now you tell me he can't see me.\"  I  explained [facility] policy is 15 min past appt.  He rescheduled for 04/11/25.   "

## 2025-03-28 ENCOUNTER — PROCEDURE VISIT (OUTPATIENT)
Age: 60
End: 2025-03-28
Payer: COMMERCIAL

## 2025-03-28 VITALS
OXYGEN SATURATION: 97 % | HEART RATE: 88 BPM | HEIGHT: 67 IN | BODY MASS INDEX: 28.41 KG/M2 | SYSTOLIC BLOOD PRESSURE: 122 MMHG | DIASTOLIC BLOOD PRESSURE: 60 MMHG | WEIGHT: 181 LBS

## 2025-03-28 DIAGNOSIS — G44.86 CERVICOGENIC HEADACHE: ICD-10-CM

## 2025-03-28 DIAGNOSIS — M99.01 SEGMENTAL DYSFUNCTION OF CERVICAL REGION: ICD-10-CM

## 2025-03-28 DIAGNOSIS — M99.02 SEGMENTAL DYSFUNCTION OF THORACIC REGION: ICD-10-CM

## 2025-03-28 DIAGNOSIS — M99.03 SEGMENTAL DYSFUNCTION OF LUMBAR REGION: ICD-10-CM

## 2025-03-28 DIAGNOSIS — M79.18 MYOFASCIAL PAIN: Primary | ICD-10-CM

## 2025-03-28 DIAGNOSIS — M53.3 SACROILIAC JOINT DYSFUNCTION: ICD-10-CM

## 2025-03-28 PROCEDURE — 98941 CHIROPRACT MANJ 3-4 REGIONS: CPT | Performed by: CHIROPRACTOR

## 2025-03-28 NOTE — PROGRESS NOTES
"Assessment:  The patient is indicating having continued pain in the midline mid to upper thoracic region.  It is questionable whether the patient is compensating in the mid thoracic region for the limited range of motion and pain in his right shoulder.  Nonetheless he was referred for an MRI of the thoracic spine due to the persistent pain and lack of improvement with conservative care.  The MRI of the thoracic spine is to evaluate for herniated nucleus pulposus.  If the patient is unable to get in contact with someone to schedule an orthopedic appointment, the patient was advised to use the [employer] diogo on his phone to communicate or schedule an appointment with Dr. Alexandre (orthopedist) to further evaluate the right shoulder.  The patient indicated he understood.  I had communicated with Navi Lo DO (spine pain medicine) via the epic secure chat diogo letting Dr. Lo know of the patient's recent plateau in progress and the need for other treatment options.  However Dr. Lo informed me the patient was \"extremely late for his appointment and had to be rescheduled.\"  Therefore I will continue to treat the patient with conservative chiropractic care next week since the patient has no other treatment option at this time.  Other treatment options will be discussed with Dr. Lo on his spine pain medicine appointment on April 11, 2025.  Once again the patient was advised to remove wallet from back pocket when sitting.  Although the patient stated he has removed the papers to make it not as thick it is still quite thick and may cause elevation of his right pelvis with sitting causing subsequent shortening of the lumbar and pelvic musculature.  This can potentially cause him to have more pronounced pain with transitioning from seated to standing.  He indicated he understood.    1. Myofascial pain        2. Cervicogenic headache        3. Segmental dysfunction of cervical region        4. Segmental dysfunction of " "thoracic region        5. Sacroiliac joint dysfunction        6. Segmental dysfunction of lumbar region          Plan:  Treatment today consisted of myofascial release via ischemic compression was performed to the bilateral mid thoracic erector spinae, right lumbar erector spinae, right quadratus lumborum, and right gluteus medius.    Manipulation was performed to the right sacroiliac joint via grade 1 mobilization techniques in prone position.  No high velocity thrust was applied. Manipulation was performed to the lumbar restrictions via manual lumbar flexion distraction technique. Manipulation was performed to the lower cervical restrictions via gentle cervical traction technique.  Manipulation was performed to the upper cervical restrictions via supine stairstepping technique.     PNF stretching was performed to the right gluteus medius and piriformis.    Subjective:  The patient reported feeling \"much better\" in the right lower back region when compared to last chiropractic visit.  He stated he is only aware of the pain in the lumbar region and superiorly while working under a sink as he indicated on the last chiropractic visit.  However the patient indicated having significant pain in the midline thoracic region today.  He stated the thoracic back pain feels like it is caused by his right shoulder as he feels \"pulling\" in the region. The patient reported that he tried to contact the orthopedic office to schedule an appointment but there was no answer and the line cut off.  The patient reported that he typically speaks to somebody when he calls for the orthopedic appointments.  The patient acknowledges that he was late for the appointment with Navi Lo DO but stated that he was only 5 minutes late and needed to reschedule.  The patient reported to my assistant that the the midline cervicothoracic/midline upper to mid thoracic symptoms is only present with cervical rotation.  The patient has decreased " cervicothoracic/midline upper to mid thoracic symptoms/pain with chiropractic care. The patient described the pain as a 1 or 2 on a 0-10 pain scale today.  He described the midline upper to mid thoracic region as a 7 on a 0-10 pain scale today.  The patient typically has decreased symptoms in the right lower back/sacroiliac region symptoms with walking after prolonged sitting.  He has decreased symptoms in the right lower back/sacroiliac region with taking the prescribed muscle relaxant medication.  The patient informed my assistant that his pain level for the right shoulder has a 4 or 5 on a 0-10 pain scale.  The patient has increased pain in the right shoulder with raising his right upper extremity.    Objective:  Once again, upon inspection, the patient has a thick wallet placed in his right back pocket.    Negative Derefield on the right. Active myofascial trigger points were present in the bilateral superior trapezius, levator scapulae, and bilateral splenius capitis.  Active myofascial trigger points were present in the bilateral mid thoracic erector spinae.  Active myofascial trigger points were present in the right lumbar erector spinae, quadratus lumborum, and gluteus medius.  Intersegmental motion was decreased in the cervical spine including joint dysfunctions at C1-C2 and C5-C6. Intersegmental motion was decreased in the thoracic spine including joint dysfunctions at T3-4, T7-8, and T10-11. Intersegmental motion was decreased in the lumbar spine including joint dysfunctions at L2-3 and L3-4. Intersegmental motion was decreased in the right sacroiliac joint.         I have used the Epic copy/forward function to compose this note. I have reviewed my current note to ensure it reflects the current patient status, exam, assessment and plan.    Dictation voice to text software has been used in the creation of this document. Please consider this in light of any contextual or grammatical errors.

## 2025-03-28 NOTE — PATIENT INSTRUCTIONS
The patient was advised to continue with the icing and stretching instructions.   Remove wallet from back pocket when sitting.

## 2025-04-01 ENCOUNTER — HOSPITAL ENCOUNTER (OUTPATIENT)
Dept: MRI IMAGING | Facility: HOSPITAL | Age: 60
Discharge: HOME | End: 2025-04-01
Payer: COMMERCIAL

## 2025-04-01 DIAGNOSIS — M79.18 MYOFASCIAL PAIN: ICD-10-CM

## 2025-04-01 PROCEDURE — 72148 MRI LUMBAR SPINE W/O DYE: CPT

## 2025-04-02 ENCOUNTER — PROCEDURE VISIT (OUTPATIENT)
Age: 60
End: 2025-04-02
Payer: COMMERCIAL

## 2025-04-02 VITALS — HEIGHT: 67 IN | WEIGHT: 181 LBS | BODY MASS INDEX: 28.41 KG/M2

## 2025-04-02 DIAGNOSIS — M53.3 SACROILIAC JOINT DYSFUNCTION: ICD-10-CM

## 2025-04-02 DIAGNOSIS — M99.03 SEGMENTAL DYSFUNCTION OF LUMBAR REGION: ICD-10-CM

## 2025-04-02 DIAGNOSIS — M79.18 MYOFASCIAL PAIN: Primary | ICD-10-CM

## 2025-04-02 DIAGNOSIS — M99.01 SEGMENTAL DYSFUNCTION OF CERVICAL REGION: ICD-10-CM

## 2025-04-02 DIAGNOSIS — M99.02 SEGMENTAL DYSFUNCTION OF THORACIC REGION: ICD-10-CM

## 2025-04-02 DIAGNOSIS — G44.86 CERVICOGENIC HEADACHE: ICD-10-CM

## 2025-04-02 PROCEDURE — 98941 CHIROPRACT MANJ 3-4 REGIONS: CPT | Performed by: CHIROPRACTOR

## 2025-04-02 NOTE — PATIENT INSTRUCTIONS
Schedule with Dr. Lucero to evaluate right renal cyst findings on MRI of the lumbar spine report.  The patient was advised to continue with the icing and stretching instructions.

## 2025-04-02 NOTE — PROGRESS NOTES
Assessment:  The patient is MRI report of the lumbar spine was reviewed prior to chiropractic treatment today.  The MRI report was also reviewed with the patient today.    Narrative & Impression  MRI LUMBAR SPINE WITHOUT CONTRAST     INDICATION: M79.18: Myalgia, other site.     COMPARISON: X-ray of the lumbar spine from September 16, 2024     TECHNIQUE:  Multiplanar, multisequence imaging of the lumbar spine was performed. .  Imaging performed on 1.5T MRI     IMAGE QUALITY:  Diagnostic     FINDINGS:     VERTEBRAL BODIES:  There are 5 lumbar type vertebral bodies.  Normal alignment of the lumbar spine.  No spondylolysis or spondylolisthesis. No scoliosis.  No compression fracture.    Type II Modic endplate degenerative signal at L4-L5 with small   Schmorl's node defects. No suspicious marrow signal abnormality.     SACRUM:  Normal signal within the sacrum. No evidence of insufficiency or stress fracture.     DISTAL CORD AND CONUS:  Normal size and signal within the distal cord and conus.     PARASPINAL SOFT TISSUES:  Paraspinal soft tissues are unremarkable.     LOWER THORACIC DISC SPACES:  Normal disc height and signal.  No disc herniation, canal stenosis or foraminal narrowing.     LUMBAR DISC SPACES: Scattered Schmorl's node deformities. No significant disc height loss.     L1-L2:  Normal.     L2-L3:  Normal.     L3-L4:  Normal.     L4-L5: Anterior, far lateral and right foraminal disc bulging. Mild facet arthropathy. No spinal canal or foraminal stenosis.     L5-S1: Mild disc bulging and left facet arthropathy. No spinal canal or foraminal stenosis.     OTHER FINDINGS: Approximate 1 cm simple appearing cyst arising from the inferior in medial pole of the right kidney (301:12).     IMPRESSION:     Mild noncompressive degenerative discogenic disease in the lower lumbar spine.        Workstation performed: [id number]     The patient was referred to Dorys Lucero MD (General, family medicine) for further  "evaluation of the \"1 cm simple appearing cyst arising from the inferior pole and medial pole of the right kidney.\"    The patient had symptoms along the course of the right L5 nerve root which prompted the MRI of the lumbar spine.  No spinal canal or neuroforaminal narrowing is noted at the L4-5 or L5-S1 levels.  Although the patient does have an \"Anterior, far lateral and right foraminal disc bulging\" noted at the L4-5 level.  It is questionable whether the patient had an inflammatory process at that level that may have contributed to some irritation to the right L5 nerve root causing the patient's right lateral lower extremity symptoms during that episode.    1. Myofascial pain        2. Cervicogenic headache        3. Segmental dysfunction of cervical region        4. Segmental dysfunction of thoracic region        5. Sacroiliac joint dysfunction        6. Segmental dysfunction of lumbar region          Plan:  Treatment today consisted of myofascial release via ischemic compression was performed to the bilateral mid thoracic erector spinae, right lumbar erector spinae and right quadratus lumborum.    Manipulation was performed to the right sacroiliac joint via grade 1 mobilization techniques in prone position.  No high velocity thrust was applied. Manipulation was performed to the lumbar restrictions via manual lumbar flexion distraction technique. Manipulation was performed to the lower cervical restrictions via gentle cervical traction technique.  Manipulation was performed to the upper cervical restrictions via supine stairstepping technique.     Subjective:  The patient reported feeling \"much better\" in the midline/medial mid to upper thoracic region when compared to last chiropractic visit.  Although he stated he has not been doing physical work and is unsure if that is why the area is feeling better.  He also reported that though lower back region is feeling better.  He stated that he continues to " "experience pain in the right shoulder.  He stated that he called to make an appointment with Dr. Alexandre for reevaluation of the right shoulder and was told that either his primary care physician or I had to refer him prior to him having a reevaluation of his right shoulder.  The patient stated that he had explained that he was an existing patient but he was told that I needed to make a referral.  The patient was reported feeling decreased symptoms in the cervical region and stated that his headaches are \"much better\" since his blood pressure was under control.  Although on his chiropractic reevaluation the patient has decreased cervicothoracic/midline upper to mid thoracic symptoms/pain with chiropractic care.  The patient describes his back pain as a 4 on a 0-10 pain scale today.  The patient typically has decreased symptoms in the right lower back/sacroiliac region symptoms with walking after prolonged sitting.  He has decreased symptoms in the right lower back/sacroiliac region with taking the prescribed muscle relaxant medication.    Objective:  Negative Derefield on the right. Active myofascial trigger points were present in the bilateral superior trapezius, levator scapulae, and bilateral splenius capitis.  Active myofascial trigger points were present in the bilateral mid thoracic erector spinae.  Active myofascial trigger points were present in the right lumbar erector spinae, quadratus lumborum, and gluteus medius.  Intersegmental motion was decreased in the cervical spine including joint dysfunctions at C1-C2 and C5-C6. Intersegmental motion was decreased in the thoracic spine including joint dysfunctions at T3-4, T7-8, and T10-11. Intersegmental motion was decreased in the lumbar spine including joint dysfunctions at L2-3 and L5-S1. Intersegmental motion was decreased in the right sacroiliac joint.         I have used the Epic copy/forward function to compose this note. I have reviewed my current note to " ensure it reflects the current patient status, exam, assessment and plan.    Dictation voice to text software has been used in the creation of this document. Please consider this in light of any contextual or grammatical errors.

## 2025-04-11 ENCOUNTER — OFFICE VISIT (OUTPATIENT)
Dept: PAIN MEDICINE | Facility: CLINIC | Age: 60
End: 2025-04-11
Payer: COMMERCIAL

## 2025-04-11 ENCOUNTER — TELEPHONE (OUTPATIENT)
Age: 60
End: 2025-04-11

## 2025-04-11 VITALS — WEIGHT: 180 LBS | BODY MASS INDEX: 28.19 KG/M2

## 2025-04-11 DIAGNOSIS — M75.22 BICEPS TENDINITIS OF LEFT UPPER EXTREMITY: ICD-10-CM

## 2025-04-11 DIAGNOSIS — M54.9 MID BACK PAIN: ICD-10-CM
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
## 2025-04-11 DIAGNOSIS — M75.21 BICEPS TENDINITIS OF RIGHT UPPER EXTREMITY: ICD-10-CM

## 2025-04-11 DIAGNOSIS — M79.18 MYOFASCIAL PAIN: ICD-10-CM

## 2025-04-11 DIAGNOSIS — M51.26 LUMBAR DISC HERNIATION: ICD-10-CM

## 2025-04-11 PROCEDURE — 99204 OFFICE O/P NEW MOD 45 MIN: CPT | Performed by: PHYSICAL MEDICINE & REHABILITATION
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
## 2025-04-11 NOTE — TELEPHONE ENCOUNTER
Patient called to confirm building I advised we were located in [facility] in the 2nd floor.

## 2025-04-15 ENCOUNTER — TELEPHONE (OUTPATIENT)
Age: 60
End: 2025-04-15

## 2025-04-15 NOTE — TELEPHONE ENCOUNTER
Caller: Patient    Doctor: Dr. DEAN    Reason for call: Would like to know if 4/17 procedure needs to be reschedule    Call back#:

## 2025-04-17 ENCOUNTER — PROCEDURE VISIT (OUTPATIENT)
Age: 60
End: 2025-04-17
Payer: COMMERCIAL

## 2025-04-17 VITALS — WEIGHT: 180 LBS | BODY MASS INDEX: 28.25 KG/M2 | HEIGHT: 67 IN

## 2025-04-17 DIAGNOSIS — M99.03 SEGMENTAL DYSFUNCTION OF LUMBAR REGION: ICD-10-CM

## 2025-04-17 DIAGNOSIS — M99.02 SEGMENTAL DYSFUNCTION OF THORACIC REGION: ICD-10-CM

## 2025-04-17 DIAGNOSIS — G44.86 CERVICOGENIC HEADACHE: ICD-10-CM

## 2025-04-17 DIAGNOSIS — M79.18 MYOFASCIAL PAIN: Primary | ICD-10-CM

## 2025-04-17 DIAGNOSIS — M99.01 SEGMENTAL DYSFUNCTION OF CERVICAL REGION: ICD-10-CM

## 2025-04-17 DIAGNOSIS — M53.3 SACROILIAC JOINT DYSFUNCTION: ICD-10-CM

## 2025-04-17 PROCEDURE — 97140 MANUAL THERAPY 1/> REGIONS: CPT | Performed by: CHIROPRACTOR

## 2025-04-17 PROCEDURE — 98940 CHIROPRACT MANJ 1-2 REGIONS: CPT | Performed by: CHIROPRACTOR

## 2025-04-17 NOTE — PROGRESS NOTES
"Assessment:  The patient is scheduled for a lumbar epidural steroid injection.  In regards to the lumbar condition the patient has not been improving with the provided chiropractic care.  Therefore the focus of chiropractic care will be on the cervical/upper thoracic region as he has reported improvement in this region with chiropractic care.  The patient has limited range of motion of the right shoulder and therefore is prone to compensation in the right cervical/upper thoracic musculature.  Therefore I resumed Graston Technique to the upper thoracic region today. The patient was informed that he may experience additional soreness following the today's treatment visit. The need to continue to apply ice in 15-minute intervals was discussed with the patient.    1. Myofascial pain        2. Cervicogenic headache        3. Segmental dysfunction of cervical region        4. Segmental dysfunction of thoracic region        5. Sacroiliac joint dysfunction        6. Segmental dysfunction of lumbar region          Plan:  Treatment today consisted of myofascial release via Graston Technique to the bilateral upper thoracic musculature including the levator scapula at the superior angle of the scapula (superficial). GT 3, GT 4, and GT 5 were used. At least 8 minutes of myofascial release was performed.    Manipulation was performed to the lower cervical restrictions via gentle cervical traction technique.      Subjective:  The patient reported feeling \"much better\" in the cervical/upper thoracic region.  The patient confirmed that he had an evaluation with Navi Lo DO (spine pain medicine) and was scheduled for a lumbar epidural steroid injection.  The patient also reported that he was scheduled for a right shoulder injection today but the appointment was canceled because the provider will not be in the office today.  He reported that that appointment is scheduled for a later date.  He also reported that the MRI of the " thoracic spine was canceled by the imaging department and rescheduled.  He stated he continues to have increased pain in the right shoulder with raising his right upper extremity.  He also reported that he has a tendency to drop items such as a drill when his right upper extremity is extended in front of him.  He stated he continues to experience pain in the midline thoracolumbar/lumbar region.  He stated that his headaches are have not been present in a while. The patient describes his back pain as a 4 or 5 on a 0-10 pain scale today.  He described his right shoulder pain as a 4 on a 0-10 pain scale today but stated it worsens with activity.  The patient typically has decreased symptoms in the right lower back/sacroiliac region symptoms with walking after prolonged sitting.  He has decreased symptoms in the right lower back/sacroiliac region with taking the prescribed muscle relaxant medication.    Objective:  Negative Derefield on the right. Active myofascial trigger points were present in the bilateral superior trapezius, levator scapulae, and bilateral splenius capitis. Intersegmental motion was decreased in the cervical spine including joint dysfunctions at C1-C2 and C5-C6. Intersegmental motion was decreased in the thoracic spine including joint dysfunctions at T3-4 and T7-8.        I have used the Epic copy/forward function to compose this note. I have reviewed my current note to ensure it reflects the current patient status, exam, assessment and plan.    Dictation voice to text software has been used in the creation of this document. Please consider this in light of any contextual or grammatical errors.

## 2025-04-17 NOTE — PATIENT INSTRUCTIONS
The patient was informed that he may experience additional soreness following the today's treatment visit. The need to continue with the stretching exercises and apply ice in 15-minute intervals was discussed with the patient.   The patient was advised to apply ice as needed.

## 2025-04-23 ENCOUNTER — PROCEDURE VISIT (OUTPATIENT)
Age: 60
End: 2025-04-23
Payer: COMMERCIAL

## 2025-04-23 VITALS — WEIGHT: 176 LBS | HEIGHT: 67 IN | BODY MASS INDEX: 27.62 KG/M2

## 2025-04-23 DIAGNOSIS — M99.03 SEGMENTAL DYSFUNCTION OF LUMBAR REGION: ICD-10-CM

## 2025-04-23 DIAGNOSIS — G44.86 CERVICOGENIC HEADACHE: ICD-10-CM

## 2025-04-23 DIAGNOSIS — M53.3 SACROILIAC JOINT DYSFUNCTION: ICD-10-CM

## 2025-04-23 DIAGNOSIS — M79.18 MYOFASCIAL PAIN: Primary | ICD-10-CM

## 2025-04-23 DIAGNOSIS — M99.01 SEGMENTAL DYSFUNCTION OF CERVICAL REGION: ICD-10-CM

## 2025-04-23 DIAGNOSIS — M99.02 SEGMENTAL DYSFUNCTION OF THORACIC REGION: ICD-10-CM

## 2025-04-23 PROCEDURE — 97140 MANUAL THERAPY 1/> REGIONS: CPT | Performed by: CHIROPRACTOR

## 2025-04-23 PROCEDURE — 98940 CHIROPRACT MANJ 1-2 REGIONS: CPT | Performed by: CHIROPRACTOR

## 2025-04-23 NOTE — PROGRESS NOTES
"Assessment:    1. Myofascial pain        2. Cervicogenic headache        3. Segmental dysfunction of cervical region        4. Segmental dysfunction of thoracic region        5. Sacroiliac joint dysfunction        6. Segmental dysfunction of lumbar region          Plan:  Treatment today consisted of myofascial release via Graston Technique to the right upper thoracic musculature including the levator scapula at the superior angle of the scapula.  GT 3, GT 4, and GT 5 were used.  At least 8 minutes of myofascial release was performed.    Manipulation was performed to the lower cervical restrictions via gentle cervical traction technique.      Subjective:  Once again the patient reported feeling \"much better\" in the cervical/upper thoracic region.  He indicated that he felt relief from the right upper thoracic region since the last chiropractic treatment visit/Graston Technique.  The patient confirmed that he has an injection of the right shoulder (bicipital sheath) scheduled with Navi Lo DO (spine pain medicine) scheduled for tomorrow.  The patient reported that he is most aware of pain in the lumbar region and confirmed that he will be getting a lumbar epidural steroid injection to address this.  The patient has increased pain in the right shoulder with raising his right upper extremity.  Once again he reported that he has a tendency to drop items such as a drill when his right upper extremity is extended in front of him.  The patient describes his pain level as a 4 on a 0-10 pain scale today.    Objective:  Negative Derefield on the right. Active myofascial trigger points were present in the bilateral superior trapezius, levator scapulae, and bilateral splenius capitis. Intersegmental motion was decreased in the cervical spine including joint dysfunctions at C1-C2 and C5-C6. Intersegmental motion was decreased in the thoracic spine including joint dysfunctions at T3-4 and T7-8.        I have used the Epic " copy/forward function to compose this note. I have reviewed my current note to ensure it reflects the current patient status, exam, assessment and plan.    Dictation voice to text software has been used in the creation of this document. Please consider this in light of any contextual or grammatical errors.

## 2025-05-13 ENCOUNTER — TELEPHONE (OUTPATIENT)
Dept: PAIN MEDICINE | Facility: CLINIC | Age: 60
End: 2025-05-13
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
## 2025-05-14 ENCOUNTER — TELEPHONE (OUTPATIENT)
Dept: PAIN MEDICINE | Facility: CLINIC | Age: 60
End: 2025-05-14
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
## 2025-05-14 DIAGNOSIS — M79.18 MYOFASCIAL PAIN SYNDROME: ICD-10-CM

## 2025-05-14 DIAGNOSIS — M51.26 LUMBAR DISC HERNIATION: ICD-10-CM

## 2025-05-14 NOTE — TELEPHONE ENCOUNTER
Returned call to patient regarding PT.  LM on vm, PT script in Epic.  He can call and schedule at [facility], [phone].  Also emailed the information.

## 2025-05-23 ENCOUNTER — TELEPHONE (OUTPATIENT)
Age: 60
End: 2025-05-23

## 2025-05-23 NOTE — TELEPHONE ENCOUNTER
Caller: Asaf     Doctor: Dr. Lo     Reason for call: Patient calling for sooner procedure per  Dr Lo does not have anything available      Call back#: [phone]

## 2025-05-29 ENCOUNTER — PROCEDURE VISIT (OUTPATIENT)
Dept: PAIN MEDICINE | Facility: CLINIC | Age: 60
End: 2025-05-29
Payer: COMMERCIAL

## 2025-05-29 VITALS
WEIGHT: 176 LBS | BODY MASS INDEX: 27.62 KG/M2 | HEIGHT: 67 IN | DIASTOLIC BLOOD PRESSURE: 98 MMHG | HEART RATE: 88 BPM | SYSTOLIC BLOOD PRESSURE: 159 MMHG

## 2025-05-29 DIAGNOSIS — M25.811 IMPINGEMENT OF RIGHT SHOULDER: ICD-10-CM

## 2025-05-29 DIAGNOSIS — M25.511 CHRONIC RIGHT SHOULDER PAIN: Primary | ICD-10-CM

## 2025-05-29 DIAGNOSIS — G89.29 CHRONIC RIGHT SHOULDER PAIN: Primary | ICD-10-CM

## 2025-05-29 PROCEDURE — 20611 DRAIN/INJ JOINT/BURSA W/US: CPT | Performed by: PHYSICAL MEDICINE & REHABILITATION

## 2025-05-29 RX ORDER — METHYLPREDNISOLONE ACETATE 40 MG/ML
40 INJECTION, SUSPENSION INTRA-ARTICULAR; INTRALESIONAL; INTRAMUSCULAR; SOFT TISSUE ONCE
Status: COMPLETED | OUTPATIENT
Start: 2025-05-29 | End: 2025-05-29

## 2025-05-29 RX ORDER — ROPIVACAINE HYDROCHLORIDE 2 MG/ML
2 INJECTION, SOLUTION EPIDURAL; INFILTRATION; PERINEURAL ONCE
Status: COMPLETED | OUTPATIENT
Start: 2025-05-29 | End: 2025-05-29

## 2025-05-29 RX ORDER — CYCLOBENZAPRINE HCL 10 MG
10 TABLET ORAL 3 TIMES DAILY
COMMUNITY

## 2025-05-29 RX ADMIN — ROPIVACAINE HYDROCHLORIDE 2 ML: 2 INJECTION, SOLUTION EPIDURAL; INFILTRATION; PERINEURAL at 15:20

## 2025-05-29 RX ADMIN — METHYLPREDNISOLONE ACETATE 40 MG: 40 INJECTION, SUSPENSION INTRA-ARTICULAR; INTRALESIONAL; INTRAMUSCULAR; SOFT TISSUE at 15:20

## 2025-05-29 NOTE — PROGRESS NOTES
Indication: Shoulder pain  Preprocedure diagnosis: 1. Bicipital tendonitis  2. Shoulder pain  Postprocedure diagnosis: 1. Bicipital tendonitis  2. Shoulder pain  Procedure: Ultrasound-guided right biceps tendon sheath injection  After discussing the risks, benefits, and alternatives to the procedure, the patient expressed understanding and wished to proceed. The patient was brought to the procedure suite and placed in the supine position. A procedural pause was conducted to verify: correct patient identity, procedure to be performed and as applicable, correct side and site, correct patient position, and availability of implants, special equipment or special requirements. A simple surgical tray was used. Prior to the procedure, the shoulder was examined with a 12-MHz linear transducer to visualize the long head biceps tendon and determine the optimal needle path. Following this, the shoulder was prepared with a ChloraPrep scrub, then re-examined using the same transducer, a sterile ultrasound transducer cover, and sterile ultrasound transducer gel. Thereafter, using continuous ultrasound guidance, a 2.5 inch 25-gauge needle was advanced into the long head biceps tendon sheath. After visualization of the tip in the target area and negative aspiration for blood, a mixture 40 mg Depo-Medrol in 2 mL of 0.2% ropivacaine was injected into the joint. Following the injection the needle was withdrawn. The patient tolerated the procedure well and there were no apparent complications. After an appropriate amount of observation, the patient was dismissed from the clinic in good condition under their own power.
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
## 2025-06-02 ENCOUNTER — EVALUATION (OUTPATIENT)
Dept: PHYSICAL THERAPY | Facility: CLINIC | Age: 60
End: 2025-06-02
Payer: COMMERCIAL

## 2025-06-02 DIAGNOSIS — G89.29 CHRONIC BILATERAL LOW BACK PAIN WITH RIGHT-SIDED SCIATICA: Primary | ICD-10-CM

## 2025-06-02 DIAGNOSIS — M25.511 CHRONIC RIGHT SHOULDER PAIN: ICD-10-CM

## 2025-06-02 DIAGNOSIS — M25.551 RIGHT HIP PAIN: ICD-10-CM

## 2025-06-02 DIAGNOSIS — M54.2 CERVICAL SPINE PAIN: ICD-10-CM

## 2025-06-02 DIAGNOSIS — M54.12 CERVICAL RADICULOPATHY: ICD-10-CM

## 2025-06-02 DIAGNOSIS — M54.41 CHRONIC BILATERAL LOW BACK PAIN WITH RIGHT-SIDED SCIATICA: Primary | ICD-10-CM

## 2025-06-02 DIAGNOSIS — M79.604 RIGHT LEG PAIN: ICD-10-CM

## 2025-06-02 DIAGNOSIS — G89.29 CHRONIC RIGHT SHOULDER PAIN: ICD-10-CM

## 2025-06-02 PROCEDURE — 97162 PT EVAL MOD COMPLEX 30 MIN: CPT

## 2025-06-02 PROCEDURE — 97112 NEUROMUSCULAR REEDUCATION: CPT

## 2025-06-02 PROCEDURE — 97110 THERAPEUTIC EXERCISES: CPT

## 2025-06-02 NOTE — HOME EXERCISE EDUCATION
Program_ID:[id number]   Access Code: [email or web address]  URL: [email or web address]  Date: 06-  Prepared By: Johnathan Singer    Program Notes      Exercises      - Seated Cervical Retraction - 5-8 x daily -  x weekly -  sets - 10 reps      - Standing Lumbar Extension - 5-8 x daily -  x weekly -  sets - 10 reps       Hematuira , h/o hydronephrosis, cystic pelvic mass. CHF Colovesical fistula diverticulosis

## 2025-06-06 ENCOUNTER — OFFICE VISIT (OUTPATIENT)
Dept: PHYSICAL THERAPY | Facility: CLINIC | Age: 60
End: 2025-06-06
Payer: COMMERCIAL

## 2025-06-06 DIAGNOSIS — M79.604 RIGHT LEG PAIN: ICD-10-CM

## 2025-06-06 DIAGNOSIS — G89.29 CHRONIC RIGHT SHOULDER PAIN: ICD-10-CM

## 2025-06-06 DIAGNOSIS — M25.511 CHRONIC RIGHT SHOULDER PAIN: ICD-10-CM

## 2025-06-06 DIAGNOSIS — M54.2 CERVICAL SPINE PAIN: ICD-10-CM

## 2025-06-06 DIAGNOSIS — M54.41 CHRONIC BILATERAL LOW BACK PAIN WITH RIGHT-SIDED SCIATICA: Primary | ICD-10-CM

## 2025-06-06 DIAGNOSIS — G89.29 CHRONIC BILATERAL LOW BACK PAIN WITH RIGHT-SIDED SCIATICA: Primary | ICD-10-CM

## 2025-06-06 DIAGNOSIS — M54.12 CERVICAL RADICULOPATHY: ICD-10-CM

## 2025-06-06 DIAGNOSIS — M25.551 RIGHT HIP PAIN: ICD-10-CM

## 2025-06-06 PROCEDURE — 97110 THERAPEUTIC EXERCISES: CPT

## 2025-06-06 NOTE — PROGRESS NOTES
Daily Note     Today's date: 2025  Patient name: Asaf Moise  : 1965  MRN: [id number]  Referring provider: Navi Lo DO  Dx:   Encounter Diagnosis     ICD-10-CM    1. Chronic bilateral low back pain with right-sided sciatica  G89.29     M54.41       2. Right hip pain  M25.551       3. Right leg pain  M79.604       4. Cervical spine pain  M54.2       5. Chronic right shoulder pain  M25.511     G89.29       6. Cervical radiculopathy  M54.12           Start Time: 1503  Stop Time: 1537  Total time in clinic (min): 34 minutes    Subjective: Pt reports that he has greater flexibility in his back and arm now, but pain levels overall have not changed significantly. He has been performing KIM/REIL and cervical retraction more than five times per day.      Objective: See treatment diary below      Assessment: No significant change in pain with REIL and KIM, however there was an increase in R hip IR AROM. There was a greater increase in AROM and decrease in pain following repeated thoracic spine extension with clinician OP; encouraged performance of this seated in a chair for HEP. There was also a great improvement in R shoulder elevation AROM with decreased pain following repeated thoracic extension. Plan to continue with extension based exercises for the spine. All treatments tolerated well, no adverse response.      Plan: Continue per plan of care.      Comparable signs:  1) Lumbar spine AROM  2) R hip AROM  3) R hip and knee MMT  4) Cervical spine AROM  5) RUE AROM  6) RUE MMT  7) lifting  8) holding drill    Insurance ReEval POC expires Auth Status Total   Visits  Start date  Expiration date PT/OT + Visit Limit? Co-Insurance Misc   Progressive Auto 25 Not required N/a N/a N/a N/a No                                                        Visit/Unit Tracking  AUTH Status: not required Date 25              Visits  Authed: not req Used 1 2               Remaining                      Precautions: HTN, T2DM    Manuals 6/2 6/6         Lumbar mob  L1-L3 grade III         Thoracic mob  T9-T12 grade III                               Neuro Re-Ed                                                                             Edu Upper and lower quarter neuroanatomy, pathophysiology, postural education          Ther Ex           KIM 2x10 2x10 w/ belt OP         REIL x10 2x10         Repeated cervical spine retraction 2x10 2x10         LTR  X10 ea         Seated thoracic extension  X10 w/ clinician OP                               Edu Examination findings, POC, HEP HEP         Ther Activity                                 Gait Training                                 Modalities

## 2025-06-09 ENCOUNTER — OFFICE VISIT (OUTPATIENT)
Dept: PHYSICAL THERAPY | Facility: CLINIC | Age: 60
End: 2025-06-09
Payer: COMMERCIAL

## 2025-06-09 DIAGNOSIS — M54.2 CERVICAL SPINE PAIN: ICD-10-CM

## 2025-06-09 DIAGNOSIS — M54.12 CERVICAL RADICULOPATHY: ICD-10-CM

## 2025-06-09 DIAGNOSIS — G89.29 CHRONIC BILATERAL LOW BACK PAIN WITH RIGHT-SIDED SCIATICA: Primary | ICD-10-CM

## 2025-06-09 DIAGNOSIS — M25.511 CHRONIC RIGHT SHOULDER PAIN: ICD-10-CM

## 2025-06-09 DIAGNOSIS — M25.551 RIGHT HIP PAIN: ICD-10-CM

## 2025-06-09 DIAGNOSIS — M79.604 RIGHT LEG PAIN: ICD-10-CM

## 2025-06-09 DIAGNOSIS — G89.29 CHRONIC RIGHT SHOULDER PAIN: ICD-10-CM

## 2025-06-09 DIAGNOSIS — M54.41 CHRONIC BILATERAL LOW BACK PAIN WITH RIGHT-SIDED SCIATICA: Primary | ICD-10-CM

## 2025-06-09 PROCEDURE — 97110 THERAPEUTIC EXERCISES: CPT
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
## 2025-06-09 NOTE — PROGRESS NOTES
Daily Note     Today's date: 2025  Patient name: Asaf Moise  : 1965  MRN: [id number]  Referring provider: Navi Lo DO  Dx:   Encounter Diagnosis     ICD-10-CM    1. Chronic bilateral low back pain with right-sided sciatica  G89.29     M54.41       2. Right hip pain  M25.551       3. Right leg pain  M79.604       4. Cervical spine pain  M54.2       5. Chronic right shoulder pain  M25.511     G89.29       6. Cervical radiculopathy  M54.12             Start Time: 1533  Stop Time: 1557  Total time in clinic (min): 24 minutes    Subjective: Pt reports that he his right hip feels good because he did not work over the weekend; feels looser with exercise. Reports that his shoulder pain and flexibility are improving, especially since addition of thoracic extension exercise.      Objective: See treatment diary below      Assessment: Positive response in R shoulder to repeated cervical spine retraction+extension and repeated thoracic spine extension. Added repeated R shoulder IR to improve ability to reach behind back; this improved functional IR reach so this was added to HEP. All treatments tolerated well, no adverse response.      Plan: Continue per plan of care.      Comparable signs:  1) Lumbar spine AROM  2) R hip AROM  3) R hip and knee MMT  4) Cervical spine AROM  5) RUE AROM  6) RUE MMT  7) lifting  8) holding drill    Insurance ReEval POC expires Auth Status Total   Visits  Start date  Expiration date PT/OT + Visit Limit? Co-Insurance Misc   Progressive Auto 25 Not required N/a N/a N/a N/a No                                                        Visit/Unit Tracking  AUTH Status: not required Date 25             Visits  Authed: not req Used 1 2 3              Remaining                     Precautions: HTN, T2DM    Manuals         Lumbar mob  L1-L3 grade III         Thoracic mob  T9-T12 grade III                               Neuro Re-Ed                                                                              Edu Upper and lower quarter neuroanatomy, pathophysiology, postural education          Ther Ex           KIM 2x10 2x10 w/ belt OP edu        REIL x10 2x10 edu        Repeated cervical spine retraction 2x10 2x10         Repeated cervical spine retraction+extension   2x15        LTR  X10 ea         Seated thoracic extension  X10 w/ clinician OP X10 w/ clinician OP    2x15 supine foam roller                              Edu Examination findings, POC, HEP HEP HEP        Ther Activity                                 Gait Training                                 Modalities

## 2025-06-09 NOTE — HOME EXERCISE EDUCATION
Program_ID:[id number]   Access Code: [email or web address]  URL: [email or web address]  Date: 06-  Prepared By: Johnathan Singer    Program Notes      Exercises      - Standing Lumbar Extension - 5 x daily -  x weekly -  sets - 10-15 reps      - Prone Press Up -  x daily -  x weekly -  sets -  reps      - Seated Cervical Retraction and Extension -  x daily - 5 x weekly -  sets - 10-15 reps      - Standing Shoulder Internal Rotation Stretch with Towel -  x daily - 5 x weekly -  sets - 10-15 reps      - Seated Thpracic Extension - 5 x daily -  x weekly - 1-2 sets - 10 reps      - Thoracic Extension Mobilization on Foam Roll -  x daily -  x weekly -  sets -  reps

## 2025-06-12 ENCOUNTER — TELEPHONE (OUTPATIENT)
Dept: RADIOLOGY | Facility: MEDICAL CENTER | Age: 60
End: 2025-06-12

## 2025-06-12 ENCOUNTER — HOSPITAL ENCOUNTER (OUTPATIENT)
Dept: RADIOLOGY | Facility: CLINIC | Age: 60
Discharge: HOME | End: 2025-06-12
Attending: PHYSICAL MEDICINE & REHABILITATION
Payer: COMMERCIAL

## 2025-06-12 VITALS
RESPIRATION RATE: 20 BRPM | TEMPERATURE: 98 F | OXYGEN SATURATION: 99 % | HEART RATE: 83 BPM | SYSTOLIC BLOOD PRESSURE: 142 MMHG | DIASTOLIC BLOOD PRESSURE: 82 MMHG
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
## 2025-06-12 DIAGNOSIS — M54.9 MID BACK PAIN: ICD-10-CM

## 2025-06-12 DIAGNOSIS — M51.26 LUMBAR DISC HERNIATION: ICD-10-CM

## 2025-06-12 DIAGNOSIS — M79.18 MYOFASCIAL PAIN: ICD-10-CM

## 2025-06-12 DIAGNOSIS — M75.21 BICEPS TENDINITIS OF RIGHT UPPER EXTREMITY: ICD-10-CM

## 2025-06-12 PROCEDURE — 62323 NJX INTERLAMINAR LMBR/SAC: CPT | Performed by: PHYSICAL MEDICINE & REHABILITATION

## 2025-06-12 RX ORDER — METHYLPREDNISOLONE ACETATE 80 MG/ML
80 INJECTION, SUSPENSION INTRA-ARTICULAR; INTRALESIONAL; INTRAMUSCULAR; PARENTERAL; SOFT TISSUE ONCE
Status: COMPLETED | OUTPATIENT
Start: 2025-06-12 | End: 2025-06-12

## 2025-06-12 RX ADMIN — METHYLPREDNISOLONE ACETATE 80 MG: 80 INJECTION, SUSPENSION INTRA-ARTICULAR; INTRALESIONAL; INTRAMUSCULAR; SOFT TISSUE at 10:34

## 2025-06-12 RX ADMIN — IOHEXOL 1 ML: 300 INJECTION, SOLUTION INTRAVENOUS at 10:34
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
## 2025-06-12 NOTE — DISCHARGE INSTR - LAB
Epidural Steroid Injection   WHAT YOU NEED TO KNOW:   An epidural steroid injection (JES) is a procedure to inject steroid medicine into the epidural space. The epidural space is between your spinal cord and vertebrae. Steroids reduce inflammation and fluid buildup in your spine that may be causing pain. You may be given pain medicine along with the steroids.          ACTIVITY  Do not drive or operate machinery today.  No strenuous activity today - bending, lifting, etc.  You may resume normal activites starting tomorrow - start slowly and as tolerated.  You may shower today, but no tub baths or hot tubs.  You may have numbness for several hours from the local anesthetic. Please use caution and common sense, especially with weight-bearing activities.    CARE OF THE INJECTION SITE  If you have soreness or pain, apply ice to the area today (20 minutes on/20 minutes off).  Starting tomorrow, you may use warm, moist heat or ice if needed.  You may have an increase or change in your discomfort for 36-48 hours after your treatment.  Apply ice and continue with any pain medication you have been prescribed.  Notify the [facility] if you have any of the following: redness, drainage, swelling, headache, stiff neck or fever above 100°F.    SPECIAL INSTRUCTIONS  Our office will contact you in approximately 14 days for a progress report.    MEDICATIONS  Continue to take all routine medications.  Our office may have instructed you to hold some medications.    As no general anesthesia was used in today's procedure, you should not experience any side effects related to anesthesia.     If you are diabetic, the steroids used in today's injection may temporarily increase your blood sugar levels after the first few days after your injection. Please keep a close eye on your sugars and alert the doctor who manages your diabetes if your sugars are significantly high from your baseline or you are symptomatic.     If you have a  problem specifically related to your procedure, please call our office at [phone].  Problems not related to your procedure should be directed to your primary care physician.

## 2025-06-13 ENCOUNTER — APPOINTMENT (OUTPATIENT)
Dept: PHYSICAL THERAPY | Facility: CLINIC | Age: 60
End: 2025-06-13
Payer: COMMERCIAL

## 2025-06-23 ENCOUNTER — OFFICE VISIT (OUTPATIENT)
Dept: PHYSICAL THERAPY | Facility: CLINIC | Age: 60
End: 2025-06-23
Payer: COMMERCIAL

## 2025-06-23 DIAGNOSIS — G89.29 CHRONIC RIGHT SHOULDER PAIN: ICD-10-CM

## 2025-06-23 DIAGNOSIS — M25.551 RIGHT HIP PAIN: ICD-10-CM

## 2025-06-23 DIAGNOSIS — M79.604 RIGHT LEG PAIN: ICD-10-CM

## 2025-06-23 DIAGNOSIS — M54.2 CERVICAL SPINE PAIN: ICD-10-CM

## 2025-06-23 DIAGNOSIS — M54.12 CERVICAL RADICULOPATHY: ICD-10-CM

## 2025-06-23 DIAGNOSIS — M54.41 CHRONIC BILATERAL LOW BACK PAIN WITH RIGHT-SIDED SCIATICA: Primary | ICD-10-CM

## 2025-06-23 DIAGNOSIS — G89.29 CHRONIC BILATERAL LOW BACK PAIN WITH RIGHT-SIDED SCIATICA: Primary | ICD-10-CM

## 2025-06-23 DIAGNOSIS — M25.511 CHRONIC RIGHT SHOULDER PAIN: ICD-10-CM

## 2025-06-23 PROCEDURE — 97110 THERAPEUTIC EXERCISES: CPT

## 2025-06-23 NOTE — PROGRESS NOTES
Daily Note     Today's date: 2025  Patient name: Asaf Moise  : 1965  MRN: [id number]  Referring provider: Navi Lo DO  Dx:   Encounter Diagnosis     ICD-10-CM    1. Chronic bilateral low back pain with right-sided sciatica  G89.29     M54.41       2. Right hip pain  M25.551       3. Right leg pain  M79.604       4. Cervical spine pain  M54.2       5. Chronic right shoulder pain  M25.511     G89.29       6. Cervical radiculopathy  M54.12           Start Time: 935  Stop Time: 1000  Total time in clinic (min): 25 minutes    Subjective: Pt reports that his shoulder and hip continue to improve, but he still has shoulder pain with lateral elevation and hip pain with forward bending. He has been performing HEP consistently. Repeated shoulder IR has been helpful for reaching behind back.      Objective: See treatment diary below      Assessment: Positive response in R shoulder to repeated cervical spine retraction+extension and repeated thoracic spine extension once again; encouraged increase in frequency of performance of these for HEP. Further improvement in pain and range of motion in the R shoulder with addition of repeated shoulder extension; added to HEP. Pain in posterior R hip with forward bending decreased following repeated lumbar extension in standing and prone lying; encouraged increase in frequency of performance of these. All treatments tolerated well, no adverse response.      Plan: Continue per plan of care.      Comparable signs:  1) Lumbar spine AROM  2) R hip AROM  3) R hip and knee MMT  4) Cervical spine AROM  5) RUE AROM  6) RUE MMT  7) lifting  8) holding drill    Insurance ReEval POC expires Auth Status Total   Visits  Start date  Expiration date PT/OT + Visit Limit? Co-Insurance Misc   Progressive Auto 25 Not required N/a N/a N/a N/a No                                                        Visit/Unit Tracking  AUTH Status: not required Date 25  6/23/25              Visits  Authed: not req Used 1 2 3 4               Remaining                       Precautions: HTN, T2DM    Manuals 6/2 6/6 6/9 6/23     Lumbar mob  L1-L3 grade III       Thoracic mob  T9-T12 grade III                         Neuro Re-Ed                                                               Edu Upper and lower quarter neuroanatomy, pathophysiology, postural education        Ther Ex         KIM 2x10 2x10 w/ belt OP edu      REIL x10 2x10 edu 2x10     Repeated cervical spine retraction 2x10 2x10       Repeated cervical spine retraction+extension   2x15 X15  X10 w/ clinician OP (BETTER)     LTR  X10 ea       Seated thoracic extension  X10 w/ clinician OP X10 w/ clinician OP    2x15 supine foam roller Seated   X10 w/ clinician OP    2x15 foam roller     Repeated shoulder extension    2x10 R              Edu Examination findings, POC, HEP HEP HEP HEP     Ther Activity                           Gait Training                           Modalities

## 2025-06-26 ENCOUNTER — TELEPHONE (OUTPATIENT)
Dept: RADIOLOGY | Facility: MEDICAL CENTER | Age: 60
End: 2025-06-26

## 2025-06-30 NOTE — TELEPHONE ENCOUNTER
Patient reports 50-75% improvement post inj, still having some pain in shoulder, feels the same as before the injection.  Pain level 4/10, during activity it can go up to a 7-9/10    Pt would like to know when can he obtain another injection for shoulder.

## 2025-07-07 ENCOUNTER — TELEPHONE (OUTPATIENT)
Age: 60
End: 2025-07-07

## 2025-07-07 NOTE — TELEPHONE ENCOUNTER
Caller: Asaf     Doctor: Dr. Lo     Reason for call: Patient called to confirm appt for tomorrow     Call back#: [phone]   Pt c/o R sided abdominal pain x2 months. Has had some nausea with no vomiting, pt states that pain worsened today and feels like \"twisting\". Denies diarrhea, constipation, pt states he has also had loss of appetite. Pt also states he has lost 10 lbs in one month. Pt states he is concerned for pancreatitis. States he has hx of \"drinking problem\"

## 2025-07-08 ENCOUNTER — OFFICE VISIT (OUTPATIENT)
Dept: PAIN MEDICINE | Facility: CLINIC | Age: 60
End: 2025-07-08
Payer: COMMERCIAL

## 2025-07-08 DIAGNOSIS — M19.011 PRIMARY OSTEOARTHRITIS OF RIGHT SHOULDER: ICD-10-CM

## 2025-07-08 DIAGNOSIS — M75.21 BICIPITAL TENDONITIS OF SHOULDER, RIGHT: Primary | ICD-10-CM

## 2025-07-08 DIAGNOSIS — M51.26 LUMBAR DISC HERNIATION: ICD-10-CM

## 2025-07-08 PROCEDURE — 99214 OFFICE O/P EST MOD 30 MIN: CPT | Performed by: NURSE PRACTITIONER

## 2025-07-08 NOTE — ASSESSMENT & PLAN NOTE
Patient had injection on 5/29/2025 in which she reports he was feeling better, but recently was working with physical therapy and had an exacerbation of his pain.  He would like to try to repeat the injection and see if this helps before returning to orthopedics for evaluation.  Will schedule him for repeat right bicep tendon injection with Dr. Lo at the end of August.

## 2025-07-08 NOTE — PROGRESS NOTES
Name: Asaf Moise      : 1965      MRN: [id number]  Encounter Provider: HEIKE Merino  Encounter Date: 2025   Encounter department: [facility]  :  Assessment & Plan  Bicipital tendonitis of shoulder, right  Primary osteoarthritis of right shoulder  Patient had injection on 2025 in which she reports he was feeling better, but recently was working with physical therapy and had an exacerbation of his pain.  He would like to try to repeat the injection and see if this helps before returning to orthopedics for evaluation.  Will schedule him for repeat right bicep tendon injection with Dr. Lo at the end of August.         Lumbar disc herniation  Patient reports about 75% relief status post injection.  Can follow-up as needed for return of pain.           Complete risks and benefits including bleeding, infection, tissue reaction, nerve injury and allergic reaction were discussed. The approach was demonstrated using models and literature was provided. Verbal and written consent was obtained.    My impressions and treatment recommendations were discussed in detail with the patient who verbalized understanding and had no further questions.  Discharge instructions were provided. I personally saw and examined the patient and I agree with the above discussed plan of care.    History of Present Illness     Asaf Moise is a 60 y.o. male who presents to [facility] for interval re-evaluation in regards to pain in the Low back and right Shoulder. The patients last office visit was 2025. Patient presents today with pain in low back and right shoulder that does not radiate. Pain is described as Constant, Sharp, Throbbing, and Pins & Needles, to right shoulder. No pain to low back at present but does increase with activities. Symptoms are worse using right shoulder, back aggravated by bending, twisting motions. Alleviating factors identifiable by the  patient are rest. On the numeric pain scale of 1-10, the pain typically increases to max of 7 out of 10, which is currently impacting their quality of life.    Current pain meds include: None    Conservative therapy: PT; ongoing  Previous treatments: L4-L5 LESI  Date:6/12/2025;  75%% relief for 3 weeks and right bicep tendon injection 5/12/2025 50% better until aggravated at PT    Review of Systems   Constitutional: Negative.    Respiratory: Negative.     Cardiovascular: Negative.    Gastrointestinal: Negative.    Musculoskeletal:  Positive for arthralgias (right shoulder pain) and back pain.   Skin: Negative.    All other systems reviewed and are negative.      Medical History Reviewed by provider this encounter:  Tobacco  Allergies  Meds  Problems  Med Hx  Surg Hx  Fam Hx     .       Objective   There were no vitals taken for this visit.     Pain Score:   7  Physical Exam  Constitutional:normal, well developed, well nourished, alert, in no distress and non-toxic and no overt pain behavior.  Eyes:anicteric  HEENT:grossly intact  Neck:supple, symmetric, trachea midline and no masses   Pulmonary:even and unlabored  Cardiovascular:No edema or pitting edema present  Skin:Normal without rashes or lesions and well hydrated  Psychiatric:Mood and affect appropriate  Neurologic:Cranial Nerves II-XII grossly intact  Musculoskeletal: normal gait    Tenderness to right lateral shoulder, decreased active and passive range of motion.

## 2025-07-08 NOTE — ASSESSMENT & PLAN NOTE
Patient reports about 75% relief status post injection.  Can follow-up as needed for return of pain.

## 2025-07-14 ENCOUNTER — EVALUATION (OUTPATIENT)
Dept: PHYSICAL THERAPY | Facility: CLINIC | Age: 60
End: 2025-07-14
Payer: COMMERCIAL

## 2025-07-14 DIAGNOSIS — G89.29 CHRONIC BILATERAL LOW BACK PAIN WITH RIGHT-SIDED SCIATICA: Primary | ICD-10-CM

## 2025-07-14 DIAGNOSIS — M25.551 RIGHT HIP PAIN: ICD-10-CM

## 2025-07-14 DIAGNOSIS — M54.41 CHRONIC BILATERAL LOW BACK PAIN WITH RIGHT-SIDED SCIATICA: Primary | ICD-10-CM

## 2025-07-14 DIAGNOSIS — M79.604 RIGHT LEG PAIN: ICD-10-CM

## 2025-07-14 DIAGNOSIS — G89.29 CHRONIC RIGHT SHOULDER PAIN: ICD-10-CM

## 2025-07-14 DIAGNOSIS — M54.2 CERVICAL SPINE PAIN: ICD-10-CM

## 2025-07-14 DIAGNOSIS — M25.511 CHRONIC RIGHT SHOULDER PAIN: ICD-10-CM

## 2025-07-14 DIAGNOSIS — M54.12 CERVICAL RADICULOPATHY: ICD-10-CM

## 2025-07-14 PROCEDURE — 97110 THERAPEUTIC EXERCISES: CPT
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
## 2025-07-14 NOTE — HOME EXERCISE EDUCATION
Program_ID:[id number]   Access Code: [email or web address]  URL: [email or web address]  Date: 07-  Prepared By: Johnathan Singer    Program Notes      Exercises      - Seated Thpracic Extension - 5 x daily -  x weekly - 1-2 sets - 10 reps      - Seated Cervical Retraction and Extension -  x daily - 5 x weekly -  sets - 10-15 reps      - Standing Lumbar Extension - 5 x daily -  x weekly -  sets - 10-15 reps      - Prone Press Up -  x daily -  x weekly -  sets -  reps      - Thoracic Extension Mobilization on Foam Roll -  x daily -  x weekly -  sets -  reps

## 2025-07-21 ENCOUNTER — OFFICE VISIT (OUTPATIENT)
Dept: PHYSICAL THERAPY | Facility: CLINIC | Age: 60
End: 2025-07-21
Payer: COMMERCIAL

## 2025-07-21 DIAGNOSIS — M25.511 CHRONIC RIGHT SHOULDER PAIN: Primary | ICD-10-CM

## 2025-07-21 DIAGNOSIS — M25.551 RIGHT HIP PAIN: ICD-10-CM

## 2025-07-21 DIAGNOSIS — M54.41 CHRONIC BILATERAL LOW BACK PAIN WITH RIGHT-SIDED SCIATICA: ICD-10-CM

## 2025-07-21 DIAGNOSIS — M54.2 CERVICAL SPINE PAIN: ICD-10-CM

## 2025-07-21 DIAGNOSIS — M54.12 CERVICAL RADICULOPATHY: ICD-10-CM

## 2025-07-21 DIAGNOSIS — M79.604 RIGHT LEG PAIN: ICD-10-CM

## 2025-07-21 DIAGNOSIS — G89.29 CHRONIC RIGHT SHOULDER PAIN: Primary | ICD-10-CM

## 2025-07-21 DIAGNOSIS — G89.29 CHRONIC BILATERAL LOW BACK PAIN WITH RIGHT-SIDED SCIATICA: ICD-10-CM

## 2025-07-21 PROCEDURE — 97110 THERAPEUTIC EXERCISES: CPT

## 2025-07-21 NOTE — PROGRESS NOTES
"Daily Note     Today's date: 2025  Patient name: Asaf Moise  : 1965  MRN: [id number]  Referring provider: Navi Lo DO  Dx:   Encounter Diagnosis     ICD-10-CM    1. Chronic right shoulder pain  M25.511     G89.29       2. Cervical spine pain  M54.2       3. Cervical radiculopathy  M54.12       4. Chronic bilateral low back pain with right-sided sciatica  G89.29     M54.41       5. Right hip pain  M25.551       6. Right leg pain  M79.604           Start Time: 836  Stop Time: 902  Total time in clinic (min): 26 minutes    Subjective: Asaf reports that his R shoulder was feeling good up until yesterday, when he was driving and began to feel more pain. Now when he elevates his arm, it sometimes quickly falls downwards due to pain. Reports that it feels like something is rubbing over something else in the shoulder. Reports that his low back pain is not as severe.      Objective: See treatment diary below    Pain with R shoulder abduction and 90/90 ER    Assessment: Pain with R shoulder abudction and 90/90 ER AROM decreased following performance of repeated shoulder flexion exercises; he also reported that his \"arm feels lighter\" when moving. He reported burning, but less pain in the R shoulder; no significant change in burning after repeated cervical spine retraction. Encouraged performance of repeated R shoulder flexion for HEP.      Plan: Continue per plan of care.      Comparable signs:  1) Lumbar spine AROM  2) R hip AROM  3) R hip and knee MMT  4) Cervical spine AROM  5) RUE AROM  6) RUE MMT  7) lifting  8) holding drill    Insurance ReEval POC expires Auth Status Total   Visits  Start date  Expiration date PT/OT + Visit Limit? Co-Insurance Misc   Progressive Auto 25 Not required N/a N/a N/a N/a No                                                          Visit/Unit Tracking  AUTH Status: not required Date 25            Visits  " Authed: not req Used 1 2 3 4 5 6             Remaining                       Precautions: HTN, T2DM    Manuals 6/2 6/6 6/9 6/23 7/14 7/21   Lumbar mob  L1-L3 grade III       Thoracic mob  T9-T12 grade III                         Neuro Re-Ed                                                               Edu Upper and lower quarter neuroanatomy, pathophysiology, postural education        Ther Ex         KIM 2x10 2x10 w/ belt OP edu      REIL x10 2x10 edu 2x10     Repeated cervical spine retraction 2x10 2x10       Repeated cervical spine retraction+extension   2x15 X15  X10 w/ clinician OP (BETTER) 2x8 w/ clinician OP x8 w/ pt OP   LTR  X10 ea       Seated thoracic extension  X10 w/ clinician OP X10 w/ clinician OP    2x15 supine foam roller Seated   X10 w/ clinician OP    2x15 foam roller Seated 2x8 w/ clinician OP    Repeated shoulder extension    2x10 R STOP    Repeated shoulder flexion      RIGHT    Pulleys 2x25    On wall  2x15    AROM w/ clinician OP x6   Edu Examination findings, POC, HEP HEP HEP HEP POC, HEP HEP   Ther Activity                           Gait Training                           Modalities

## 2025-07-28 ENCOUNTER — OFFICE VISIT (OUTPATIENT)
Dept: PHYSICAL THERAPY | Facility: CLINIC | Age: 60
End: 2025-07-28
Payer: COMMERCIAL

## 2025-07-28 DIAGNOSIS — G89.29 CHRONIC BILATERAL LOW BACK PAIN WITH RIGHT-SIDED SCIATICA: ICD-10-CM

## 2025-07-28 DIAGNOSIS — M54.12 CERVICAL RADICULOPATHY: ICD-10-CM

## 2025-07-28 DIAGNOSIS — M25.511 CHRONIC RIGHT SHOULDER PAIN: Primary | ICD-10-CM

## 2025-07-28 DIAGNOSIS — M25.551 RIGHT HIP PAIN: ICD-10-CM

## 2025-07-28 DIAGNOSIS — M79.604 RIGHT LEG PAIN: ICD-10-CM

## 2025-07-28 DIAGNOSIS — G89.29 CHRONIC RIGHT SHOULDER PAIN: Primary | ICD-10-CM

## 2025-07-28 DIAGNOSIS — M54.2 CERVICAL SPINE PAIN: ICD-10-CM

## 2025-07-28 DIAGNOSIS — M54.41 CHRONIC BILATERAL LOW BACK PAIN WITH RIGHT-SIDED SCIATICA: ICD-10-CM

## 2025-07-28 PROCEDURE — 97112 NEUROMUSCULAR REEDUCATION: CPT

## 2025-07-28 PROCEDURE — 97110 THERAPEUTIC EXERCISES: CPT

## 2025-08-11 ENCOUNTER — EVALUATION (OUTPATIENT)
Dept: PHYSICAL THERAPY | Facility: CLINIC | Age: 60
End: 2025-08-11
Payer: COMMERCIAL